# Patient Record
Sex: FEMALE | Race: OTHER | HISPANIC OR LATINO
[De-identification: names, ages, dates, MRNs, and addresses within clinical notes are randomized per-mention and may not be internally consistent; named-entity substitution may affect disease eponyms.]

---

## 2018-06-20 ENCOUNTER — RESULT REVIEW (OUTPATIENT)
Age: 83
End: 2018-06-20

## 2018-08-25 ENCOUNTER — TRANSCRIPTION ENCOUNTER (OUTPATIENT)
Age: 83
End: 2018-08-25

## 2018-08-27 ENCOUNTER — APPOINTMENT (OUTPATIENT)
Dept: OPHTHALMOLOGY | Facility: CLINIC | Age: 83
End: 2018-08-27
Payer: MEDICARE

## 2018-08-27 PROCEDURE — 92004 COMPRE OPH EXAM NEW PT 1/>: CPT

## 2018-09-05 ENCOUNTER — APPOINTMENT (OUTPATIENT)
Dept: ORTHOPEDIC SURGERY | Facility: CLINIC | Age: 83
End: 2018-09-05
Payer: MEDICARE

## 2018-09-05 VITALS
HEART RATE: 70 BPM | BODY MASS INDEX: 22.18 KG/M2 | WEIGHT: 110 LBS | DIASTOLIC BLOOD PRESSURE: 59 MMHG | HEIGHT: 59 IN | SYSTOLIC BLOOD PRESSURE: 153 MMHG

## 2018-09-05 PROCEDURE — 99203 OFFICE O/P NEW LOW 30 MIN: CPT

## 2018-09-05 PROCEDURE — 72110 X-RAY EXAM L-2 SPINE 4/>VWS: CPT

## 2018-09-05 PROCEDURE — 73502 X-RAY EXAM HIP UNI 2-3 VIEWS: CPT

## 2018-09-10 ENCOUNTER — FORM ENCOUNTER (OUTPATIENT)
Age: 83
End: 2018-09-10

## 2018-09-11 ENCOUNTER — OUTPATIENT (OUTPATIENT)
Dept: OUTPATIENT SERVICES | Facility: HOSPITAL | Age: 83
LOS: 1 days | End: 2018-09-11
Payer: MEDICARE

## 2018-09-11 ENCOUNTER — APPOINTMENT (OUTPATIENT)
Dept: MRI IMAGING | Facility: CLINIC | Age: 83
End: 2018-09-11
Payer: MEDICARE

## 2018-09-11 DIAGNOSIS — M54.16 RADICULOPATHY, LUMBAR REGION: ICD-10-CM

## 2018-09-11 PROCEDURE — 72148 MRI LUMBAR SPINE W/O DYE: CPT

## 2018-09-11 PROCEDURE — 72148 MRI LUMBAR SPINE W/O DYE: CPT | Mod: 26

## 2018-09-12 ENCOUNTER — APPOINTMENT (OUTPATIENT)
Dept: OPHTHALMOLOGY | Facility: CLINIC | Age: 83
End: 2018-09-12
Payer: MEDICARE

## 2018-09-12 PROCEDURE — 92134 CPTRZ OPH DX IMG PST SGM RTA: CPT

## 2018-09-12 PROCEDURE — 92014 COMPRE OPH EXAM EST PT 1/>: CPT

## 2018-09-12 PROCEDURE — 92136 OPHTHALMIC BIOMETRY: CPT | Mod: RT

## 2018-09-24 ENCOUNTER — APPOINTMENT (OUTPATIENT)
Dept: ORTHOPEDIC SURGERY | Facility: CLINIC | Age: 83
End: 2018-09-24
Payer: MEDICARE

## 2018-09-24 VITALS — DIASTOLIC BLOOD PRESSURE: 71 MMHG | SYSTOLIC BLOOD PRESSURE: 143 MMHG | HEART RATE: 73 BPM

## 2018-09-24 PROCEDURE — 99213 OFFICE O/P EST LOW 20 MIN: CPT

## 2018-10-02 ENCOUNTER — APPOINTMENT (OUTPATIENT)
Dept: INTERNAL MEDICINE | Facility: CLINIC | Age: 83
End: 2018-10-02
Payer: MEDICARE

## 2018-10-02 ENCOUNTER — LABORATORY RESULT (OUTPATIENT)
Age: 83
End: 2018-10-02

## 2018-10-02 VITALS
HEIGHT: 59 IN | DIASTOLIC BLOOD PRESSURE: 70 MMHG | SYSTOLIC BLOOD PRESSURE: 120 MMHG | BODY MASS INDEX: 22.58 KG/M2 | WEIGHT: 112 LBS

## 2018-10-02 DIAGNOSIS — Z87.19 PERSONAL HISTORY OF OTHER DISEASES OF THE DIGESTIVE SYSTEM: ICD-10-CM

## 2018-10-02 DIAGNOSIS — Z86.19 PERSONAL HISTORY OF OTHER INFECTIOUS AND PARASITIC DISEASES: ICD-10-CM

## 2018-10-02 PROCEDURE — G0009: CPT

## 2018-10-02 PROCEDURE — 36415 COLL VENOUS BLD VENIPUNCTURE: CPT

## 2018-10-02 PROCEDURE — 99204 OFFICE O/P NEW MOD 45 MIN: CPT | Mod: 25

## 2018-10-02 PROCEDURE — 90670 PCV13 VACCINE IM: CPT

## 2018-10-02 NOTE — ASSESSMENT
[FreeTextEntry1] : Continue same blood pressure medication.  Follow blood pressure.\par Continue same cholesterol medication.  Fasting lipid.\par 25 OH vit. D\par Esophageal reflux and dyspepsia-endoscopy\par Epigastric and RUQ pain-chest x-ray and abdominal ultrasound (NW)\par prevnar 13

## 2018-10-03 LAB
25(OH)D3 SERPL-MCNC: 38.2 NG/ML
ALBUMIN SERPL ELPH-MCNC: 4.5 G/DL
ALP BLD-CCNC: 67 U/L
ALT SERPL-CCNC: 12 U/L
AMYLASE/CREAT SERPL: 205 U/L
ANION GAP SERPL CALC-SCNC: 12 MMOL/L
AST SERPL-CCNC: 22 U/L
BASOPHILS # BLD AUTO: 0.06 K/UL
BASOPHILS NFR BLD AUTO: 0.9 %
BILIRUB SERPL-MCNC: 0.4 MG/DL
BUN SERPL-MCNC: 22 MG/DL
CALCIUM SERPL-MCNC: 9.8 MG/DL
CHLORIDE SERPL-SCNC: 102 MMOL/L
CHOLEST SERPL-MCNC: 216 MG/DL
CHOLEST/HDLC SERPL: 4.5 RATIO
CK SERPL-CCNC: 157 U/L
CO2 SERPL-SCNC: 25 MMOL/L
CREAT SERPL-MCNC: 1.02 MG/DL
EOSINOPHIL # BLD AUTO: 0.2 K/UL
EOSINOPHIL NFR BLD AUTO: 2.9 %
GLUCOSE SERPL-MCNC: 89 MG/DL
HCT VFR BLD CALC: 42 %
HDLC SERPL-MCNC: 48 MG/DL
HGB BLD-MCNC: 13.6 G/DL
IMM GRANULOCYTES NFR BLD AUTO: 0.1 %
LDLC SERPL CALC-MCNC: 140 MG/DL
LPL SERPL-CCNC: 40 U/L
LYMPHOCYTES # BLD AUTO: 2.23 K/UL
LYMPHOCYTES NFR BLD AUTO: 32.5 %
MAN DIFF?: NORMAL
MCHC RBC-ENTMCNC: 28.9 PG
MCHC RBC-ENTMCNC: 32.4 GM/DL
MCV RBC AUTO: 89.4 FL
MONOCYTES # BLD AUTO: 0.49 K/UL
MONOCYTES NFR BLD AUTO: 7.1 %
NEUTROPHILS # BLD AUTO: 3.88 K/UL
NEUTROPHILS NFR BLD AUTO: 56.5 %
PLATELET # BLD AUTO: 234 K/UL
POTASSIUM SERPL-SCNC: 4.3 MMOL/L
PROT SERPL-MCNC: 7.5 G/DL
RBC # BLD: 4.7 M/UL
RBC # FLD: 13.3 %
SODIUM SERPL-SCNC: 139 MMOL/L
TRIGL SERPL-MCNC: 141 MG/DL
TSH SERPL-ACNC: 2.09 UIU/ML
WBC # FLD AUTO: 6.87 K/UL

## 2018-10-05 ENCOUNTER — APPOINTMENT (OUTPATIENT)
Dept: INTERNAL MEDICINE | Facility: CLINIC | Age: 83
End: 2018-10-05
Payer: MEDICARE

## 2018-10-05 ENCOUNTER — APPOINTMENT (OUTPATIENT)
Dept: GASTROENTEROLOGY | Facility: CLINIC | Age: 83
End: 2018-10-05

## 2018-10-05 VITALS
WEIGHT: 113 LBS | SYSTOLIC BLOOD PRESSURE: 122 MMHG | HEIGHT: 59 IN | TEMPERATURE: 97.9 F | BODY MASS INDEX: 22.78 KG/M2 | DIASTOLIC BLOOD PRESSURE: 64 MMHG

## 2018-10-05 PROCEDURE — 99214 OFFICE O/P EST MOD 30 MIN: CPT | Mod: 25

## 2018-10-05 PROCEDURE — 36415 COLL VENOUS BLD VENIPUNCTURE: CPT

## 2018-10-05 NOTE — ASSESSMENT
[FreeTextEntry1] : Cellulitis of left UE-Augmentin 875 b.i.d. X 7 days. If redness worsens or patient has fever, patient will call me

## 2018-10-05 NOTE — HISTORY OF PRESENT ILLNESS
[FreeTextEntry8] : After getting pneumonia shot on October 2, 2018, patient developed redness, swelling, and pain in proximal left arm. No fever.

## 2018-10-05 NOTE — PHYSICAL EXAM
[No Acute Distress] : no acute distress [Well Nourished] : well nourished [Well Developed] : well developed [Normal Outer Ear/Nose] : the outer ears and nose were normal in appearance [Supple] : supple [No Respiratory Distress] : no respiratory distress  [Normal Affect] : the affect was normal [Normal Mood] : the mood was normal [de-identified] : Erythema and edema in proximal left UE

## 2018-10-08 ENCOUNTER — APPOINTMENT (OUTPATIENT)
Dept: RADIOLOGY | Facility: CLINIC | Age: 83
End: 2018-10-08
Payer: MEDICARE

## 2018-10-08 ENCOUNTER — APPOINTMENT (OUTPATIENT)
Dept: ULTRASOUND IMAGING | Facility: CLINIC | Age: 83
End: 2018-10-08
Payer: MEDICARE

## 2018-10-08 ENCOUNTER — OUTPATIENT (OUTPATIENT)
Dept: OUTPATIENT SERVICES | Facility: HOSPITAL | Age: 83
LOS: 1 days | End: 2018-10-08
Payer: MEDICARE

## 2018-10-08 DIAGNOSIS — R05 COUGH: ICD-10-CM

## 2018-10-08 LAB
BASOPHILS # BLD AUTO: 0.03 K/UL
BASOPHILS NFR BLD AUTO: 0.3 %
EOSINOPHIL # BLD AUTO: 0.17 K/UL
EOSINOPHIL NFR BLD AUTO: 1.8 %
HCT VFR BLD CALC: 41.9 %
HGB BLD-MCNC: 13.7 G/DL
IMM GRANULOCYTES NFR BLD AUTO: 0.4 %
LYMPHOCYTES # BLD AUTO: 1.64 K/UL
LYMPHOCYTES NFR BLD AUTO: 17.3 %
MAN DIFF?: NORMAL
MCHC RBC-ENTMCNC: 29.2 PG
MCHC RBC-ENTMCNC: 32.7 GM/DL
MCV RBC AUTO: 89.3 FL
MONOCYTES # BLD AUTO: 0.64 K/UL
MONOCYTES NFR BLD AUTO: 6.8 %
NEUTROPHILS # BLD AUTO: 6.96 K/UL
NEUTROPHILS NFR BLD AUTO: 73.4 %
PLATELET # BLD AUTO: 227 K/UL
RBC # BLD: 4.69 M/UL
RBC # FLD: 13.5 %
WBC # FLD AUTO: 9.48 K/UL

## 2018-10-08 PROCEDURE — 76700 US EXAM ABDOM COMPLETE: CPT | Mod: 26

## 2018-10-08 PROCEDURE — 76700 US EXAM ABDOM COMPLETE: CPT

## 2018-10-08 PROCEDURE — 71046 X-RAY EXAM CHEST 2 VIEWS: CPT | Mod: 26

## 2018-10-08 PROCEDURE — 71046 X-RAY EXAM CHEST 2 VIEWS: CPT

## 2018-10-10 DIAGNOSIS — Z87.442 PERSONAL HISTORY OF URINARY CALCULI: ICD-10-CM

## 2018-10-12 ENCOUNTER — APPOINTMENT (OUTPATIENT)
Dept: OPHTHALMOLOGY | Facility: CLINIC | Age: 83
End: 2018-10-12
Payer: MEDICARE

## 2018-10-12 PROCEDURE — 92012 INTRM OPH EXAM EST PATIENT: CPT

## 2018-10-12 PROCEDURE — 92136 OPHTHALMIC BIOMETRY: CPT | Mod: LT

## 2018-10-16 ENCOUNTER — APPOINTMENT (OUTPATIENT)
Dept: INTERNAL MEDICINE | Facility: CLINIC | Age: 83
End: 2018-10-16
Payer: MEDICARE

## 2018-10-16 VITALS
SYSTOLIC BLOOD PRESSURE: 130 MMHG | HEART RATE: 68 BPM | HEIGHT: 59 IN | BODY MASS INDEX: 22.78 KG/M2 | WEIGHT: 113 LBS | DIASTOLIC BLOOD PRESSURE: 60 MMHG

## 2018-10-16 DIAGNOSIS — Z71.89 OTHER SPECIFIED COUNSELING: ICD-10-CM

## 2018-10-16 PROCEDURE — 93000 ELECTROCARDIOGRAM COMPLETE: CPT

## 2018-10-16 PROCEDURE — 94010 BREATHING CAPACITY TEST: CPT

## 2018-10-16 PROCEDURE — G0439: CPT

## 2018-10-16 PROCEDURE — 99214 OFFICE O/P EST MOD 30 MIN: CPT | Mod: 25

## 2018-10-16 NOTE — HEALTH RISK ASSESSMENT
[Fair] : ~his/her~ current health as fair  [Very Good] : ~his/her~  mood as very good [] : No [No falls in past year] : Patient reported no falls in the past year [0] : 2) Feeling down, depressed, or hopeless: Not at all (0) [Patient declined mammogram] : Patient declined mammogram [Patient declined PAP Smear] : Patient declined PAP Smear [Patient reported colonoscopy was normal] : Patient reported colonoscopy was normal [Change in mental status noted] : No change in mental status noted [Language] : denies difficulty with language [Behavior] : denies difficulty with behavior [Learning/Retaining New Information] : denies difficulty learning/retaining new information [Handling Complex Tasks] : denies difficulty handling complex tasks [Reasoning] : denies difficulty with reasoning [None] : None [Alone] : lives alone [Retired] : retired [] :  [Feels Safe at Home] : Feels safe at home [Fully functional (bathing, dressing, toileting, transferring, walking, feeding)] : Fully functional (bathing, dressing, toileting, transferring, walking, feeding) [Fully functional (using the telephone, shopping, preparing meals, housekeeping, doing laundry, using] : Fully functional and needs no help or supervision to perform IADLs (using the telephone, shopping, preparing meals, housekeeping, doing laundry, using transportation, managing medications and managing finances) [Smoke Detector] : smoke detector [Seat Belt] :  uses seat belt [Designated Healthcare Proxy] : Designated healthcare proxy [Name: ___] : Health Care Proxy's Name: [unfilled]  [Relationship: ___] : Relationship: [unfilled]

## 2018-10-16 NOTE — PHYSICAL EXAM
[Well-Appearing] : well-appearing [No Acute Distress] : no acute distress [Well Nourished] : well nourished [Well Developed] : well developed [Normal Outer Ear/Nose] : the outer ears and nose were normal in appearance [Supple] : supple [No Respiratory Distress] : no respiratory distress  [Clear to Auscultation] : lungs were clear to auscultation bilaterally [Normal Rate] : normal rate  [Regular Rhythm] : with a regular rhythm [No Edema] : there was no peripheral edema [Soft] : abdomen soft [Non Tender] : non-tender [Normal Posterior Cervical Nodes] : no posterior cervical lymphadenopathy [Normal Anterior Cervical Nodes] : no anterior cervical lymphadenopathy [No CVA Tenderness] : no CVA  tenderness [No Spinal Tenderness] : no spinal tenderness [Cyanosis] : no cyanosis [Abnormal Temperature] : normal temperature [Normal Gait] : normal gait [Normal Affect] : the affect was normal [Normal Mood] : the mood was normal

## 2018-10-16 NOTE — HISTORY OF PRESENT ILLNESS
[FreeTextEntry1] : Hypertension and hypercholesterol [de-identified] : Chronic fatigue\par 6 months of intermittent dyspepsia and esophageal reflux, especially after eating spicy or fatty food. No abdominal pain. Occasional dry cough. No melena.

## 2018-10-16 NOTE — HISTORY OF PRESENT ILLNESS
[FreeTextEntry1] : Hypertension and hypercholesterol [de-identified] : Chronic fatigue\par 6 months of intermittent dyspepsia and esophageal reflux, especially after eating spicy or fatty food. No abdominal pain. Occasional dry cough. No melena.

## 2018-10-16 NOTE — ASSESSMENT
[FreeTextEntry1] : Esophageal reflux and dyspepsia-endoscopy\par Continue same blood pressure medication.  Follow blood pressure.\par Continue same cholesterol medication.  Follow lipid.  Want cholesterol< 200\par Patient refuses all vaccinations, including Pneumovax, influenza, shingles, and Tdap.\par Patient refuses GYN evaluation and screening mammogram

## 2018-10-16 NOTE — DATA REVIEWED
[FreeTextEntry1] : EKG and PFT-results reviewed with patient.\par All blood tests were reviewed with patient.\par

## 2018-11-02 ENCOUNTER — OUTPATIENT (OUTPATIENT)
Dept: OUTPATIENT SERVICES | Facility: HOSPITAL | Age: 83
LOS: 1 days | End: 2018-11-02

## 2018-11-02 VITALS
SYSTOLIC BLOOD PRESSURE: 140 MMHG | HEIGHT: 59 IN | OXYGEN SATURATION: 97 % | TEMPERATURE: 98 F | WEIGHT: 111.99 LBS | DIASTOLIC BLOOD PRESSURE: 70 MMHG | HEART RATE: 68 BPM | RESPIRATION RATE: 16 BRPM

## 2018-11-02 DIAGNOSIS — Z98.890 OTHER SPECIFIED POSTPROCEDURAL STATES: Chronic | ICD-10-CM

## 2018-11-02 DIAGNOSIS — Z98.41 CATARACT EXTRACTION STATUS, RIGHT EYE: Chronic | ICD-10-CM

## 2018-11-02 DIAGNOSIS — S52.513A DISPLACED FRACTURE OF UNSPECIFIED RADIAL STYLOID PROCESS, INITIAL ENCOUNTER FOR CLOSED FRACTURE: ICD-10-CM

## 2018-11-02 DIAGNOSIS — Z90.710 ACQUIRED ABSENCE OF BOTH CERVIX AND UTERUS: Chronic | ICD-10-CM

## 2018-11-02 DIAGNOSIS — I10 ESSENTIAL (PRIMARY) HYPERTENSION: ICD-10-CM

## 2018-11-02 NOTE — H&P PST ADULT - PROBLEM SELECTOR PLAN 1
Pt is scheduled for operative fixation right distal radius fracture with exparel for 11/5/18. Pre-op instructions provided. Pepcid provided for GI prophylaxis. Chlorhexidine soap provided for skin prep. Pt stated understanding. Case discussed with Dr Blackburn anesthesiologist.

## 2018-11-02 NOTE — H&P PST ADULT - MUSCULOSKELETAL
details… detailed exam no joint warmth/no calf tenderness/normal strength/decreased ROM due to pain/no joint erythema

## 2018-11-02 NOTE — H&P PST ADULT - NEGATIVE ENMT SYMPTOMS
no tinnitus/no throat pain/no nose bleeds/no hearing difficulty/no vertigo/no sinus symptoms/no ear pain/no nasal congestion/no dysphagia

## 2018-11-02 NOTE — H&P PST ADULT - PMH
Displaced fracture of unspecified radial styloid process, initial encounter for closed fracture    GERD (gastroesophageal reflux disease)    Hepatitis C  treated 2 years ago  Hyperlipidemia    Hypertension    Lumbar radiculopathy    Macular degeneration    Osteoarthritis

## 2018-11-02 NOTE — H&P PST ADULT - RS GEN PE MLT RESP DETAILS PC
clear to auscultation bilaterally/respirations non-labored/good air movement/breath sounds equal/airway patent

## 2018-11-02 NOTE — H&P PST ADULT - NEGATIVE NEUROLOGICAL SYMPTOMS
no paresthesias/no syncope/no difficulty walking/no weakness/no generalized seizures/no tremors/no transient paralysis
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
## 2018-11-02 NOTE — H&P PST ADULT - MUSCULOSKELETAL COMMENTS
Right distal radius fracture Right forearm/wrist in splint, cap refill <3 sec, able to wiggle digits, positive sensation
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
## 2018-11-04 ENCOUNTER — TRANSCRIPTION ENCOUNTER (OUTPATIENT)
Age: 83
End: 2018-11-04

## 2018-11-05 ENCOUNTER — OUTPATIENT (OUTPATIENT)
Dept: OUTPATIENT SERVICES | Facility: HOSPITAL | Age: 83
LOS: 1 days | Discharge: HOME | End: 2018-11-05

## 2018-11-05 VITALS
TEMPERATURE: 98 F | RESPIRATION RATE: 16 BRPM | HEIGHT: 59 IN | OXYGEN SATURATION: 97 % | WEIGHT: 111.99 LBS | SYSTOLIC BLOOD PRESSURE: 157 MMHG | HEART RATE: 67 BPM | DIASTOLIC BLOOD PRESSURE: 53 MMHG

## 2018-11-05 VITALS
TEMPERATURE: 98 F | RESPIRATION RATE: 20 BRPM | DIASTOLIC BLOOD PRESSURE: 49 MMHG | SYSTOLIC BLOOD PRESSURE: 145 MMHG | HEART RATE: 85 BPM | OXYGEN SATURATION: 96 %

## 2018-11-05 DIAGNOSIS — Z98.890 OTHER SPECIFIED POSTPROCEDURAL STATES: Chronic | ICD-10-CM

## 2018-11-05 DIAGNOSIS — Z98.41 CATARACT EXTRACTION STATUS, RIGHT EYE: Chronic | ICD-10-CM

## 2018-11-05 DIAGNOSIS — Z90.710 ACQUIRED ABSENCE OF BOTH CERVIX AND UTERUS: Chronic | ICD-10-CM

## 2018-11-05 DIAGNOSIS — S52.513A DISPLACED FRACTURE OF UNSPECIFIED RADIAL STYLOID PROCESS, INITIAL ENCOUNTER FOR CLOSED FRACTURE: ICD-10-CM

## 2018-11-05 RX ORDER — ONDANSETRON 8 MG/1
1 TABLET, FILM COATED ORAL
Qty: 12 | Refills: 0
Start: 2018-11-05 | End: 2018-11-08

## 2018-11-05 NOTE — ASU DISCHARGE PLAN (ADULT/PEDIATRIC). - ACTIVITY LEVEL
no sports/gym/no weight bearing/no exercise no heavy lifting/no sports/gym/until cleared by Dr. Orozco. Use sling for support, Ice arm/no exercise/no weight bearing

## 2018-11-05 NOTE — ASU DISCHARGE PLAN (ADULT/PEDIATRIC). - ITEMS TO FOLLOWUP WITH YOUR PHYSICIAN'S
Please follow up with Dr. Orozco within 1-2 weeks. You may call [phone] to schedule an appointment.    Please do not participate in heavy lifting or strenuous exercise. Do not drive while taking pain medication.    Please go to the emergency department if you experience pain not controlled by pain medication, bleeding that will not stop, fevers or chills.

## 2018-11-05 NOTE — BRIEF OPERATIVE NOTE - PROCEDURE
<<-----Click on this checkbox to enter Procedure Open reduction and internal fixation of fracture of distal radius  11/05/2018    Active  RICHARD

## 2018-11-05 NOTE — ASU DISCHARGE PLAN (ADULT/PEDIATRIC). - NOTIFY
Fever greater than 101/Bleeding that does not stop Swelling that continues/Bleeding that does not stop/Persistent Nausea and Vomiting/Fever greater than 101/Numbness, color, or temperature change to extremity/Pain not relieved by Medications

## 2018-11-05 NOTE — ASU DISCHARGE PLAN (ADULT/PEDIATRIC). - FOLLOWUP APPOINTMENT CLINIC/PHYSICIAN
Please follow up with Dr. Orozco within 1-2 weeks. You may call [phone] to schedule an appointment.

## 2018-11-05 NOTE — ASU DISCHARGE PLAN (ADULT/PEDIATRIC). - INSTRUCTIONS
Please do not participate in heavy lifting or strenuous exercise. Do not drive while taking pain medication.    Please go to the emergency department if you experience pain not controlled by pain medication, bleeding that will not stop, fevers or chills. Progress to regular diet as tolerated.  Keep well hydrated.

## 2018-11-05 NOTE — ASU DISCHARGE PLAN (ADULT/PEDIATRIC). - ASU FOLLOWUP
911 or go to the nearest Emergency Room [facility] ([facility]):

## 2018-11-05 NOTE — ASU DISCHARGE PLAN (ADULT/PEDIATRIC). - MEDICATION SUMMARY - MEDICATIONS TO TAKE
I will START or STAY ON the medications listed below when I get home from the hospital:    oxyCODONE-acetaminophen 5 mg-325 mg oral tablet  -- 1 tab(s) by mouth every 4 to 6 hours MDD:10 per day  -- Caution federal law prohibits the transfer of this drug to any person other  than the person for whom it was prescribed.  May cause drowsiness.  Alcohol may intensify this effect.  Use care when operating dangerous machinery.  This prescription cannot be refilled.  This product contains acetaminophen.  Do not use  with any other product containing acetaminophen to prevent possible liver damage.  Using more of this medication than prescribed may cause serious breathing problems.    -- Indication: For Pain    losartan 25 mg oral tablet  -- 1 tab(s) by mouth once a day  -- Indication: For HTN    ondansetron 4 mg oral tablet  -- 1 tab(s) by mouth every 8 hours   -- Indication: For Nausea    pravastatin 20 mg oral tablet  -- 1 tab(s) by mouth once a day  -- Indication: For HLD    Linzess 145 mcg oral capsule  -- 1 cap(s) by mouth once a day, As Needed  -- Indication: For IBD

## 2018-11-09 ENCOUNTER — APPOINTMENT (OUTPATIENT)
Dept: UROLOGY | Facility: CLINIC | Age: 83
End: 2018-11-09

## 2018-11-21 ENCOUNTER — APPOINTMENT (OUTPATIENT)
Dept: SPINE | Facility: CLINIC | Age: 83
End: 2018-11-21
Payer: MEDICARE

## 2018-11-21 VITALS
WEIGHT: 113 LBS | DIASTOLIC BLOOD PRESSURE: 67 MMHG | SYSTOLIC BLOOD PRESSURE: 149 MMHG | HEART RATE: 77 BPM | BODY MASS INDEX: 22.78 KG/M2 | HEIGHT: 59 IN

## 2018-11-21 DIAGNOSIS — Z86.79 PERSONAL HISTORY OF OTHER DISEASES OF THE CIRCULATORY SYSTEM: ICD-10-CM

## 2018-11-21 PROBLEM — I10 ESSENTIAL (PRIMARY) HYPERTENSION: Chronic | Status: ACTIVE | Noted: 2018-11-02

## 2018-11-21 PROBLEM — K21.9 GASTRO-ESOPHAGEAL REFLUX DISEASE WITHOUT ESOPHAGITIS: Chronic | Status: ACTIVE | Noted: 2018-11-02

## 2018-11-21 PROBLEM — E78.5 HYPERLIPIDEMIA, UNSPECIFIED: Chronic | Status: ACTIVE | Noted: 2018-11-02

## 2018-11-21 PROBLEM — H35.30 UNSPECIFIED MACULAR DEGENERATION: Chronic | Status: ACTIVE | Noted: 2018-11-02

## 2018-11-21 PROBLEM — B19.20 UNSPECIFIED VIRAL HEPATITIS C WITHOUT HEPATIC COMA: Chronic | Status: ACTIVE | Noted: 2018-11-02

## 2018-11-21 PROBLEM — M54.16 RADICULOPATHY, LUMBAR REGION: Chronic | Status: ACTIVE | Noted: 2018-11-02

## 2018-11-21 PROBLEM — M19.90 UNSPECIFIED OSTEOARTHRITIS, UNSPECIFIED SITE: Chronic | Status: ACTIVE | Noted: 2018-11-02

## 2018-11-21 PROBLEM — S52.513A: Chronic | Status: ACTIVE | Noted: 2018-11-02

## 2018-11-21 PROCEDURE — 99204 OFFICE O/P NEW MOD 45 MIN: CPT

## 2018-11-21 RX ORDER — CHROMIUM 200 MCG
1000 TABLET ORAL DAILY
Qty: 90 | Refills: 3 | Status: COMPLETED | COMMUNITY
Start: 2018-10-02 | End: 2018-11-21

## 2018-11-21 RX ORDER — CARVEDILOL 3.12 MG/1
3.12 TABLET, FILM COATED ORAL
Refills: 7 | Status: COMPLETED | COMMUNITY
End: 2018-11-21

## 2018-11-21 RX ORDER — LOSARTAN POTASSIUM 25 MG/1
25 TABLET, FILM COATED ORAL DAILY
Qty: 90 | Refills: 3 | Status: COMPLETED | COMMUNITY
End: 2018-11-21

## 2018-11-30 NOTE — ASU PREOPERATIVE ASSESSMENT, ADULT (IPARK ONLY) - NOTHING BY MOUTH SINCE
How Severe Is Your Acne?: moderate Is This A New Presentation, Or A Follow-Up?: Acne 04-Nov-2018 19:00

## 2018-12-07 ENCOUNTER — APPOINTMENT (OUTPATIENT)
Dept: OPHTHALMOLOGY | Facility: CLINIC | Age: 83
End: 2018-12-07

## 2018-12-11 ENCOUNTER — APPOINTMENT (OUTPATIENT)
Dept: OPHTHALMOLOGY | Facility: CLINIC | Age: 83
End: 2018-12-11
Payer: MEDICARE

## 2018-12-11 PROCEDURE — 99213 OFFICE O/P EST LOW 20 MIN: CPT

## 2018-12-14 ENCOUNTER — APPOINTMENT (OUTPATIENT)
Dept: OPHTHALMOLOGY | Facility: CLINIC | Age: 83
End: 2018-12-14
Payer: MEDICARE

## 2018-12-14 PROCEDURE — 92012 INTRM OPH EXAM EST PATIENT: CPT

## 2018-12-19 ENCOUNTER — APPOINTMENT (OUTPATIENT)
Dept: OPHTHALMOLOGY | Facility: CLINIC | Age: 83
End: 2018-12-19
Payer: MEDICARE

## 2018-12-19 PROCEDURE — 92012 INTRM OPH EXAM EST PATIENT: CPT

## 2018-12-21 ENCOUNTER — RX RENEWAL (OUTPATIENT)
Age: 83
End: 2018-12-21

## 2018-12-28 ENCOUNTER — APPOINTMENT (OUTPATIENT)
Dept: INTERNAL MEDICINE | Facility: CLINIC | Age: 83
End: 2018-12-28
Payer: MEDICARE

## 2018-12-28 VITALS
BODY MASS INDEX: 22.98 KG/M2 | TEMPERATURE: 98.7 F | SYSTOLIC BLOOD PRESSURE: 110 MMHG | WEIGHT: 114 LBS | HEIGHT: 59 IN | DIASTOLIC BLOOD PRESSURE: 68 MMHG

## 2018-12-28 PROCEDURE — 99214 OFFICE O/P EST MOD 30 MIN: CPT

## 2018-12-28 NOTE — PHYSICAL EXAM
[No Acute Distress] : no acute distress [Well Nourished] : well nourished [Well Developed] : well developed [Normal Outer Ear/Nose] : the outer ears and nose were normal in appearance [Supple] : supple [No Respiratory Distress] : no respiratory distress  [Clear to Auscultation] : lungs were clear to auscultation bilaterally [Normal Rate] : normal rate  [Regular Rhythm] : with a regular rhythm [No Edema] : there was no peripheral edema [Soft] : abdomen soft [No CVA Tenderness] : no CVA  tenderness [No Spinal Tenderness] : no spinal tenderness [Cyanosis] : no cyanosis [Abnormal Temperature] : normal temperature [Normal Gait] : normal gait [Normal Affect] : the affect was normal [Normal Mood] : the mood was normal

## 2018-12-28 NOTE — HISTORY OF PRESENT ILLNESS
[FreeTextEntry8] : One week of itchy red facial rash. No SOB. No wheezing.\par 10 years of intermittent itchy rash throughout body-seen multiple dermatologists in the past with no improvement.

## 2019-01-03 ENCOUNTER — APPOINTMENT (OUTPATIENT)
Dept: OPHTHALMOLOGY | Facility: HOSPITAL | Age: 84
End: 2019-01-03

## 2019-01-04 ENCOUNTER — APPOINTMENT (OUTPATIENT)
Dept: OPHTHALMOLOGY | Facility: CLINIC | Age: 84
End: 2019-01-04

## 2019-01-09 ENCOUNTER — APPOINTMENT (OUTPATIENT)
Dept: SPINE | Facility: CLINIC | Age: 84
End: 2019-01-09

## 2019-01-11 ENCOUNTER — APPOINTMENT (OUTPATIENT)
Dept: OPHTHALMOLOGY | Facility: CLINIC | Age: 84
End: 2019-01-11

## 2019-01-17 ENCOUNTER — APPOINTMENT (OUTPATIENT)
Dept: INTERNAL MEDICINE | Facility: CLINIC | Age: 84
End: 2019-01-17
Payer: MEDICARE

## 2019-01-17 VITALS
TEMPERATURE: 98.6 F | BODY MASS INDEX: 22.98 KG/M2 | OXYGEN SATURATION: 96 % | HEART RATE: 70 BPM | HEIGHT: 59 IN | WEIGHT: 114 LBS | DIASTOLIC BLOOD PRESSURE: 60 MMHG | RESPIRATION RATE: 16 BRPM | SYSTOLIC BLOOD PRESSURE: 118 MMHG

## 2019-01-17 PROCEDURE — 94060 EVALUATION OF WHEEZING: CPT

## 2019-01-17 PROCEDURE — 99204 OFFICE O/P NEW MOD 45 MIN: CPT | Mod: 25

## 2019-01-17 NOTE — REASON FOR VISIT
[Initial Evaluation] : an initial evaluation [Cough] : cough [Wheezing] : wheezing [FreeTextEntry2] : Symptoms have been on and off throughout her lifetime, worse in recent years

## 2019-01-17 NOTE — ASSESSMENT
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 Long-term exposures to irritants other likely etiology. There may be an allergic component. Reflux may also be a contributor. She has sneezing in the morning with rhinitis. Vasomotor rhinitis may also be contributing. She also had exposure to secondhand smoke for many years.\par \par Flovent 220 will be started. The importance of using it religiously is stressed. She will be reevaluated in 6-8 weeks. The addition of montelukast or an anti-reflux regimen can be considered.

## 2019-01-17 NOTE — HISTORY OF PRESENT ILLNESS
[Stable] : are stable [None] : ~He/She~ has no significant interval events [Difficulty Breathing During Exertion] : stable dyspnea on exertion [Feelings Of Weakness On Exertion] : stable exercise intolerance [Cough] : stable coughing [Wheezing] : stable wheezing [Regional Soft Tissue Swelling Both Lower Extremities] : denies lower extremity edema [Chest Pain Or Discomfort] : denies chest pain [Fever] : denies fever [FreeTextEntry1] : She has had intervals of cough, usually dry. Catalysts include upper respiratory infections which would result in persisting cough and wheezing. She has used albuterol p.r.n. which has been helpful.\par \par She grew up in [place], lived briefly in [facility] and has been [place] ever since. 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 Her  was a heavy cigarette smoker. A chest x-ray in 10/18 revealed clear lung fields.

## 2019-01-17 NOTE — DISCUSSION/SUMMARY
[FreeTextEntry1] : Spirometry is normal but technically not an optimal study. Response to bronchodilator is equivocal.

## 2019-01-17 NOTE — REVIEW OF SYSTEMS
[Nasal Congestion] : nasal congestion [Postnasal Drip] : postnasal drip [As Noted in HPI] : as noted in HPI [Heartburn] : heartburn [Reflux] : reflux [Negative] : Gastrointestinal [Dry Eyes] : no dryness of the eyes [Eye Irritation] : no ~T irritation of the eyes [Ear Disturbance] : no ear disturbance [Epistaxis] : no nosebleeds [Sinus Problems] : no sinus problems [Sore Throat] : no sore throat [Dry Mouth] : no dry mouth [Mouth Ulcers] : no mouth ulcers [Indigestion] : no indigestion [Dysphagia] : no dysphagia [Nausea] : no nausea [Vomiting] : no vomiting [Constipation] : no constipation [Diarrhea] : no diarrhea [Food Intolerance] : no ~T food intolerance [Abdominal Pain] : no abdominal pain [Bleeding] : no bleeding [Hepatic Disease] : no hepatic disease

## 2019-01-28 ENCOUNTER — APPOINTMENT (OUTPATIENT)
Dept: INTERNAL MEDICINE | Facility: CLINIC | Age: 84
End: 2019-01-28

## 2019-03-14 ENCOUNTER — APPOINTMENT (OUTPATIENT)
Dept: INTERNAL MEDICINE | Facility: CLINIC | Age: 84
End: 2019-03-14
Payer: MEDICARE

## 2019-03-14 VITALS
BODY MASS INDEX: 23.79 KG/M2 | DIASTOLIC BLOOD PRESSURE: 54 MMHG | OXYGEN SATURATION: 97 % | SYSTOLIC BLOOD PRESSURE: 130 MMHG | TEMPERATURE: 97.8 F | HEIGHT: 59 IN | WEIGHT: 118 LBS | RESPIRATION RATE: 16 BRPM | HEART RATE: 68 BPM

## 2019-03-14 PROCEDURE — 99214 OFFICE O/P EST MOD 30 MIN: CPT | Mod: 25

## 2019-03-14 PROCEDURE — 94060 EVALUATION OF WHEEZING: CPT

## 2019-03-14 RX ORDER — FLUTICASONE PROPIONATE 220 UG/1
220 AEROSOL, METERED RESPIRATORY (INHALATION) TWICE DAILY
Qty: 1 | Refills: 3 | Status: DISCONTINUED | COMMUNITY
Start: 2019-01-17 | End: 2019-03-14

## 2019-03-14 NOTE — ASSESSMENT
[FreeTextEntry1] : The patient's exercise tolerance is severely limited due to wheezing. Spirometry is borderline low obstruction, with no significant response to bronchodilator. Her compliance to this point has been poor. A combination steroid/albuterol as prescribed. The importance of regular and Rastafari b.i.d. usage is stressed. She will be reevaluated in 4 weeks.

## 2019-03-14 NOTE — REASON FOR VISIT
[Asthma] : asthma [Cough] : cough [Shortness of Breath] : shortness of Breath [Follow-Up] : a follow-up visit

## 2019-03-14 NOTE — REVIEW OF SYSTEMS
[Nasal Congestion] : nasal congestion [Postnasal Drip] : postnasal drip [Hypertension] : ~T hypertension [As Noted in HPI] : as noted in HPI [Negative] : Gastrointestinal

## 2019-03-14 NOTE — HISTORY OF PRESENT ILLNESS
[Stable] : are stable [Difficulty Breathing During Exertion] : stable dyspnea on exertion [Feelings Of Weakness On Exertion] : stable exercise intolerance [Cough] : stable coughing [Wheezing] : stable wheezing [Regional Soft Tissue Swelling Both Lower Extremities] : denies lower extremity edema [Chest Pain Or Discomfort] : denies chest pain [FreeTextEntry1] : The patient's status is essentially unchanged. She still has ongoing coughing. Her exertional tolerance is very limited by the onset of wheezing. She wheezes a lot at night also.\par \par Flovent was prescribed but used by the patient between 2 and 3 times per week.

## 2019-03-14 NOTE — PHYSICAL EXAM
[General Appearance - Well Developed] : well developed [General Appearance - Well Nourished] : well nourished [Normal Conjunctiva] : the conjunctiva exhibited no abnormalities [Eyelids - No Xanthelasma] : the eyelids demonstrated no xanthelasmas [Normal Oropharynx] : normal oropharynx [Neck Appearance] : the appearance of the neck was normal [Neck Cervical Mass (___cm)] : no neck mass was observed [Jugular Venous Distention Increased] : there was no jugular-venous distention [Thyroid Diffuse Enlargement] : the thyroid was not enlarged [Thyroid Nodule] : there were no palpable thyroid nodules [Heart Rate And Rhythm] : heart rate and rhythm were normal [Heart Sounds] : normal S1 and S2 [Murmurs] : no murmurs present [Respiration, Rhythm And Depth] : normal respiratory rhythm and effort [Exaggerated Use Of Accessory Muscles For Inspiration] : no accessory muscle use [Auscultation Breath Sounds / Voice Sounds] : lungs were clear to auscultation bilaterally [Abdomen Soft] : soft [Abdomen Tenderness] : non-tender [Abdomen Mass (___ Cm)] : no abdominal mass palpated [Abnormal Walk] : normal gait [Gait - Sufficient For Exercise Testing] : the gait was sufficient for exercise testing [Nail Clubbing] : no clubbing of the fingernails [Cyanosis, Localized] : no localized cyanosis [Petechial Hemorrhages (___cm)] : no petechial hemorrhages [Skin Color & Pigmentation] : normal skin color and pigmentation [] : no rash [No Venous Stasis] : no venous stasis [Skin Lesions] : no skin lesions [No Skin Ulcers] : no skin ulcer [No Xanthoma] : no  xanthoma was observed [Oriented To Time, Place, And Person] : oriented to person, place, and time [Impaired Insight] : insight and judgment were intact [Affect] : the affect was normal [FreeTextEntry1] : Minimal end ion with cough, no rhonchi Or wheezes

## 2019-03-15 ENCOUNTER — RX RENEWAL (OUTPATIENT)
Age: 84
End: 2019-03-15

## 2019-04-16 ENCOUNTER — APPOINTMENT (OUTPATIENT)
Dept: INTERNAL MEDICINE | Facility: CLINIC | Age: 84
End: 2019-04-16
Payer: MEDICARE

## 2019-04-16 VITALS
SYSTOLIC BLOOD PRESSURE: 130 MMHG | HEIGHT: 59 IN | WEIGHT: 116 LBS | DIASTOLIC BLOOD PRESSURE: 60 MMHG | BODY MASS INDEX: 23.39 KG/M2

## 2019-04-16 PROCEDURE — 99214 OFFICE O/P EST MOD 30 MIN: CPT | Mod: 25

## 2019-04-16 PROCEDURE — 36415 COLL VENOUS BLD VENIPUNCTURE: CPT

## 2019-04-16 NOTE — PHYSICAL EXAM
[No Acute Distress] : no acute distress [Well Nourished] : well nourished [Well Developed] : well developed [Well-Appearing] : well-appearing [Supple] : supple [Normal Outer Ear/Nose] : the outer ears and nose were normal in appearance [No Respiratory Distress] : no respiratory distress  [Clear to Auscultation] : lungs were clear to auscultation bilaterally [Normal Rate] : normal rate  [No Edema] : there was no peripheral edema [Regular Rhythm] : with a regular rhythm [Soft] : abdomen soft [Non Tender] : non-tender [Normal Posterior Cervical Nodes] : no posterior cervical lymphadenopathy [Normal Anterior Cervical Nodes] : no anterior cervical lymphadenopathy [No CVA Tenderness] : no CVA  tenderness [No Spinal Tenderness] : no spinal tenderness [Speech Grossly Normal] : speech grossly normal [Normal Affect] : the affect was normal [Normal Mood] : the mood was normal [Cyanosis] : no cyanosis [Abnormal Temperature] : normal temperature

## 2019-04-16 NOTE — ASSESSMENT
[FreeTextEntry1] : Continue same blood pressure medication.  Follow blood pressure.\par Continue same cholesterol medication.  Fasting lipid.\par Asthma-stable.  per pulmonologist\par Urinary frequency- (NW)\par DEXA\par shingrix handout given to patient and all questions about shingrix were answered.\par

## 2019-04-16 NOTE — HISTORY OF PRESENT ILLNESS
[FreeTextEntry1] : Asthma, high glucose, hypertension, and hypercholesterolemia [de-identified] : Urinary frequency. No abdominal pain. No dysuria. No hematuria.\par

## 2019-04-18 LAB
ALBUMIN SERPL ELPH-MCNC: 4.2 G/DL
ALP BLD-CCNC: 74 U/L
ALT SERPL-CCNC: 10 U/L
ANION GAP SERPL CALC-SCNC: 12 MMOL/L
AST SERPL-CCNC: 19 U/L
BILIRUB SERPL-MCNC: 0.3 MG/DL
BUN SERPL-MCNC: 15 MG/DL
CALCIUM SERPL-MCNC: 9.3 MG/DL
CHLORIDE SERPL-SCNC: 105 MMOL/L
CHOLEST SERPL-MCNC: 145 MG/DL
CHOLEST/HDLC SERPL: 3.2 RATIO
CK SERPL-CCNC: 60 U/L
CO2 SERPL-SCNC: 22 MMOL/L
CREAT SERPL-MCNC: 0.9 MG/DL
CRP SERPL-MCNC: 0.2 MG/DL
ESTIMATED AVERAGE GLUCOSE: 120 MG/DL
GLUCOSE SERPL-MCNC: 94 MG/DL
HBA1C MFR BLD HPLC: 5.8 %
HDLC SERPL-MCNC: 46 MG/DL
LDLC SERPL CALC-MCNC: 70 MG/DL
POTASSIUM SERPL-SCNC: 4.2 MMOL/L
PROT SERPL-MCNC: 7 G/DL
SODIUM SERPL-SCNC: 139 MMOL/L
TRIGL SERPL-MCNC: 145 MG/DL

## 2019-05-17 ENCOUNTER — APPOINTMENT (OUTPATIENT)
Dept: INTERNAL MEDICINE | Facility: CLINIC | Age: 84
End: 2019-05-17
Payer: MEDICARE

## 2019-05-17 VITALS
OXYGEN SATURATION: 98 % | SYSTOLIC BLOOD PRESSURE: 130 MMHG | HEIGHT: 59 IN | DIASTOLIC BLOOD PRESSURE: 58 MMHG | HEART RATE: 90 BPM | TEMPERATURE: 98.8 F | RESPIRATION RATE: 16 BRPM | WEIGHT: 116 LBS | BODY MASS INDEX: 23.39 KG/M2

## 2019-05-17 PROCEDURE — 99213 OFFICE O/P EST LOW 20 MIN: CPT | Mod: 25

## 2019-05-17 PROCEDURE — 94010 BREATHING CAPACITY TEST: CPT

## 2019-05-17 NOTE — ASSESSMENT
[FreeTextEntry1] : Doing well despite using only a beta agonist. Spirometry is normal. Pulse oximetry is 98%. She will have better exertional tolerance if she could be on a steroid inhaler. She will speak with her insurance carrier as to what options are available. Recheck in 3 months or p.r.n.\par \par Next visit spirometry pre-and postbronchodilator.

## 2019-05-17 NOTE — HISTORY OF PRESENT ILLNESS
[Stable] : are stable [Difficulty Breathing During Exertion] : stable dyspnea on exertion [Feelings Of Weakness On Exertion] : stable exercise intolerance [Cough] : improved coughing [Wheezing] : improved wheezing [Regional Soft Tissue Swelling Both Lower Extremities] : denies lower extremity edema [Chest Pain Or Discomfort] : denies chest pain [Fever] : denies fever [FreeTextEntry9] : Using Pro air  only several times per day. She was unable to obtain Dulera\par at the pharmacy.== Due to insurance issues apparently.

## 2019-05-17 NOTE — PHYSICAL EXAM
[General Appearance - Well Developed] : well developed [General Appearance - Well Nourished] : well nourished [Normal Conjunctiva] : the conjunctiva exhibited no abnormalities [Eyelids - No Xanthelasma] : the eyelids demonstrated no xanthelasmas [Normal Oropharynx] : normal oropharynx [Neck Cervical Mass (___cm)] : no neck mass was observed [Neck Appearance] : the appearance of the neck was normal [Jugular Venous Distention Increased] : there was no jugular-venous distention [Thyroid Diffuse Enlargement] : the thyroid was not enlarged [Thyroid Nodule] : there were no palpable thyroid nodules [Heart Rate And Rhythm] : heart rate and rhythm were normal [Heart Sounds] : normal S1 and S2 [Murmurs] : no murmurs present [Respiration, Rhythm And Depth] : normal respiratory rhythm and effort [Exaggerated Use Of Accessory Muscles For Inspiration] : no accessory muscle use [Auscultation Breath Sounds / Voice Sounds] : lungs were clear to auscultation bilaterally [Abdomen Soft] : soft [Abdomen Tenderness] : non-tender [Abdomen Mass (___ Cm)] : no abdominal mass palpated [Abnormal Walk] : normal gait [Gait - Sufficient For Exercise Testing] : the gait was sufficient for exercise testing [Nail Clubbing] : no clubbing of the fingernails [Cyanosis, Localized] : no localized cyanosis [Petechial Hemorrhages (___cm)] : no petechial hemorrhages [Skin Color & Pigmentation] : normal skin color and pigmentation [] : no rash [No Venous Stasis] : no venous stasis [Skin Lesions] : no skin lesions [No Skin Ulcers] : no skin ulcer [No Xanthoma] : no  xanthoma was observed [Oriented To Time, Place, And Person] : oriented to person, place, and time [Impaired Insight] : insight and judgment were intact [Affect] : the affect was normal [FreeTextEntry1] : Minimal end ion with cough, no rhonchi Or wheezes

## 2019-05-23 RX ORDER — MOMETASONE FUROATE AND FORMOTEROL FUMARATE DIHYDRATE 200; 5 UG/1; UG/1
200-5 AEROSOL RESPIRATORY (INHALATION) TWICE DAILY
Qty: 1 | Refills: 6 | Status: DISCONTINUED | COMMUNITY
Start: 2019-03-14 | End: 2019-05-23

## 2019-06-02 ENCOUNTER — FORM ENCOUNTER (OUTPATIENT)
Age: 84
End: 2019-06-02

## 2019-06-03 ENCOUNTER — APPOINTMENT (OUTPATIENT)
Dept: RADIOLOGY | Facility: CLINIC | Age: 84
End: 2019-06-03
Payer: MEDICARE

## 2019-06-03 ENCOUNTER — APPOINTMENT (OUTPATIENT)
Dept: INTERNAL MEDICINE | Facility: CLINIC | Age: 84
End: 2019-06-03
Payer: MEDICARE

## 2019-06-03 ENCOUNTER — OUTPATIENT (OUTPATIENT)
Dept: OUTPATIENT SERVICES | Facility: HOSPITAL | Age: 84
LOS: 1 days | End: 2019-06-03
Payer: MEDICARE

## 2019-06-03 VITALS
TEMPERATURE: 98.8 F | SYSTOLIC BLOOD PRESSURE: 140 MMHG | DIASTOLIC BLOOD PRESSURE: 70 MMHG | OXYGEN SATURATION: 97 % | HEIGHT: 59 IN | WEIGHT: 113 LBS | BODY MASS INDEX: 22.78 KG/M2 | HEART RATE: 80 BPM

## 2019-06-03 DIAGNOSIS — Z98.890 OTHER SPECIFIED POSTPROCEDURAL STATES: Chronic | ICD-10-CM

## 2019-06-03 DIAGNOSIS — Z98.41 CATARACT EXTRACTION STATUS, RIGHT EYE: Chronic | ICD-10-CM

## 2019-06-03 DIAGNOSIS — R05 COUGH: ICD-10-CM

## 2019-06-03 DIAGNOSIS — Z90.710 ACQUIRED ABSENCE OF BOTH CERVIX AND UTERUS: Chronic | ICD-10-CM

## 2019-06-03 PROCEDURE — 71045 X-RAY EXAM CHEST 1 VIEW: CPT | Mod: 26

## 2019-06-03 PROCEDURE — 71045 X-RAY EXAM CHEST 1 VIEW: CPT

## 2019-06-03 PROCEDURE — 99213 OFFICE O/P EST LOW 20 MIN: CPT | Mod: 25

## 2019-06-03 PROCEDURE — 94010 BREATHING CAPACITY TEST: CPT

## 2019-06-07 ENCOUNTER — OTHER (OUTPATIENT)
Age: 84
End: 2019-06-07

## 2019-07-10 ENCOUNTER — APPOINTMENT (OUTPATIENT)
Dept: INTERNAL MEDICINE | Facility: CLINIC | Age: 84
End: 2019-07-10
Payer: MEDICARE

## 2019-07-10 VITALS
DIASTOLIC BLOOD PRESSURE: 60 MMHG | WEIGHT: 115 LBS | TEMPERATURE: 98.9 F | SYSTOLIC BLOOD PRESSURE: 160 MMHG | BODY MASS INDEX: 23.23 KG/M2

## 2019-07-10 PROCEDURE — 99214 OFFICE O/P EST MOD 30 MIN: CPT

## 2019-07-10 NOTE — HISTORY OF PRESENT ILLNESS
[FreeTextEntry1] : HA and CP [de-identified] : seen in ER 1 week ago with HA, dizziness, CP, and fatigue.  Patient feels better but still has generalized weakness. No recurrence of chest pain. No SOB. Constant dull diffuse headache with occasional dizziness. No syncope.

## 2019-07-10 NOTE — ASSESSMENT
[FreeTextEntry1] : HA-refer to neuro ([facility])\par CP-follow up with card (Rashawn Gastelum)\par Continue same blood pressure medication.  Follow blood pressure.\par Continue same cholesterol medication.  Fasting lipid.\par

## 2019-07-10 NOTE — PHYSICAL EXAM
[Well Developed] : well developed [Normal Outer Ear/Nose] : the outer ears and nose were normal in appearance [No Acute Distress] : no acute distress [No Respiratory Distress] : no respiratory distress  [Supple] : supple [No Accessory Muscle Use] : no accessory muscle use [Clear to Auscultation] : lungs were clear to auscultation bilaterally [Soft] : abdomen soft [Regular Rhythm] : with a regular rhythm [Normal Rate] : normal rate  [Normal Anterior Cervical Nodes] : no anterior cervical lymphadenopathy [Normal Posterior Cervical Nodes] : no posterior cervical lymphadenopathy [No Spinal Tenderness] : no spinal tenderness [No CVA Tenderness] : no CVA  tenderness [Normal Gait] : normal gait [Coordination Grossly Intact] : coordination grossly intact [Speech Grossly Normal] : speech grossly normal [Normal Mood] : the mood was normal [Abnormal Temperature] : normal temperature [Cyanosis] : no cyanosis

## 2019-07-24 ENCOUNTER — APPOINTMENT (OUTPATIENT)
Dept: GASTROENTEROLOGY | Facility: CLINIC | Age: 84
End: 2019-07-24
Payer: MEDICARE

## 2019-07-24 VITALS
WEIGHT: 115 LBS | RESPIRATION RATE: 16 BRPM | BODY MASS INDEX: 23.18 KG/M2 | DIASTOLIC BLOOD PRESSURE: 62 MMHG | OXYGEN SATURATION: 98 % | HEART RATE: 65 BPM | SYSTOLIC BLOOD PRESSURE: 126 MMHG | TEMPERATURE: 97.8 F | HEIGHT: 59 IN

## 2019-07-24 PROCEDURE — 99203 OFFICE O/P NEW LOW 30 MIN: CPT

## 2019-07-24 NOTE — PHYSICAL EXAM
[General Appearance - Alert] : alert [General Appearance - Well Nourished] : well nourished [General Appearance - In No Acute Distress] : in no acute distress [General Appearance - Well Developed] : well developed [Sclera] : the sclera and conjunctiva were normal [Hearing Threshold Finger Rub Not [place]] : hearing was normal [Respiration, Rhythm And Depth] : normal respiratory rhythm and effort [Auscultation Breath Sounds / Voice Sounds] : lungs were clear to auscultation bilaterally [Heart Rate And Rhythm] : heart rate was normal and rhythm regular [Bowel Sounds] : normal bowel sounds [Heart Sounds] : normal S1 and S2 [No CVA Tenderness] : no ~M costovertebral angle tenderness [Abdomen Soft] : soft [FreeTextEntry1] : mild diffuse tenderness [] : no rash [Skin Lesions] : no skin lesions [Oriented To Time, Place, And Person] : oriented to person, place, and time [No Focal Deficits] : no focal deficits

## 2019-07-24 NOTE — REVIEW OF SYSTEMS
[As Noted in HPI] : as noted in HPI [Anxiety] : anxiety [Fever] : no fever [Chills] : no chills [Loss Of Hearing] : no hearing loss [Chest Pain] : no chest pain [Joint Pain] : no joint pain [Arthralgias] : no arthralgias [Skin Lesions] : no skin lesions [Dizziness] : no dizziness [Easy Bleeding] : no tendency for easy bleeding

## 2019-07-24 NOTE — HISTORY OF PRESENT ILLNESS
[FreeTextEntry1] : 85 yo female with c/o diffuse abdominal pain for 20 years,, daily lower quadrant 7/10 pain scale, pain described as sharp,last for minutes,better with bm. unrelated to food. no weight loss. no brbpr, no melena  Patient reports constipation improved with linzess, pain partially improved with bm.\par \par patient had colonoscopy in 2014 per patient was normal

## 2019-07-24 NOTE — ASSESSMENT
[FreeTextEntry1] : chronic abdominal pain and constipation, no alarm symptoms. patient reports colonsocopy in 2014\par \par plan linzess daily\par       abdominal us\par         pending results of abodminal us consider ct colonography vs.colonoscopy

## 2019-07-28 ENCOUNTER — FORM ENCOUNTER (OUTPATIENT)
Age: 84
End: 2019-07-28

## 2019-07-29 ENCOUNTER — APPOINTMENT (OUTPATIENT)
Dept: ULTRASOUND IMAGING | Facility: CLINIC | Age: 84
End: 2019-07-29
Payer: MEDICARE

## 2019-07-29 ENCOUNTER — OUTPATIENT (OUTPATIENT)
Dept: OUTPATIENT SERVICES | Facility: HOSPITAL | Age: 84
LOS: 1 days | End: 2019-07-29
Payer: MEDICARE

## 2019-07-29 DIAGNOSIS — Z98.41 CATARACT EXTRACTION STATUS, RIGHT EYE: Chronic | ICD-10-CM

## 2019-07-29 DIAGNOSIS — Z00.8 ENCOUNTER FOR OTHER GENERAL EXAMINATION: ICD-10-CM

## 2019-07-29 DIAGNOSIS — Z98.890 OTHER SPECIFIED POSTPROCEDURAL STATES: Chronic | ICD-10-CM

## 2019-07-29 DIAGNOSIS — Z90.710 ACQUIRED ABSENCE OF BOTH CERVIX AND UTERUS: Chronic | ICD-10-CM

## 2019-07-29 PROCEDURE — 76700 US EXAM ABDOM COMPLETE: CPT | Mod: 26

## 2019-07-29 PROCEDURE — 76700 US EXAM ABDOM COMPLETE: CPT

## 2019-07-30 ENCOUNTER — APPOINTMENT (OUTPATIENT)
Dept: ULTRASOUND IMAGING | Facility: CLINIC | Age: 84
End: 2019-07-30

## 2019-08-01 NOTE — HISTORY OF PRESENT ILLNESS
[FreeTextEntry8] : 6 months of dyspepsia and esophageal reflux, especially after eating spicy or greasy food. Intermittent sharp epigastric pain over past 6 months. No fever. No vomiting. No melena.\par Chronic fatigue\par Occasional dry cough
Simple: Patient demonstrates quick and easy understanding

## 2019-08-07 ENCOUNTER — OUTPATIENT (OUTPATIENT)
Dept: OUTPATIENT SERVICES | Facility: HOSPITAL | Age: 84
LOS: 1 days | End: 2019-08-07
Payer: MEDICARE

## 2019-08-07 ENCOUNTER — APPOINTMENT (OUTPATIENT)
Dept: CT IMAGING | Facility: CLINIC | Age: 84
End: 2019-08-07
Payer: MEDICARE

## 2019-08-07 DIAGNOSIS — Z90.710 ACQUIRED ABSENCE OF BOTH CERVIX AND UTERUS: Chronic | ICD-10-CM

## 2019-08-07 DIAGNOSIS — Z00.8 ENCOUNTER FOR OTHER GENERAL EXAMINATION: ICD-10-CM

## 2019-08-07 DIAGNOSIS — Z98.890 OTHER SPECIFIED POSTPROCEDURAL STATES: Chronic | ICD-10-CM

## 2019-08-07 DIAGNOSIS — Z98.41 CATARACT EXTRACTION STATUS, RIGHT EYE: Chronic | ICD-10-CM

## 2019-08-07 PROCEDURE — 74176 CT ABD & PELVIS W/O CONTRAST: CPT

## 2019-08-07 PROCEDURE — 74176 CT ABD & PELVIS W/O CONTRAST: CPT | Mod: 26

## 2019-08-14 ENCOUNTER — APPOINTMENT (OUTPATIENT)
Dept: INTERNAL MEDICINE | Facility: CLINIC | Age: 84
End: 2019-08-14
Payer: MEDICARE

## 2019-08-14 VITALS
BODY MASS INDEX: 23.39 KG/M2 | WEIGHT: 116 LBS | HEIGHT: 59 IN | DIASTOLIC BLOOD PRESSURE: 60 MMHG | SYSTOLIC BLOOD PRESSURE: 130 MMHG

## 2019-08-14 PROCEDURE — 99214 OFFICE O/P EST MOD 30 MIN: CPT

## 2019-08-14 PROCEDURE — 77080 DXA BONE DENSITY AXIAL: CPT | Mod: GA

## 2019-08-20 ENCOUNTER — APPOINTMENT (OUTPATIENT)
Dept: INTERNAL MEDICINE | Facility: CLINIC | Age: 84
End: 2019-08-20

## 2019-08-21 ENCOUNTER — APPOINTMENT (OUTPATIENT)
Dept: ALLERGY | Facility: CLINIC | Age: 84
End: 2019-08-21
Payer: MEDICARE

## 2019-08-21 VITALS
BODY MASS INDEX: 23.39 KG/M2 | WEIGHT: 116 LBS | OXYGEN SATURATION: 97 % | SYSTOLIC BLOOD PRESSURE: 120 MMHG | HEIGHT: 59 IN | RESPIRATION RATE: 16 BRPM | HEART RATE: 68 BPM | DIASTOLIC BLOOD PRESSURE: 70 MMHG

## 2019-08-21 PROCEDURE — 99204 OFFICE O/P NEW MOD 45 MIN: CPT

## 2019-08-26 ENCOUNTER — APPOINTMENT (OUTPATIENT)
Dept: ALLERGY | Facility: CLINIC | Age: 84
End: 2019-08-26
Payer: MEDICARE

## 2019-08-26 PROCEDURE — 95044 PATCH/APPLICATION TESTS: CPT

## 2019-08-26 NOTE — PHYSICAL EXAM
[No Acute Distress] : no acute distress [Well Nourished] : well nourished [Well Developed] : well developed [Well-Appearing] : well-appearing [Normal Outer Ear/Nose] : the outer ears and nose were normal in appearance [Supple] : supple [No Respiratory Distress] : no respiratory distress  [No Accessory Muscle Use] : no accessory muscle use [Normal Rate] : normal rate  [Clear to Auscultation] : lungs were clear to auscultation bilaterally [Regular Rhythm] : with a regular rhythm [Soft] : abdomen soft [No CVA Tenderness] : no CVA  tenderness [No Spinal Tenderness] : no spinal tenderness [Normal Affect] : the affect was normal [Normal Gait] : normal gait [Normal Mood] : the mood was normal [Cyanosis] : no cyanosis [Abnormal Temperature] : normal temperature

## 2019-08-26 NOTE — ASSESSMENT
[FreeTextEntry1] : DEXA-osteoporosis. Patient refuses medication for osteoporosis.  Patient wants to see osteoporosis specialist (Radha).  Continue exercise program. Continue calcium 1200 mg daily. Continue vitamin D 800IU daily.\par Continue same blood pressure medication.  Follow blood pressure.\par Continue same cholesterol medication.  Follow lipid.\par follow A1C

## 2019-08-26 NOTE — HISTORY OF PRESENT ILLNESS
[FreeTextEntry1] : Osteoporosis, hypertension, hypercholesterol, and high glucose [de-identified] : no complaints\par

## 2019-08-28 ENCOUNTER — APPOINTMENT (OUTPATIENT)
Dept: ALLERGY | Facility: CLINIC | Age: 84
End: 2019-08-28
Payer: MEDICARE

## 2019-08-28 PROCEDURE — 99212 OFFICE O/P EST SF 10 MIN: CPT

## 2019-08-28 NOTE — REASON FOR VISIT
[Routine Follow-Up] : a routine follow-up visit for [FreeTextEntry3] : patient here for 48 hour patch test reading

## 2019-08-29 ENCOUNTER — APPOINTMENT (OUTPATIENT)
Dept: ALLERGY | Facility: CLINIC | Age: 84
End: 2019-08-29
Payer: MEDICARE

## 2019-08-29 PROCEDURE — 99214 OFFICE O/P EST MOD 30 MIN: CPT

## 2019-08-29 NOTE — ASSESSMENT
[FreeTextEntry1] : Pruritus:\par \par Symptoms much better with Allegra and Xyzal \par I have reviewed avoidance to [place]olis with patient and daughter\par RV 6 weeks.

## 2019-08-29 NOTE — REASON FOR VISIT
[Routine Follow-Up] : a routine follow-up visit for [FreeTextEntry3] : 72 hour patch test interpretation

## 2019-09-10 ENCOUNTER — APPOINTMENT (OUTPATIENT)
Dept: GASTROENTEROLOGY | Facility: CLINIC | Age: 84
End: 2019-09-10
Payer: MEDICARE

## 2019-09-10 VITALS
DIASTOLIC BLOOD PRESSURE: 68 MMHG | WEIGHT: 117 LBS | HEIGHT: 59 IN | SYSTOLIC BLOOD PRESSURE: 142 MMHG | TEMPERATURE: 98 F | BODY MASS INDEX: 23.59 KG/M2

## 2019-09-10 PROCEDURE — 99214 OFFICE O/P EST MOD 30 MIN: CPT

## 2019-09-10 NOTE — ASSESSMENT
[FreeTextEntry1] : Vague diffuse abdominal pain,constipation,dyspepsia, ABdominal us unrevealing,pt noncompliant with medications\par patient refusing egd/colonoscopy to evaluate, pt understands without an evaluation  malignancy maybe  missed.\par \par plan ct colonography\par       recommend pt take linzedss and omeprazole daily

## 2019-09-10 NOTE — PHYSICAL EXAM
[General Appearance - Alert] : alert [General Appearance - In No Acute Distress] : in no acute distress [General Appearance - Well Nourished] : well nourished [Sclera] : the sclera and conjunctiva were normal [Hearing Threshold Finger Rub Not [place]] : hearing was normal [Auscultation Breath Sounds / Voice Sounds] : lungs were clear to auscultation bilaterally [Heart Rate And Rhythm] : heart rate was normal and rhythm regular [Bowel Sounds] : normal bowel sounds [Heart Sounds] : normal S1 and S2 [Abdomen Soft] : soft [FreeTextEntry1] : mild tenderness llq and luq [No CVA Tenderness] : no ~M costovertebral angle tenderness [Nail Clubbing] : no clubbing  or cyanosis of the fingernails [Abnormal Walk] : normal gait [Skin Color & Pigmentation] : normal skin color and pigmentation [Skin Lesions] : no skin lesions [] : no rash [Sensation] : the sensory exam was normal to light touch and pinprick [Motor Exam] : the motor exam was normal [No Focal Deficits] : no focal deficits [Oriented To Time, Place, And Person] : oriented to person, place, and time

## 2019-09-10 NOTE — HISTORY OF PRESENT ILLNESS
[FreeTextEntry1] : 85 yo female with c/o abdominal pain and bloating, c/o urinary incontinence. patient  with c/o chronic constipation. but takes linzess every day. \par \par  Patient reports rectal discomfort, last bm today with linzess. Still with c/o abdominal pain after eating, takes ppi prn. no weight loss. no rectal bleeding.\par \par Patient reports colonoscopy 4 years ago ,negative per patient.

## 2019-09-10 NOTE — REVIEW OF SYSTEMS
[As Noted in HPI] : as noted in HPI [Anxiety] : anxiety [Fever] : no fever [Chills] : no chills [Eyesight Problems] : no eyesight problems [Loss Of Hearing] : no hearing loss [Chest Pain] : no chest pain [Dysuria] : no dysuria [Arthralgias] : no arthralgias [Joint Pain] : no joint pain [Easy Bleeding] : no tendency for easy bleeding [Easy Bruising] : no tendency for easy bruising

## 2019-09-17 ENCOUNTER — RX RENEWAL (OUTPATIENT)
Age: 84
End: 2019-09-17

## 2019-09-18 ENCOUNTER — FORM ENCOUNTER (OUTPATIENT)
Age: 84
End: 2019-09-18

## 2019-09-19 ENCOUNTER — APPOINTMENT (OUTPATIENT)
Dept: CT IMAGING | Facility: CLINIC | Age: 84
End: 2019-09-19

## 2019-09-19 ENCOUNTER — OUTPATIENT (OUTPATIENT)
Dept: OUTPATIENT SERVICES | Facility: HOSPITAL | Age: 84
LOS: 1 days | End: 2019-09-19
Payer: MEDICARE

## 2019-09-19 DIAGNOSIS — Z00.8 ENCOUNTER FOR OTHER GENERAL EXAMINATION: ICD-10-CM

## 2019-09-19 DIAGNOSIS — Z90.710 ACQUIRED ABSENCE OF BOTH CERVIX AND UTERUS: Chronic | ICD-10-CM

## 2019-09-19 DIAGNOSIS — Z98.41 CATARACT EXTRACTION STATUS, RIGHT EYE: Chronic | ICD-10-CM

## 2019-09-19 DIAGNOSIS — Z98.890 OTHER SPECIFIED POSTPROCEDURAL STATES: Chronic | ICD-10-CM

## 2019-09-19 PROCEDURE — 74261 CT COLONOGRAPHY DX: CPT

## 2019-09-19 PROCEDURE — 74261 CT COLONOGRAPHY DX: CPT | Mod: 26

## 2019-09-27 NOTE — HISTORY OF PRESENT ILLNESS
[Eczematous rashes] : eczematous rashes [Venom Reactions] : venom reactions [Food Allergies] : food allergies [de-identified] : Patient with itching ongoing x 15 years initially with no rash and over the past year mild rash.   Patient with hepatitis C - treated and now with no viral load detectable - she is seen by hepatologist every 6 months.   She has been tested by many doctors with negative allergy skin testing.   Patient has been treated with topical creams with no improvement.   She was treated with antihistamines with some improvement in her symptoms. \par \par [place] moved to [place] years - last visit to [place] 2 1/2 years ago. 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

## 2019-09-27 NOTE — PHYSICAL EXAM
[Alert] : alert [Well Nourished] : well nourished [Healthy Appearance] : healthy appearance [No Acute Distress] : no acute distress [Well Developed] : well developed [Normal Pupil & Iris Size/Symmetry] : normal pupil and iris size and symmetry [No Discharge] : no discharge [No Photophobia] : no photophobia [Sclera Not Icteric] : sclera not icteric [Normal Nasal Mucosa] : the nasal mucosa was normal [Normal Lips/Tongue] : the lips and tongue were normal [Normal Tonsils] : normal tonsils [Normal Dentition] : normal dentition [No Oral Lesions or Ulcers] : no oral lesions or ulcers [No Neck Mass] : no neck mass was observed [No LAD] : no lymphadenopathy [No Thyroid Mass] : no thyroid mass [Normal Rate and Effort] : normal respiratory rhythm and effort [Supple] : the neck was supple [No Crackles] : no crackles [Bilateral Audible Breath Sounds] : bilateral audible breath sounds [Normal S1, S2] : normal S1 and S2 [Normal Rate] : heart rate was normal  [No murmur] : no murmur [Regular Rhythm] : with a regular rhythm [Soft] : abdomen soft [Not Tender] : non-tender [Not Distended] : not distended [No HSM] : no hepato-splenomegaly [Normal Cervical Lymph Nodes] : cervical [Normal Axillary Lumph Nodes] : axillary [Normal Inguinal Lymph Nodes] : inguinal [No Skin Lesions] : no skin lesions [No Joint Swelling or Erythema] : no joint swelling or erythema [No clubbing] : no clubbing [No Edema] : no edema [No Cyanosis] : no cyanosis [Normal Mood] : mood was normal [Normal Affect] : affect was normal [Alert, Awake, Oriented as Age-Appropriate] : alert, awake, oriented as age appropriate [de-identified] : papular eruption on neck - erythema and crusting of bottom of feet

## 2019-09-27 NOTE — CONSULT LETTER
[Dear  ___] : Dear  [unfilled], [Thank you for referring [unfilled] for consultation for _____] : Thank you for referring [unfilled] for consultation for [unfilled] [Please see my note below.] : Please see my note below. [Sincerely,] : Sincerely, [FreeTextEntry3] : Mitchell B. Boxer, M.D., FAAAAI\par [facility]\par \par Department of Allergy-Immunology\par [facility] \par [address] [place] [address]\par [place] [address]\par Tel:   [phone]\par Fax:  [phone]\par Email: [email or web address]

## 2019-09-27 NOTE — ASSESSMENT
[FreeTextEntry1] : Pruritus - possible contact dermatitis\par \par Allegra 180 mg QAM \par Xyzal 5 mg QHS \par RV patch testing \par Aquaphor to feet

## 2019-10-10 ENCOUNTER — APPOINTMENT (OUTPATIENT)
Dept: ALLERGY | Facility: CLINIC | Age: 84
End: 2019-10-10

## 2019-11-06 ENCOUNTER — APPOINTMENT (OUTPATIENT)
Dept: INTERNAL MEDICINE | Facility: CLINIC | Age: 84
End: 2019-11-06

## 2019-12-04 ENCOUNTER — APPOINTMENT (OUTPATIENT)
Dept: INTERNAL MEDICINE | Facility: CLINIC | Age: 84
End: 2019-12-04

## 2019-12-11 ENCOUNTER — APPOINTMENT (OUTPATIENT)
Dept: INTERNAL MEDICINE | Facility: CLINIC | Age: 84
End: 2019-12-11

## 2019-12-12 ENCOUNTER — APPOINTMENT (OUTPATIENT)
Dept: INTERNAL MEDICINE | Facility: CLINIC | Age: 84
End: 2019-12-12
Payer: MEDICARE

## 2019-12-12 VITALS
DIASTOLIC BLOOD PRESSURE: 76 MMHG | BODY MASS INDEX: 22.98 KG/M2 | RESPIRATION RATE: 16 BRPM | HEART RATE: 84 BPM | TEMPERATURE: 99.7 F | SYSTOLIC BLOOD PRESSURE: 140 MMHG | OXYGEN SATURATION: 98 % | WEIGHT: 114 LBS | HEIGHT: 59 IN

## 2019-12-12 PROCEDURE — 94010 BREATHING CAPACITY TEST: CPT

## 2019-12-12 PROCEDURE — 99214 OFFICE O/P EST MOD 30 MIN: CPT | Mod: 25

## 2019-12-12 PROCEDURE — 71046 X-RAY EXAM CHEST 2 VIEWS: CPT

## 2019-12-12 NOTE — REASON FOR VISIT
[Acute] : an acute visit [Cough] : cough [Shortness of Breath] : shortness of Breath [Wheezing] : wheezing [FreeTextEntry2] : for 2 weeks

## 2019-12-12 NOTE — HISTORY OF PRESENT ILLNESS
[Worsened] : have worsened [Difficulty Breathing During Exertion] : worsened dyspnea on exertion [Feelings Of Weakness On Exertion] : worsened exercise intolerance [Wheezing] : worsened wheezing [Cough] : worsened coughing [Regional Soft Tissue Swelling Both Lower Extremities] : denies lower extremity edema [Chest Pain Or Discomfort] : denies chest pain [Fever] : denies fever [FreeTextEntry1] : she was doing well until 2 weeks ago when she developed the onset of cough which was wet but nonproductive and associated with wheezing and shortness of breath. She had fatiguebut was not aware of chills or fever. She had been doing well enough that she stopped Symbicort at least several months ago.

## 2019-12-12 NOTE — PROCEDURE
[FreeTextEntry1] : Spirometry is normal.pulse oximetry is 98%.\par \par Chest x-ray reveals slightly increased markings in the right lower lobe but no definable infiltrate. Heart size is normal and the bony thorax is normal for age impression no definitive pulmonary infiltrate/pneumonia

## 2019-12-12 NOTE — PHYSICAL EXAM
[General Appearance - Well Developed] : well developed [Normal Appearance] : normal appearance [Well Groomed] : well groomed [General Appearance - Well Nourished] : well nourished [No Deformities] : no deformities [Normal Oropharynx] : normal oropharynx [General Appearance - In No Acute Distress] : no acute distress [Neck Appearance] : the appearance of the neck was normal [Respiration, Rhythm And Depth] : normal respiratory rhythm and effort [Exaggerated Use Of Accessory Muscles For Inspiration] : no accessory muscle use [Auscultation Breath Sounds / Voice Sounds] : lungs were clear to auscultation bilaterally [Abdomen Soft] : soft [Abdomen Tenderness] : non-tender [Abdomen Mass (___ Cm)] : no abdominal mass palpated [Abnormal Walk] : normal gait [Gait - Sufficient For Exercise Testing] : the gait was sufficient for exercise testing [Nail Clubbing] : no clubbing of the fingernails [Cyanosis, Localized] : no localized cyanosis [Petechial Hemorrhages (___cm)] : no petechial hemorrhages [Skin Color & Pigmentation] : normal skin color and pigmentation [] : no rash [No Venous Stasis] : no venous stasis [Skin Lesions] : no skin lesions [No Skin Ulcers] : no skin ulcer [No Xanthoma] : no  xanthoma was observed [Oriented To Time, Place, And Person] : oriented to person, place, and time [Impaired Insight] : insight and judgment were intact [Affect] : the affect was normal [FreeTextEntry1] : end expiratory congestion with cough, occasional wheeze.

## 2019-12-12 NOTE — ASSESSMENT
[FreeTextEntry1] : the patient appears to have asthmatic bronchitis with an exacerbation due to infection. Note that she has never smoked cigarettes but had considerable secondhand exposure in her early life from her father..\par She has not been using Symbicort for several months because she felt well.\par \par Hypertension is not presently problematic.\par \par She will start a course of Augmentin and prednisone. She is also urged to restart Symbicort for at least the next 3 weeks and used regularly. She is warned. She will call emergently if she becomes more short of breath or fever persists after 72 hours.\par \par Next visit spirometry pre-and postbronchodilator

## 2019-12-12 NOTE — REVIEW OF SYSTEMS
[Fatigue] : fatigue [Poor Appetite] : poor appetite [Nasal Congestion] : nasal congestion [Postnasal Drip] : postnasal drip [Negative] : Cardiovascular [As Noted in HPI] : as noted in HPI [Fever] : no fever [Dry Eyes] : no dryness of the eyes [Epistaxis] : no nosebleeds [Eye Irritation] : no ~T irritation of the eyes [Chills] : no chills [Sinus Problems] : no sinus problems [Sore Throat] : no sore throat [Dry Mouth] : no dry mouth [Mouth Ulcers] : no mouth ulcers

## 2019-12-13 ENCOUNTER — RX CHANGE (OUTPATIENT)
Age: 84
End: 2019-12-13

## 2020-01-17 ENCOUNTER — APPOINTMENT (OUTPATIENT)
Dept: GASTROENTEROLOGY | Facility: CLINIC | Age: 85
End: 2020-01-17
Payer: MEDICARE

## 2020-01-17 VITALS
WEIGHT: 118 LBS | BODY MASS INDEX: 23.79 KG/M2 | DIASTOLIC BLOOD PRESSURE: 80 MMHG | SYSTOLIC BLOOD PRESSURE: 132 MMHG | HEIGHT: 59 IN | TEMPERATURE: 98.4 F

## 2020-01-17 PROCEDURE — 99214 OFFICE O/P EST MOD 30 MIN: CPT

## 2020-01-17 NOTE — ASSESSMENT
[FreeTextEntry1] : 1. Diffuse abdominal pain mild nonspecific,  s/p ct colonography, underdistended sigmoid otherwise no mass noted\par us no gallstones,not improved on ppi\par \par plan recommend egd to r/o pud ,gastritis etc.\par       Discussed risks including but not limited to bleeding,infection,drug reaction, perforation,missed lesion,benefits and alternatives of colonoscopy/egd with patient  including no\par treatment and patient consents to procedure.\par \par 2. rectal pain probable hemorrhoids\par \par plan linzess continue\par         anusol prn\par          if symptoms persist recommend ct colonography

## 2020-01-17 NOTE — HISTORY OF PRESENT ILLNESS
[FreeTextEntry1] : 83 yo female with c/o abdominal pain epigastric,  and rectal pain. patient reports symptoms for years.\par patient takes ppi without much relief. patient had ct scan done Dr. Davis 2 days ago. for abdominal pain, results pending.  no weight loss, no f/c/ns. last bm today multiple small amounts. some nausea,no vomiting.\par \par

## 2020-01-17 NOTE — PHYSICAL EXAM
[General Appearance - Alert] : alert [General Appearance - In No Acute Distress] : in no acute distress [Sclera] : the sclera and conjunctiva were normal [Auscultation Breath Sounds / Voice Sounds] : lungs were clear to auscultation bilaterally [Respiration, Rhythm And Depth] : normal respiratory rhythm and effort [Heart Sounds] : normal S1 and S2 [Bowel Sounds] : normal bowel sounds [Heart Rate And Rhythm] : heart rate was normal and rhythm regular [Internal Hemorrhoid] : internal hemorrhoids [Abdomen Soft] : soft [No Rectal Mass] : no rectal mass [External Hemorrhoid] : external hemorrhoids [] : no rash [No CVA Tenderness] : no ~M costovertebral angle tenderness [Skin Lesions] : no skin lesions [Sensation] : the sensory exam was normal to light touch and pinprick [Motor Exam] : the motor exam was normal [Oriented To Time, Place, And Person] : oriented to person, place, and time [No Focal Deficits] : no focal deficits [FreeTextEntry1] : mild diffuse  tenderness on exam

## 2020-01-17 NOTE — REVIEW OF SYSTEMS
[Anxiety] : anxiety [As Noted in HPI] : as noted in HPI [Depression] : depression [Fever] : no fever [Eyesight Problems] : no eyesight problems [Discharge From Eyes] : no purulent discharge from the eyes [Chest Pain] : no chest pain [Shortness Of Breath] : no shortness of breath [Wheezing] : no wheezing [Easy Bleeding] : no tendency for easy bleeding [Easy Bruising] : no tendency for easy bruising

## 2020-01-21 ENCOUNTER — APPOINTMENT (OUTPATIENT)
Dept: ENDOCRINOLOGY | Facility: CLINIC | Age: 85
End: 2020-01-21

## 2020-02-05 ENCOUNTER — NON-APPOINTMENT (OUTPATIENT)
Age: 85
End: 2020-02-05

## 2020-02-05 ENCOUNTER — APPOINTMENT (OUTPATIENT)
Dept: INTERNAL MEDICINE | Facility: CLINIC | Age: 85
End: 2020-02-05
Payer: MEDICARE

## 2020-02-05 VITALS
HEIGHT: 59 IN | WEIGHT: 115 LBS | BODY MASS INDEX: 23.18 KG/M2 | DIASTOLIC BLOOD PRESSURE: 70 MMHG | HEART RATE: 80 BPM | OXYGEN SATURATION: 98 % | TEMPERATURE: 97.7 F | SYSTOLIC BLOOD PRESSURE: 134 MMHG

## 2020-02-05 VITALS — SYSTOLIC BLOOD PRESSURE: 144 MMHG | DIASTOLIC BLOOD PRESSURE: 70 MMHG

## 2020-02-05 DIAGNOSIS — Z80.9 FAMILY HISTORY OF MALIGNANT NEOPLASM, UNSPECIFIED: ICD-10-CM

## 2020-02-05 PROCEDURE — 93000 ELECTROCARDIOGRAM COMPLETE: CPT

## 2020-02-05 PROCEDURE — 99214 OFFICE O/P EST MOD 30 MIN: CPT | Mod: 25

## 2020-02-05 RX ORDER — HYDROCORTISONE ACETATE 25 MG/1
25 SUPPOSITORY RECTAL TWICE DAILY
Qty: 56 | Refills: 1 | Status: DISCONTINUED | COMMUNITY
Start: 2020-01-17 | End: 2020-02-05

## 2020-02-05 RX ORDER — BUDESONIDE AND FORMOTEROL FUMARATE DIHYDRATE 160; 4.5 UG/1; UG/1
160-4.5 AEROSOL RESPIRATORY (INHALATION)
Qty: 1 | Refills: 6 | Status: DISCONTINUED | COMMUNITY
Start: 2019-05-23 | End: 2020-02-05

## 2020-02-05 RX ORDER — PREDNISONE 10 MG/1
10 TABLET ORAL
Qty: 20 | Refills: 0 | Status: DISCONTINUED | COMMUNITY
Start: 2019-12-12 | End: 2020-02-05

## 2020-02-05 RX ORDER — LEVOCETIRIZINE DIHYDROCHLORIDE 5 MG/1
5 TABLET ORAL DAILY
Qty: 90 | Refills: 2 | Status: DISCONTINUED | COMMUNITY
Start: 2019-08-21 | End: 2020-02-05

## 2020-02-05 RX ORDER — AMOXICILLIN AND CLAVULANATE POTASSIUM 500; 125 MG/1; MG/1
500-125 TABLET, FILM COATED ORAL
Qty: 14 | Refills: 0 | Status: DISCONTINUED | COMMUNITY
Start: 2019-12-12 | End: 2020-02-05

## 2020-02-05 NOTE — PHYSICAL EXAM
[Normal Appearance] : normal in appearance [No Masses] : no palpable masses [de-identified] : tenderness on palpation on L chest and medial R chest [de-identified] : d

## 2020-02-05 NOTE — HISTORY OF PRESENT ILLNESS
[FreeTextEntry8] : hospital f/u- hospitalized 1/20-1/22- hospitalized at [facility] for L side CP, HA and elev BP- no fever\par reviewed labs brought in by pt.  now CP only on palpation. - now CP is better- mild and only palpation.  CP started Sun, last wk.\par intermittent pain.  no allev factor. CP was worse w palpation.\par pt thinks elev BP caused HTN. started coreg by cardiology last wk\par CT angiogram- LAD- 50-75%plaque.  calcium scoring LAD 68- pt refused cath\par pt saw her cardio- 1 wk ago and he told her cath was not necessary\par  pt has limited physical activity. no CP but has dyspnea w ambulating 1 block.  \par \par pt has chronic cough- elroy winter.  pt was seen by pulm and dx w asthmatic bronchitis. wheezing 4-5 times/ wk.  not using any inhalers now\par pt has chronic L side abd pain.  pt being f/u by GI.  9/19- CT colonography- diverticulosis,  limited eval of sigmoid colon\par 7/19 US abd- L side renal calculus.  C T abd 1/15/20- [facility]. [facility]- calcified pulm nodule, pleural scarring  , hepatic cyst, renal cyst,  thickening bladder wall

## 2020-02-05 NOTE — HISTORY OF PRESENT ILLNESS
[FreeTextEntry8] : hospital f/u- hospitalized 1/20-1/22- hospitalized at [facility] for L side CP, HA and elev BP- no fever\par reviewed labs brought in by pt.  now CP only on palpation. - now CP is better- mild and only palpation.  CP started Sun, last wk.\par intermittent pain.  no allev factor. CP was worse w palpation.\par pt thinks elev BP caused HTN. started coreg by cardiology last wk\par CT angiogram- LAD- 50-75%plaque.  calcium scoring LAD 68- pt refused cath\par pt saw her cardio- 1 wk ago and he told her cath was not necessary\par  pt has limited physical activity. no CP but has dyspnea w ambulating 1 block.  \par \par pt has chronic cough- elroy winter.  pt was seen by pulm and dx w asthmatic bronchitis. wheezing 4-5 times/ wk.  not using any inhalers now\par pt has chronic L side abd pain.  pt being f/u by GI.  9/19- CT colonography- diverticulosis,  limited eval of sigmoid colon\par 7/19 US abd- L side renal calculus.  C T abd 1/15/20- [facility]. [facility]- calcified pulm nodule, pleural scarring  , hepatic cyst, renal cyst,  thickening bladder wall

## 2020-02-05 NOTE — PHYSICAL EXAM
[Normal Appearance] : normal in appearance [No Masses] : no palpable masses [de-identified] : tenderness on palpation on L chest and medial R chest [de-identified] : d

## 2020-02-05 NOTE — PLAN
[FreeTextEntry1] : CAD-I rec pt get a 2nd opinion from another cardiologist regarding cath- I told pt she prob does need a cath.  pt refused 2nd opinion.\par HTN- pt recently started coreg\par dyspnea on exertion- trial of qvar

## 2020-02-06 RX ORDER — BECLOMETHASONE DIPROPIONATE HFA 80 UG/1
80 AEROSOL, METERED RESPIRATORY (INHALATION) TWICE DAILY
Qty: 1 | Refills: 0 | Status: DISCONTINUED | COMMUNITY
Start: 2020-02-05 | End: 2020-02-06

## 2020-02-07 ENCOUNTER — APPOINTMENT (OUTPATIENT)
Dept: OPHTHALMOLOGY | Facility: CLINIC | Age: 85
End: 2020-02-07
Payer: MEDICARE

## 2020-02-07 ENCOUNTER — NON-APPOINTMENT (OUTPATIENT)
Age: 85
End: 2020-02-07

## 2020-02-07 PROCEDURE — 92012 INTRM OPH EXAM EST PATIENT: CPT | Mod: NC

## 2020-02-10 LAB
APPEARANCE: ABNORMAL
BACTERIA UR CULT: ABNORMAL
BACTERIA: ABNORMAL
BILIRUBIN URINE: NEGATIVE
BLOOD URINE: NORMAL
COLOR: YELLOW
GLUCOSE QUALITATIVE U: NEGATIVE
HYALINE CASTS: 1 /LPF
KETONES URINE: NEGATIVE
LEUKOCYTE ESTERASE URINE: ABNORMAL
MICROSCOPIC-UA: NORMAL
NITRITE URINE: POSITIVE
PH URINE: 7.5
PROTEIN URINE: NORMAL
RED BLOOD CELLS URINE: 10 /HPF
SPECIFIC GRAVITY URINE: 1.02
SQUAMOUS EPITHELIAL CELLS: 1 /HPF
UROBILINOGEN URINE: NORMAL
WHITE BLOOD CELLS URINE: 713 /HPF

## 2020-02-14 ENCOUNTER — APPOINTMENT (OUTPATIENT)
Dept: ENDOCRINOLOGY | Facility: CLINIC | Age: 85
End: 2020-02-14
Payer: MEDICARE

## 2020-02-14 VITALS
SYSTOLIC BLOOD PRESSURE: 122 MMHG | BODY MASS INDEX: 22.78 KG/M2 | OXYGEN SATURATION: 99 % | WEIGHT: 113 LBS | HEIGHT: 59 IN | HEART RATE: 75 BPM | DIASTOLIC BLOOD PRESSURE: 55 MMHG

## 2020-02-14 PROCEDURE — 96372 THER/PROPH/DIAG INJ SC/IM: CPT

## 2020-02-14 PROCEDURE — 99204 OFFICE O/P NEW MOD 45 MIN: CPT | Mod: 25

## 2020-02-14 RX ORDER — DENOSUMAB 60 MG/ML
60 INJECTION SUBCUTANEOUS
Qty: 1 | Refills: 0 | Status: COMPLETED | OUTPATIENT
Start: 2020-02-14

## 2020-02-14 RX ADMIN — DENOSUMAB 0 MG/ML: 60 INJECTION SUBCUTANEOUS at 00:00

## 2020-02-14 NOTE — PHYSICAL EXAM
[Alert] : alert [No Acute Distress] : no acute distress [Well Nourished] : well nourished [Well Developed] : well developed [No Proptosis] : no proptosis [EOMI] : extra ocular movement intact [Normal Hearing] : hearing was normal [No Respiratory Distress] : no respiratory distress [Normal Rate and Effort] : normal respiratory rhythm and effort [No Accessory Muscle Use] : no accessory muscle use [Clear to Auscultation] : lungs were clear to auscultation bilaterally [Normal Rate] : heart rate was normal  [Normal S1, S2] : normal S1 and S2 [Normal Bowel Sounds] : normal bowel sounds [Not Tender] : non-tender [No Joint Swelling] : no joint swelling seen [Soft] : abdomen soft [Normal Strength/Tone] : muscle strength and tone were normal [No Motor Deficits] : the motor exam was normal [No Tremors] : no tremors [Normal Insight/Judgement] : insight and judgment were intact [Normal Affect] : the affect was normal [Normal Mood] : the mood was normal

## 2020-02-14 NOTE — CONSULT LETTER
[Dear  ___] : Dear  [unfilled], [Consult Letter:] : I had the pleasure of evaluating your patient, [unfilled]. [Please see my note below.] : Please see my note below. [Consult Closing:] : Thank you very much for allowing me to participate in the care of this patient.  If you have any questions, please do not hesitate to contact me. [Sincerely,] : Sincerely, [DrSebas  ___] : Dr. KAUFMAN [FreeTextEntry3] : Judith Garcia MD

## 2020-02-14 NOTE — REVIEW OF SYSTEMS
[Nausea] : nausea [Negative] : Constitutional [All other systems negative] : All other systems negative [Fatigue] : no fatigue [Chest Pain] : no chest pain [Palpitations] : no palpitations [Shortness Of Breath] : no shortness of breath [Wheezing] : no wheezing was heard [Cough] : no cough [SOB on Exertion] : no shortness of breath during exertion [Vomiting] : no vomiting was observed [Constipation] : no constipation [Diarrhea] : no diarrhea [Poor Balance] : poor balance [Depression] : no depression [de-identified] : Has back pain, arm pain, multiple areas of pain

## 2020-02-14 NOTE — ASSESSMENT
[FreeTextEntry1] : Patient is an 83 yo woman with osteoporosis, prediabetes, HTN and HLD\par \par 1. Osteoporosis\par -patient has severe osteoporosis with hx of wrist fracture last year.  She is at high risk for fractures based on poor balance, rheumatoid history as well\par -she had deferred treatment for many years and is willing to start\par -recommend Prolia for her for fracture risk reduction. Hypocalcemia, ONJ/AFF side effects were discussed.  No plans for major dental work\par -recommend PCP provide referral for physical therapy if deemed appropriate. Her gait is steady at this time but states that when she goes more than one block, loses balances and feels weak\par -fall precautions discussed\par -check vitamin D levels\par -first dose of Prolia provided today\par \par 2. Prediabetes\par -repeat A1c\par \par 3. HLD\par -pravastatin\par \par 4. HTN\par -BP goal < 140/90 [Denosumab Therapy] : Risks  and benefits of denosumab therapy were discussed with the patient including eczema, cellulitis, osteonecrosis of the jaw and atypical femur fractures

## 2020-02-14 NOTE — HISTORY OF PRESENT ILLNESS
[Kidney Stones] : a history of kidney stones [Prolia (Denosumab)] : Prolia (Denosumab) [Taking Steroids] : a history of taking steroids [Frequent Falls] : frequent falls [Family History of Osteoporosis] : family history of osteoporosis [Family History of Hip Fracture] : family history of hip fracture [Regular Dental Follow-Up] : regular dental follow-up [Previous Fragility Fracture] : previous fragility fracture(s) [FreeTextEntry1] : Patient is an 85 yo woman HTN, HLD, kidney stones and prediabetes establishing endocrine care for osteoporosis\par \par Diagnosed with osteoporosis more than 10 years ago.  She was diagnosed in [place] at the time and injections were prescribed. Had three doses of prolia at the beginning but stopped.  Has been off of any osteoporosis medicines since that time.  Denies taking any oral bisphosphonates. Was a loss to DXA surveillance as well.  Fractured right wrist requiring surgery.  States she was walking in her apartment and lost balance.  No hip fractures.  History of rheumatoid arthritis and kidney stones.  The kidney stone episode was many years ago and was treated in the ED. Has not had recent kidney stone episode\par Episode of steroid use for asthma\par Menopause: hysterectomy at 37 years old, unsure of why she had hysterectomy\par Takes one multivitamin\par No alcohol\par High risk fall and loss of balance; does not walk with an assisted device\par 58 year old daughter from pancreatic cancer\par Patient lives alone and cooks her meals\par Diet: fruit, vegetables, drink milk with cereal, not much dairy intake\par \par \par August 14, 2019\par Spine T -4.2\par Femoral neck T -3.8\par \par January 13, 2020\par Calcium 9.5\par GFR 46\par TSH 1.440\par Free T4 1.09 [Excessive Alcohol Intake] : no past or present history of excessive alcohol intake [Excessive Soda] : no past or present history of excessive soda intake [High Fall Risk] : no fall risk [Uses a Walker/Cane] : no use of a walker/cane [Family History of Breast Cancer] : no family history of breast cancer [Hyperparathyroidism] : no hyperparathyroidism [de-identified] : Last dental visit: 2 years ago; upper and lower dentures and lower implants; mother with hip fracture

## 2020-02-18 LAB
25(OH)D3 SERPL-MCNC: 41.8 NG/ML
ALBUMIN SERPL ELPH-MCNC: 4.6 G/DL
ALP BLD-CCNC: 76 U/L
ALT SERPL-CCNC: 8 U/L
ANION GAP SERPL CALC-SCNC: 15 MMOL/L
AST SERPL-CCNC: 15 U/L
BILIRUB SERPL-MCNC: 0.3 MG/DL
BUN SERPL-MCNC: 26 MG/DL
CALCIUM SERPL-MCNC: 9.6 MG/DL
CALCIUM SERPL-MCNC: 9.6 MG/DL
CHLORIDE SERPL-SCNC: 103 MMOL/L
CO2 SERPL-SCNC: 21 MMOL/L
CREAT SERPL-MCNC: 1.13 MG/DL
ESTIMATED AVERAGE GLUCOSE: 117 MG/DL
GLUCOSE SERPL-MCNC: 90 MG/DL
HBA1C MFR BLD HPLC: 5.7 %
PARATHYROID HORMONE INTACT: 24 PG/ML
POTASSIUM SERPL-SCNC: 4.9 MMOL/L
PROT SERPL-MCNC: 6.9 G/DL
SODIUM SERPL-SCNC: 139 MMOL/L

## 2020-03-03 ENCOUNTER — APPOINTMENT (OUTPATIENT)
Dept: INTERNAL MEDICINE | Facility: CLINIC | Age: 85
End: 2020-03-03
Payer: MEDICARE

## 2020-03-03 VITALS
HEART RATE: 74 BPM | DIASTOLIC BLOOD PRESSURE: 50 MMHG | HEIGHT: 59 IN | SYSTOLIC BLOOD PRESSURE: 100 MMHG | BODY MASS INDEX: 22.98 KG/M2 | WEIGHT: 114 LBS | OXYGEN SATURATION: 97 % | TEMPERATURE: 98.9 F

## 2020-03-03 PROCEDURE — 99214 OFFICE O/P EST MOD 30 MIN: CPT | Mod: 25

## 2020-03-03 PROCEDURE — 36415 COLL VENOUS BLD VENIPUNCTURE: CPT

## 2020-03-03 RX ORDER — SULFAMETHOXAZOLE AND TRIMETHOPRIM 800; 160 MG/1; MG/1
800-160 TABLET ORAL TWICE DAILY
Qty: 6 | Refills: 0 | Status: DISCONTINUED | COMMUNITY
Start: 2020-02-07 | End: 2020-03-03

## 2020-03-03 NOTE — HISTORY OF PRESENT ILLNESS
[FreeTextEntry1] : asthma, multip issues [de-identified] : bladder wall thickening- pt states she has appt w Urology- Dr. Lata Fine- dysuria resolved after abt\par pt had chronic L side abd pain- pt had CT colonography- diverticulosis, limited evaluation of sigmoid.  pt f/u w GI and will yana EGD\par IGT- reviewed diet w pt\par RUQ pain- started 5 days ago-triggered by meals. no hx of similar abd pain. pt c/o nauseous- after meals- not related to food.  intermittent pain\par duration- sev hr.  not related to activity as per pt.  no trauma.  pain on palpation and more pain when laying on that side.\par osteoporosis- pt started prolia w endo\par CAD- pt sees Dr. Gastelum\par HTN- on coreg.\par asthma- better on inhaler.  pt has appt w pulm- hx of calcified pulm nodule\par R lower leg pain- 10 yr- intermittent- duration: sev hr.  better w massage.  occurs on rest and exertion.

## 2020-03-03 NOTE — PHYSICAL EXAM
[Normal Gait] : normal gait [No Edema] : there was no peripheral edema [de-identified] : mild varicose veins [de-identified] : good ROM of R knee-no pain. no swelling. pt c/o pain-lateral to R shin

## 2020-03-04 LAB
ALBUMIN SERPL ELPH-MCNC: 4.3 G/DL
ALP BLD-CCNC: 72 U/L
ALT SERPL-CCNC: 9 U/L
APPEARANCE: CLEAR
AST SERPL-CCNC: 17 U/L
BACTERIA: NEGATIVE
BASOPHILS # BLD AUTO: 0.07 K/UL
BASOPHILS NFR BLD AUTO: 1 %
BILIRUB DIRECT SERPL-MCNC: 0.1 MG/DL
BILIRUB INDIRECT SERPL-MCNC: 0.1 MG/DL
BILIRUB SERPL-MCNC: 0.2 MG/DL
BILIRUBIN URINE: NEGATIVE
BLOOD URINE: NEGATIVE
COLOR: YELLOW
EOSINOPHIL # BLD AUTO: 0.2 K/UL
EOSINOPHIL NFR BLD AUTO: 2.8 %
GLUCOSE QUALITATIVE U: NEGATIVE
HCT VFR BLD CALC: 42.9 %
HGB BLD-MCNC: 13.4 G/DL
HYALINE CASTS: 0 /LPF
IMM GRANULOCYTES NFR BLD AUTO: 0.4 %
KETONES URINE: NEGATIVE
LEUKOCYTE ESTERASE URINE: NEGATIVE
LYMPHOCYTES # BLD AUTO: 1.75 K/UL
LYMPHOCYTES NFR BLD AUTO: 24.7 %
MAN DIFF?: NORMAL
MCHC RBC-ENTMCNC: 28.6 PG
MCHC RBC-ENTMCNC: 31.2 GM/DL
MCV RBC AUTO: 91.5 FL
MICROSCOPIC-UA: NORMAL
MONOCYTES # BLD AUTO: 0.69 K/UL
MONOCYTES NFR BLD AUTO: 9.7 %
NEUTROPHILS # BLD AUTO: 4.35 K/UL
NEUTROPHILS NFR BLD AUTO: 61.4 %
NITRITE URINE: NEGATIVE
PH URINE: 6.5
PLATELET # BLD AUTO: 184 K/UL
PROT SERPL-MCNC: 7.1 G/DL
PROTEIN URINE: NORMAL
RBC # BLD: 4.69 M/UL
RBC # FLD: 13.3 %
RED BLOOD CELLS URINE: 3 /HPF
SPECIFIC GRAVITY URINE: 1.02
SQUAMOUS EPITHELIAL CELLS: 1 /HPF
UROBILINOGEN URINE: NORMAL
WBC # FLD AUTO: 7.09 K/UL
WHITE BLOOD CELLS URINE: 2 /HPF

## 2020-03-10 ENCOUNTER — FORM ENCOUNTER (OUTPATIENT)
Age: 85
End: 2020-03-10

## 2020-03-11 ENCOUNTER — OUTPATIENT (OUTPATIENT)
Dept: OUTPATIENT SERVICES | Facility: HOSPITAL | Age: 85
LOS: 1 days | End: 2020-03-11
Payer: MEDICARE

## 2020-03-11 ENCOUNTER — APPOINTMENT (OUTPATIENT)
Dept: RADIOLOGY | Facility: CLINIC | Age: 85
End: 2020-03-11
Payer: MEDICARE

## 2020-03-11 ENCOUNTER — APPOINTMENT (OUTPATIENT)
Dept: ULTRASOUND IMAGING | Facility: CLINIC | Age: 85
End: 2020-03-11
Payer: MEDICARE

## 2020-03-11 DIAGNOSIS — M79.669 PAIN IN UNSPECIFIED LOWER LEG: ICD-10-CM

## 2020-03-11 DIAGNOSIS — R10.811 RIGHT UPPER QUADRANT ABDOMINAL TENDERNESS: ICD-10-CM

## 2020-03-11 DIAGNOSIS — Z90.710 ACQUIRED ABSENCE OF BOTH CERVIX AND UTERUS: Chronic | ICD-10-CM

## 2020-03-11 DIAGNOSIS — Z98.890 OTHER SPECIFIED POSTPROCEDURAL STATES: Chronic | ICD-10-CM

## 2020-03-11 DIAGNOSIS — Z98.41 CATARACT EXTRACTION STATUS, RIGHT EYE: Chronic | ICD-10-CM

## 2020-03-11 PROCEDURE — 73590 X-RAY EXAM OF LOWER LEG: CPT

## 2020-03-11 PROCEDURE — 76700 US EXAM ABDOM COMPLETE: CPT | Mod: 26

## 2020-03-11 PROCEDURE — 73590 X-RAY EXAM OF LOWER LEG: CPT | Mod: 26,RT

## 2020-03-11 PROCEDURE — 76700 US EXAM ABDOM COMPLETE: CPT

## 2020-03-18 ENCOUNTER — APPOINTMENT (OUTPATIENT)
Dept: ULTRASOUND IMAGING | Facility: HOSPITAL | Age: 85
End: 2020-03-18

## 2020-04-28 ENCOUNTER — APPOINTMENT (OUTPATIENT)
Dept: INTERNAL MEDICINE | Facility: CLINIC | Age: 85
End: 2020-04-28
Payer: MEDICARE

## 2020-04-28 PROCEDURE — 99214 OFFICE O/P EST MOD 30 MIN: CPT | Mod: 95

## 2020-04-29 ENCOUNTER — APPOINTMENT (OUTPATIENT)
Dept: GASTROENTEROLOGY | Facility: CLINIC | Age: 85
End: 2020-04-29
Payer: MEDICARE

## 2020-04-29 PROCEDURE — 99443: CPT | Mod: CR

## 2020-04-30 ENCOUNTER — LABORATORY RESULT (OUTPATIENT)
Age: 85
End: 2020-04-30

## 2020-05-01 ENCOUNTER — APPOINTMENT (OUTPATIENT)
Dept: INTERNAL MEDICINE | Facility: CLINIC | Age: 85
End: 2020-05-01
Payer: MEDICARE

## 2020-05-01 PROCEDURE — 99442: CPT | Mod: CR

## 2020-05-12 ENCOUNTER — APPOINTMENT (OUTPATIENT)
Dept: INTERNAL MEDICINE | Facility: CLINIC | Age: 85
End: 2020-05-12
Payer: MEDICARE

## 2020-05-12 PROCEDURE — 99442: CPT | Mod: CR

## 2020-05-12 NOTE — PLAN
[FreeTextEntry1] : asthma- stable.  will not yet wean down on flovent.  pt has allergies and asthma maybe exaccerb. during this time\par allergy- pt can take allegra if necessary\par HTN- stable.\par  15  minutes minimal was spent with patient and >50% of the time spent in the encounter involved counseling and coordination of care for any and all diagnosis submitted.\par \par

## 2020-05-12 NOTE — HISTORY OF PRESENT ILLNESS
[Verbal consent obtained from patient] : the patient, [unfilled] [FreeTextEntry1] : HTN [de-identified] : This visit was completed via telephone due to the restriction of COVID 19 pandemic.  All issue below were discussed and addressed but no physical exam was preformed.  If it was felt that the patient should be evaluated in the clinic then they were directed there. Patient verbally consented to the visit.\par abd pain is better.- now at [facility]\par HTN- home /61 compliant w meds, diet.  no CP or SOB\par asthma- last episode of wheezing - mild - 2 wk ago.  occasional cough- 3 times/ wk- at night- a few sec- pt uses flovent 2 times/ day. no chest tightness\par pt has mild sneezing, rhinitis in the AM.\par IGT- reviewed diet.

## 2020-05-18 ENCOUNTER — TRANSCRIPTION ENCOUNTER (OUTPATIENT)
Age: 85
End: 2020-05-18

## 2020-05-18 NOTE — ASU PREOP CHECKLIST - VERIFY SURGICAL SITE/SIDE WITH PATIENT
39 y/o morbidly obese M with a PMHx of hx of R sided heart failure (normalLVEF with severely reduced RV function), severe pulmonary HTN on home O2, JAN on CPAP, and IDDM2 who was recently admitted to [facility] from (04/28-/05/04/20) for heart failure exacerbation presented to the ED today with complaints of dyspnea on exertion and leg swelling. Patient states that he only felt better for a day or 2 after discharge, and since has been experiencing worsening dyspnea on exertion, leg swelling, and abdominal distension.    5/17: Pt resting comfortably in bed. No signs of resp. distress. Lungs clear to auscultation Bilateral lower extremity edema resolving. CM NSR HR @ 70-80s. Cont. current medication regimen. Awaiting PT eval for dispo planning.  05/18:  Patient states that he feels as though he is improving at this time, still on a Bumex gtt. Plan now for home PT upon discharge, will confirm with primary team. Patient states his legs seem greatly improved. Still with some baseline dyspnea.     Severe Pulmonary HTN with Right Heart Failure  - , down from previous admission  - Continue Bumex IV infusion at 0.5mg/hr  - Continue Metolazone, sildenafil, and coreg.   - Increase Orenitram to 2 tabs (0.25mg) PO TID   - Monitor on telemetry.    - Strict i/o and daily standing weights.    - Keep K > 4, Mg > 2.    - Monitor renal function with ongoing diuresis.    Hypokalemia  - Due to diuretic use  - Keep K>4, Mg>2  - Likely cause of ventricular ectopy. Continue supplementation and coreg.     Prognosis  - Pt with difficulty ambulating and getting up stairs. unable to properly care for self.  - Pt recommending acute rehab vs. home PT, per primary team note plan for D/C home with PT? Will confirm.     Preliminary evaluation, please await official recommendations by Dr. Cox done right wrist/done

## 2020-05-19 ENCOUNTER — APPOINTMENT (OUTPATIENT)
Dept: INTERNAL MEDICINE | Facility: CLINIC | Age: 85
End: 2020-05-19
Payer: MEDICARE

## 2020-05-19 PROCEDURE — 99442: CPT | Mod: 95

## 2020-05-19 RX ORDER — PRAVASTATIN SODIUM 20 MG/1
20 TABLET ORAL
Qty: 90 | Refills: 3 | Status: DISCONTINUED | COMMUNITY
End: 2020-05-19

## 2020-05-21 NOTE — HISTORY OF PRESENT ILLNESS
[FreeTextEntry8] : asthma- pt confused between rescue inhaler and flovent\par pt had cough and wheezing last night.  pt had cough- started 2 days ago.\par wheezing - only last 2 days.  using flovent 2 puff only in the AM. \par no fever,  mild sore throat\par rhinitis- started 2 days. mild sneezing, itchy eyes and nose- not taking allegra

## 2020-05-21 NOTE — PLAN
[FreeTextEntry1] : asthma- pt told to incr flovent to BID\par allergies- compliance w allegra discussed.  add flovent if allergies not controlled\par pt to call if symptoms are worse.\par 15   minutes minimal was spent with patient and >50% of the time spent in the encounter involved counseling and coordination of care for any and all diagnosis submitted.\par \par

## 2020-06-18 ENCOUNTER — APPOINTMENT (OUTPATIENT)
Dept: INTERNAL MEDICINE | Facility: CLINIC | Age: 85
End: 2020-06-18

## 2020-08-10 ENCOUNTER — LABORATORY RESULT (OUTPATIENT)
Age: 85
End: 2020-08-10

## 2020-08-10 ENCOUNTER — APPOINTMENT (OUTPATIENT)
Dept: INTERNAL MEDICINE | Facility: CLINIC | Age: 85
End: 2020-08-10
Payer: MEDICARE

## 2020-08-10 VITALS
DIASTOLIC BLOOD PRESSURE: 68 MMHG | BODY MASS INDEX: 22.18 KG/M2 | SYSTOLIC BLOOD PRESSURE: 120 MMHG | TEMPERATURE: 96.7 F | OXYGEN SATURATION: 98 % | WEIGHT: 110 LBS | HEART RATE: 85 BPM | HEIGHT: 59 IN

## 2020-08-10 PROCEDURE — 36415 COLL VENOUS BLD VENIPUNCTURE: CPT

## 2020-08-10 PROCEDURE — 81002 URINALYSIS NONAUTO W/O SCOPE: CPT

## 2020-08-10 PROCEDURE — 99214 OFFICE O/P EST MOD 30 MIN: CPT | Mod: 25

## 2020-08-10 RX ORDER — LORAZEPAM 0.5 MG/1
0.5 TABLET ORAL
Qty: 30 | Refills: 0 | Status: DISCONTINUED | COMMUNITY
Start: 2019-07-10 | End: 2020-08-10

## 2020-08-10 NOTE — PHYSICAL EXAM
[de-identified] : R inguinal hernia. diffuse abd pain.  b/l flank discomfort [de-identified] : itching,scaly rash feet

## 2020-08-10 NOTE — HISTORY OF PRESENT ILLNESS
[FreeTextEntry8] : CC: itchy\par itchying for sev yr- Her Dermatologist is in [place] and was given multiple creams and her Dermatologist office is closed.  pt states she was prev  given clobestol.  itching worse on feet\par c/o pressure- in the vagina- mainly w urination\par no dysuria or fever.  chronic urinary freq\par thickened bladder wall- pt has to still f/u w Urology\par b/l flank pain- 1 wk- intermittent- duration- up to 2 hr- daily pain.  pain can occur in any position. no alleviating or aggrav factors.- occasional radiates to the abd\par diffuse abd pain after breakfast, lunch- last few a few hr- elroy RUQ pain- no fever\par 1/2020- CT abd- liver cyst, kidney cyst, thicken bladder wall\par HTN- compliant w meds.  no CP\par chol- compliant w meds.\par using alprazolam for sleep\par asthma- mild wheezing a few day- occurs less than 1/wk

## 2020-08-10 NOTE — PLAN
[FreeTextEntry1] : insomnia- avoid alprazolam. use trazodone\par UA dip mod leukocytes\par if pain worse- pt to go to the ER\par pt to [facility] telehealth in 3 days

## 2020-08-11 ENCOUNTER — OUTPATIENT (OUTPATIENT)
Dept: OUTPATIENT SERVICES | Facility: HOSPITAL | Age: 85
LOS: 1 days | End: 2020-08-11
Payer: MEDICARE

## 2020-08-11 ENCOUNTER — APPOINTMENT (OUTPATIENT)
Dept: CT IMAGING | Facility: CLINIC | Age: 85
End: 2020-08-11
Payer: MEDICARE

## 2020-08-11 DIAGNOSIS — Z00.00 ENCOUNTER FOR GENERAL ADULT MEDICAL EXAMINATION WITHOUT ABNORMAL FINDINGS: ICD-10-CM

## 2020-08-11 DIAGNOSIS — Z98.890 OTHER SPECIFIED POSTPROCEDURAL STATES: Chronic | ICD-10-CM

## 2020-08-11 DIAGNOSIS — R10.9 UNSPECIFIED ABDOMINAL PAIN: ICD-10-CM

## 2020-08-11 DIAGNOSIS — Z98.41 CATARACT EXTRACTION STATUS, RIGHT EYE: Chronic | ICD-10-CM

## 2020-08-11 DIAGNOSIS — N32.89 OTHER SPECIFIED DISORDERS OF BLADDER: ICD-10-CM

## 2020-08-11 DIAGNOSIS — Z90.710 ACQUIRED ABSENCE OF BOTH CERVIX AND UTERUS: Chronic | ICD-10-CM

## 2020-08-11 DIAGNOSIS — R10.84 GENERALIZED ABDOMINAL PAIN: ICD-10-CM

## 2020-08-11 LAB
ALBUMIN SERPL ELPH-MCNC: 4.6 G/DL
ALP BLD-CCNC: 64 U/L
ALT SERPL-CCNC: 12 U/L
ANION GAP SERPL CALC-SCNC: 10 MMOL/L
APPEARANCE: CLEAR
AST SERPL-CCNC: 20 U/L
BASOPHILS # BLD AUTO: 0.09 K/UL
BASOPHILS NFR BLD AUTO: 1.1 %
BILIRUB SERPL-MCNC: <0.2 MG/DL
BILIRUB UR QL STRIP: NEGATIVE
BILIRUBIN URINE: NEGATIVE
BLOOD URINE: NORMAL
BUN SERPL-MCNC: 20 MG/DL
CALCIUM SERPL-MCNC: 10.3 MG/DL
CHLORIDE SERPL-SCNC: 104 MMOL/L
CLARITY UR: NORMAL
CO2 SERPL-SCNC: 25 MMOL/L
COLLECTION METHOD: NORMAL
COLOR: NORMAL
CREAT SERPL-MCNC: 0.91 MG/DL
EOSINOPHIL # BLD AUTO: 0.25 K/UL
EOSINOPHIL NFR BLD AUTO: 3.2 %
GLUCOSE QUALITATIVE U: NEGATIVE
GLUCOSE SERPL-MCNC: 101 MG/DL
GLUCOSE UR-MCNC: NEGATIVE
HCG UR QL: 0.2 EU/DL
HCT VFR BLD CALC: 44 %
HGB BLD-MCNC: 13.6 G/DL
HGB UR QL STRIP.AUTO: NORMAL
IMM GRANULOCYTES NFR BLD AUTO: 0.8 %
KETONES UR-MCNC: NEGATIVE
KETONES URINE: NEGATIVE
LEUKOCYTE ESTERASE UR QL STRIP: NORMAL
LEUKOCYTE ESTERASE URINE: ABNORMAL
LYMPHOCYTES # BLD AUTO: 1.85 K/UL
LYMPHOCYTES NFR BLD AUTO: 23.6 %
MAN DIFF?: NORMAL
MCHC RBC-ENTMCNC: 28.5 PG
MCHC RBC-ENTMCNC: 30.9 GM/DL
MCV RBC AUTO: 92.1 FL
MONOCYTES # BLD AUTO: 0.7 K/UL
MONOCYTES NFR BLD AUTO: 8.9 %
NEUTROPHILS # BLD AUTO: 4.9 K/UL
NEUTROPHILS NFR BLD AUTO: 62.4 %
NITRITE UR QL STRIP: NEGATIVE
NITRITE URINE: NEGATIVE
PH UR STRIP: 6.5
PH URINE: 6.5
PLATELET # BLD AUTO: 208 K/UL
POTASSIUM SERPL-SCNC: 4.9 MMOL/L
PROT SERPL-MCNC: 7.8 G/DL
PROT UR STRIP-MCNC: NEGATIVE
PROTEIN URINE: NEGATIVE
RBC # BLD: 4.78 M/UL
RBC # FLD: 13.2 %
SODIUM SERPL-SCNC: 139 MMOL/L
SP GR UR STRIP: 1.01
SPECIFIC GRAVITY URINE: 1.01
UROBILINOGEN URINE: NORMAL
WBC # FLD AUTO: 7.85 K/UL

## 2020-08-11 PROCEDURE — 74176 CT ABD & PELVIS W/O CONTRAST: CPT | Mod: 26

## 2020-08-11 PROCEDURE — 74176 CT ABD & PELVIS W/O CONTRAST: CPT

## 2020-08-12 ENCOUNTER — APPOINTMENT (OUTPATIENT)
Dept: GASTROENTEROLOGY | Facility: CLINIC | Age: 85
End: 2020-08-12
Payer: MEDICARE

## 2020-08-12 VITALS
WEIGHT: 108 LBS | SYSTOLIC BLOOD PRESSURE: 164 MMHG | HEIGHT: 59 IN | RESPIRATION RATE: 16 BRPM | TEMPERATURE: 99.1 F | HEART RATE: 84 BPM | BODY MASS INDEX: 21.77 KG/M2 | DIASTOLIC BLOOD PRESSURE: 70 MMHG | OXYGEN SATURATION: 98 %

## 2020-08-12 PROCEDURE — 99214 OFFICE O/P EST MOD 30 MIN: CPT

## 2020-08-12 NOTE — REVIEW OF SYSTEMS
[Fever] : no fever [Chills] : no chills [Loss Of Hearing] : no hearing loss [Cough] : no cough [SOB on Exertion] : no shortness of breath during exertion [Joint Pain] : no joint pain [Arthralgias] : no arthralgias [Depression] : no depression [Anxiety] : no anxiety [Muscle Weakness] : no muscle weakness [Easy Bruising] : no tendency for easy bruising [Easy Bleeding] : no tendency for easy bleeding

## 2020-08-12 NOTE — HISTORY OF PRESENT ILLNESS
[FreeTextEntry1] : 84 yo female with chronic abdominal pain for  years , since at least  01/2020, patient  did telehealth in 04/2020 given linzess, reports helped a little. had ct scan done yesterday result pending had bm after ct scan. patient tolerating po. no brbpr, no melena.  patient reports few lb weight loss. no f/c/ns.\par \par patietn reports feel depressed, not suicidal.\par \par

## 2020-08-12 NOTE — PHYSICAL EXAM
[General Appearance - Alert] : alert [General Appearance - Well Nourished] : well nourished [General Appearance - In No Acute Distress] : in no acute distress [Sclera] : the sclera and conjunctiva were normal [General Appearance - Well Developed] : well developed [] : the neck was supple [Respiration, Rhythm And Depth] : normal respiratory rhythm and effort [Auscultation Breath Sounds / Voice Sounds] : lungs were clear to auscultation bilaterally [Heart Rate And Rhythm] : heart rate was normal and rhythm regular [Heart Sounds] : normal S1 and S2 [Bowel Sounds] : normal bowel sounds [Abdomen Soft] : soft [Abdomen Tenderness] : non-tender [FreeTextEntry1] : voluntary guarding, [Abnormal Walk] : normal gait [No Focal Deficits] : no focal deficits [Nail Clubbing] : no clubbing  or cyanosis of the fingernails [Oriented To Time, Place, And Person] : oriented to person, place, and time

## 2020-08-12 NOTE — ASSESSMENT
[FreeTextEntry1] : chronic vague abdominal pain, also with c/o epigastric pain and gerd, and constipationgiven weight loss recommend egd/colonoscopy r/o pud, colon lesion etc\par abdominal pain may be related to constipation, normal cbc\par Discussed risks including but not limited to bleeding,infection,drug reaction, perforation,missed lesion,benefits and alternatives of colonoscopy/egd with patient  including no\par treatment and patient consents to procedure.\par \par \par plan recommend f/u ct scan per \par         egd/colonoscopy to be scheduled\par           mom for for constipation\par          f/u with pcp regarding anxiety/depression\par          continue ppi daily

## 2020-08-13 ENCOUNTER — APPOINTMENT (OUTPATIENT)
Dept: INTERNAL MEDICINE | Facility: CLINIC | Age: 85
End: 2020-08-13
Payer: MEDICARE

## 2020-08-13 PROCEDURE — 99442: CPT | Mod: 95

## 2020-08-13 NOTE — HISTORY OF PRESENT ILLNESS
[de-identified] : abd pain- pt will yana EGD, colonoscopy.  pt was seen GI - pt now states she had this pain is chronic.  but now added soreness w urination.  pt is still taking abt.  again pt asking question about CT- thickening of the bladder wall.  cyst liver, nodularity in RML - increased\par pt will f/u w pulm\par pain w urination is improving.  \par  [FreeTextEntry1] : abd pain [Home] : at home, [unfilled] , at the time of the visit. [Medical Office: ([facility])___] : at the medical office located in  [Verbal consent obtained from patient] : the patient, [unfilled]

## 2020-08-13 NOTE — PLAN
[FreeTextEntry1] : 11 minutes minimal was spent with patient and >50% of the time spent in the encounter involved counseling and coordination of care for any and all diagnosis submitted.\par \par

## 2020-08-15 ENCOUNTER — APPOINTMENT (OUTPATIENT)
Dept: DISASTER EMERGENCY | Facility: CLINIC | Age: 85
End: 2020-08-15

## 2020-08-15 LAB — SARS-COV-2 N GENE NPH QL NAA+PROBE: NOT DETECTED

## 2020-08-17 ENCOUNTER — APPOINTMENT (OUTPATIENT)
Dept: ENDOCRINOLOGY | Facility: CLINIC | Age: 85
End: 2020-08-17

## 2020-08-18 ENCOUNTER — APPOINTMENT (OUTPATIENT)
Dept: GASTROENTEROLOGY | Facility: CLINIC | Age: 85
End: 2020-08-18
Payer: MEDICARE

## 2020-08-18 ENCOUNTER — LABORATORY RESULT (OUTPATIENT)
Age: 85
End: 2020-08-18

## 2020-08-18 VITALS — TEMPERATURE: 97.2 F

## 2020-08-18 PROCEDURE — 43239 EGD BIOPSY SINGLE/MULTIPLE: CPT

## 2020-08-18 PROCEDURE — 45378 DIAGNOSTIC COLONOSCOPY: CPT

## 2020-08-21 ENCOUNTER — APPOINTMENT (OUTPATIENT)
Dept: INTERNAL MEDICINE | Facility: CLINIC | Age: 85
End: 2020-08-21
Payer: MEDICARE

## 2020-08-21 VITALS
WEIGHT: 112 LBS | SYSTOLIC BLOOD PRESSURE: 124 MMHG | HEART RATE: 78 BPM | OXYGEN SATURATION: 98 % | DIASTOLIC BLOOD PRESSURE: 56 MMHG | RESPIRATION RATE: 16 BRPM | HEIGHT: 59 IN | BODY MASS INDEX: 22.58 KG/M2 | TEMPERATURE: 97.3 F

## 2020-08-21 PROCEDURE — 71046 X-RAY EXAM CHEST 2 VIEWS: CPT

## 2020-08-21 PROCEDURE — 94060 EVALUATION OF WHEEZING: CPT

## 2020-08-21 PROCEDURE — 99214 OFFICE O/P EST MOD 30 MIN: CPT | Mod: 25

## 2020-08-21 NOTE — REASON FOR VISIT
[Follow-Up] : a follow-up visit [COPD] : COPD [Asthma] : asthma [Shortness of Breath] : shortness of breath [TextBox_44] : Bronchiectasis

## 2020-08-21 NOTE — HISTORY OF PRESENT ILLNESS
[TextBox_4] : In the past several weeks the patient has had increased cough and shortness of breath. There has been no significant sputum production. No fever, chills, or sweats. A CT of the abdomen performed on 8/12/20 revealed findings compatible with bronchiectasis in the right middle lobe and an incidental finding of a granuloma in the left lower lobe. she has been using Flovent 220 one puff a day and albuterol p.r.n.-usually 2-3 times per day.

## 2020-08-21 NOTE — ASSESSMENT
[FreeTextEntry1] : a chest x-ray-PA and lateral reveals increased interstitial markings in both lobes, right greater than left.             Spirometry Pre-and postbronchodilator reveal moderate obstruction with a 20% improvement post bronchodilator. \par the patient appears to have an exacerbation of bronchiectasis with some asthmatic component. \par \par flovent 220 will be increased to one puff twice a day and she will continue albuterol p.r.n. doxycycline 500 mg b.i.d.will be started and continued for 10 days. She will be evaluated in 4 weeks or p.r.n.In the unlikely event of any further exacerbation.\par \par next visit spirometry pre-and postbronchodilator

## 2020-09-03 ENCOUNTER — OUTPATIENT (OUTPATIENT)
Dept: OUTPATIENT SERVICES | Facility: HOSPITAL | Age: 85
LOS: 1 days | End: 2020-09-03
Payer: MEDICARE

## 2020-09-03 ENCOUNTER — APPOINTMENT (OUTPATIENT)
Dept: ULTRASOUND IMAGING | Facility: HOSPITAL | Age: 85
End: 2020-09-03
Payer: MEDICARE

## 2020-09-03 ENCOUNTER — RESULT REVIEW (OUTPATIENT)
Age: 85
End: 2020-09-03

## 2020-09-03 DIAGNOSIS — M79.669 PAIN IN UNSPECIFIED LOWER LEG: ICD-10-CM

## 2020-09-03 DIAGNOSIS — Z90.710 ACQUIRED ABSENCE OF BOTH CERVIX AND UTERUS: Chronic | ICD-10-CM

## 2020-09-03 DIAGNOSIS — Z98.41 CATARACT EXTRACTION STATUS, RIGHT EYE: Chronic | ICD-10-CM

## 2020-09-03 DIAGNOSIS — Z98.890 OTHER SPECIFIED POSTPROCEDURAL STATES: Chronic | ICD-10-CM

## 2020-09-03 DIAGNOSIS — Z00.00 ENCOUNTER FOR GENERAL ADULT MEDICAL EXAMINATION WITHOUT ABNORMAL FINDINGS: ICD-10-CM

## 2020-09-03 PROCEDURE — 93925 LOWER EXTREMITY STUDY: CPT

## 2020-09-03 PROCEDURE — 93925 LOWER EXTREMITY STUDY: CPT | Mod: 26

## 2020-09-03 PROCEDURE — 73590 X-RAY EXAM OF LOWER LEG: CPT | Mod: 26,RT

## 2020-09-03 PROCEDURE — 73590 X-RAY EXAM OF LOWER LEG: CPT

## 2020-09-09 ENCOUNTER — MED ADMIN CHARGE (OUTPATIENT)
Age: 85
End: 2020-09-09

## 2020-09-09 ENCOUNTER — APPOINTMENT (OUTPATIENT)
Dept: ENDOCRINOLOGY | Facility: CLINIC | Age: 85
End: 2020-09-09
Payer: MEDICARE

## 2020-09-09 PROCEDURE — 96372 THER/PROPH/DIAG INJ SC/IM: CPT

## 2020-09-09 RX ORDER — DENOSUMAB 60 MG/ML
60 INJECTION SUBCUTANEOUS
Qty: 1 | Refills: 0 | Status: COMPLETED | OUTPATIENT
Start: 2020-09-09

## 2020-09-09 RX ADMIN — DENOSUMAB 0 MG/ML: 60 INJECTION SUBCUTANEOUS at 00:00

## 2020-09-18 ENCOUNTER — APPOINTMENT (OUTPATIENT)
Dept: INTERNAL MEDICINE | Facility: CLINIC | Age: 85
End: 2020-09-18
Payer: MEDICARE

## 2020-09-18 PROCEDURE — 99447 NTRPROF PH1/NTRNET/EHR 11-20: CPT

## 2020-09-18 NOTE — HISTORY OF PRESENT ILLNESS
[Home] : at home, [unfilled] , at the time of the visit. [Medical Office: ([facility])___] : at the medical office located in  [Verbal consent obtained from patient] : the patient, [unfilled] [FreeTextEntry1] : mild sore throat .  other issues [de-identified] : HTN- today 165/84, 150's/87.  last wk 139/60 . compliant w meds\par chol- pt compliant w meds\par US art reviewed w pt\par still has leg cramps at night\par \par Ct thickening bladder- pt states she was seen Dr. Ferrari, Urology- wants to see another MD\par asthma/ COPD, bronchietasis- pt was seen by pulm - has f/u appt w next wk\par meds reivewed w meds

## 2020-09-18 NOTE — PLAN
[FreeTextEntry1] : leg cramps- pt will try stationary bike prior to bedtime\par pt to have Urology records released to me\par rec flu vac\par 17   minutes minimal was spent with patient and >50% of the time spent in the encounter involved counseling and coordination of care for any and all diagnosis submitted.\par \par

## 2020-09-19 ENCOUNTER — TRANSCRIPTION ENCOUNTER (OUTPATIENT)
Age: 85
End: 2020-09-19

## 2020-09-21 ENCOUNTER — APPOINTMENT (OUTPATIENT)
Dept: INTERNAL MEDICINE | Facility: CLINIC | Age: 85
End: 2020-09-21
Payer: MEDICARE

## 2020-09-21 VITALS
BODY MASS INDEX: 22.58 KG/M2 | TEMPERATURE: 98.1 F | OXYGEN SATURATION: 98 % | RESPIRATION RATE: 16 BRPM | HEART RATE: 78 BPM | HEIGHT: 59 IN | WEIGHT: 112 LBS | DIASTOLIC BLOOD PRESSURE: 84 MMHG | SYSTOLIC BLOOD PRESSURE: 150 MMHG

## 2020-09-21 VITALS — DIASTOLIC BLOOD PRESSURE: 80 MMHG | SYSTOLIC BLOOD PRESSURE: 134 MMHG

## 2020-09-21 PROCEDURE — 94010 BREATHING CAPACITY TEST: CPT

## 2020-09-21 PROCEDURE — 99214 OFFICE O/P EST MOD 30 MIN: CPT | Mod: 25

## 2020-09-21 RX ORDER — DOXYCYCLINE HYCLATE 100 MG/1
100 CAPSULE ORAL
Qty: 20 | Refills: 0 | Status: DISCONTINUED | COMMUNITY
Start: 2020-08-21 | End: 2020-09-21

## 2020-09-21 NOTE — REASON FOR VISIT
[Follow-Up] : a follow-up visit [Bronchiectasis] : bronchiectasis [TextBox_44] : Followup for an exacerbation of bronchiectasis and asthma

## 2020-09-21 NOTE — ASSESSMENT
[FreeTextEntry1] : Asthma and bronchiectasis had been suppressed. Hypertension is labile but welll controlled.  \par \par Spirometry reveals an FEV1 of 78% of predicted forced expiratory flow is 40%- all compatible with moderate obstructive airway disease.\par \par She will continue Flovent regularly with albuterol p.r.n.                                                                                               Recheck in 4 months.Next visit spirometry

## 2020-09-21 NOTE — HISTORY OF PRESENT ILLNESS
[Never] : never [TextBox_4] : She feels much better. No cough, wheezing or shortness of breath. She has been using Flovent, and albuterol p.r.n. The antibiotic Has been completed [TextBox_29] : She has never smoked cigarettes but has been the recipient of secondhand smoke over many years.

## 2020-09-21 NOTE — PHYSICAL EXAM
[No Acute Distress] : no acute distress [Normal Oropharynx] : normal oropharynx [Normal Appearance] : normal appearance [Supple] : supple [Normal Rate/Rhythm] : normal rate/rhythm [No Resp Distress] : no resp distress [No Acc Muscle Use] : no acc muscle use [Normal Palpation] : normal palpation [Normal Rhythm and Effort] : normal rhythm and effort [Clear to Auscultation Bilaterally] : clear to auscultation bilaterally

## 2020-09-30 ENCOUNTER — APPOINTMENT (OUTPATIENT)
Dept: INTERNAL MEDICINE | Facility: CLINIC | Age: 85
End: 2020-09-30
Payer: MEDICARE

## 2020-09-30 PROCEDURE — ZZZZZ: CPT

## 2020-10-02 ENCOUNTER — APPOINTMENT (OUTPATIENT)
Dept: UROLOGY | Facility: CLINIC | Age: 85
End: 2020-10-02
Payer: MEDICARE

## 2020-10-02 VITALS
HEIGHT: 59 IN | BODY MASS INDEX: 22.58 KG/M2 | DIASTOLIC BLOOD PRESSURE: 75 MMHG | HEART RATE: 72 BPM | WEIGHT: 112 LBS | OXYGEN SATURATION: 96 % | TEMPERATURE: 97.8 F | SYSTOLIC BLOOD PRESSURE: 195 MMHG

## 2020-10-02 DIAGNOSIS — Z84.1 FAMILY HISTORY OF DISORDERS OF KIDNEY AND URETER: ICD-10-CM

## 2020-10-02 DIAGNOSIS — Z80.7 FAMILY HISTORY OF OTHER MALIGNANT NEOPLASMS OF LYMPHOID, HEMATOPOIETIC AND RELATED TISSUES: ICD-10-CM

## 2020-10-02 DIAGNOSIS — Z63.4 DISAPPEARANCE AND DEATH OF FAMILY MEMBER: ICD-10-CM

## 2020-10-02 PROCEDURE — 99204 OFFICE O/P NEW MOD 45 MIN: CPT

## 2020-10-02 SDOH — SOCIAL STABILITY - SOCIAL INSECURITY: DISSAPEARANCE AND DEATH OF FAMILY MEMBER: Z63.4

## 2020-10-07 LAB
APPEARANCE: CLEAR
BACTERIA UR CULT: ABNORMAL
BACTERIA: NEGATIVE
BILIRUBIN URINE: NEGATIVE
BLOOD URINE: NEGATIVE
COLOR: NORMAL
GLUCOSE QUALITATIVE U: NEGATIVE
HYALINE CASTS: 0 /LPF
KETONES URINE: NEGATIVE
LEUKOCYTE ESTERASE URINE: NEGATIVE
MICROSCOPIC-UA: NORMAL
NITRITE URINE: NEGATIVE
PH URINE: 6
PROTEIN URINE: NEGATIVE
RED BLOOD CELLS URINE: 0 /HPF
SPECIFIC GRAVITY URINE: 1.01
SQUAMOUS EPITHELIAL CELLS: 1 /HPF
UROBILINOGEN URINE: NORMAL
WHITE BLOOD CELLS URINE: 1 /HPF

## 2020-10-14 ENCOUNTER — APPOINTMENT (OUTPATIENT)
Dept: INTERNAL MEDICINE | Facility: CLINIC | Age: 85
End: 2020-10-14
Payer: MEDICARE

## 2020-10-14 VITALS
WEIGHT: 111 LBS | OXYGEN SATURATION: 98 % | HEART RATE: 89 BPM | BODY MASS INDEX: 22.38 KG/M2 | SYSTOLIC BLOOD PRESSURE: 140 MMHG | HEIGHT: 59 IN | DIASTOLIC BLOOD PRESSURE: 58 MMHG | TEMPERATURE: 97.1 F

## 2020-10-14 VITALS — SYSTOLIC BLOOD PRESSURE: 130 MMHG | DIASTOLIC BLOOD PRESSURE: 50 MMHG

## 2020-10-14 DIAGNOSIS — R10.9 UNSPECIFIED ABDOMINAL PAIN: ICD-10-CM

## 2020-10-14 DIAGNOSIS — J45.909 UNSPECIFIED ASTHMA, UNCOMPLICATED: ICD-10-CM

## 2020-10-14 DIAGNOSIS — N39.0 URINARY TRACT INFECTION, SITE NOT SPECIFIED: ICD-10-CM

## 2020-10-14 DIAGNOSIS — Z87.440 PERSONAL HISTORY OF URINARY (TRACT) INFECTIONS: ICD-10-CM

## 2020-10-14 PROCEDURE — 99214 OFFICE O/P EST MOD 30 MIN: CPT | Mod: 25

## 2020-10-14 PROCEDURE — G0009: CPT

## 2020-10-14 PROCEDURE — 90732 PPSV23 VACC 2 YRS+ SUBQ/IM: CPT

## 2020-10-14 RX ORDER — SULFAMETHOXAZOLE AND TRIMETHOPRIM 800; 160 MG/1; MG/1
800-160 TABLET ORAL TWICE DAILY
Qty: 6 | Refills: 0 | Status: DISCONTINUED | COMMUNITY
Start: 2020-08-10 | End: 2020-10-14

## 2020-10-14 NOTE — PHYSICAL EXAM
[General Appearance - Well Developed] : well developed [General Appearance - Well Nourished] : well nourished [Normal Appearance] : normal appearance [Well Groomed] : well groomed [General Appearance - In No Acute Distress] : no acute distress [Edema] : no peripheral edema [Respiration, Rhythm And Depth] : normal respiratory rhythm and effort [Exaggerated Use Of Accessory Muscles For Inspiration] : no accessory muscle use [Abdomen Soft] : soft [Abdomen Tenderness] : non-tender [Costovertebral Angle Tenderness] : no ~M costovertebral angle tenderness [Urethral Meatus] : normal urethra [Urinary Bladder Findings] : the bladder was normal on palpation [External Female Genitalia] : normal external genitalia [Normal Station and Gait] : the gait and station were normal for the patient's age [] : no rash [No Focal Deficits] : no focal deficits [Oriented To Time, Place, And Person] : oriented to person, place, and time [Affect] : the affect was normal [Mood] : the mood was normal [Not Anxious] : not anxious [No Palpable Adenopathy] : no palpable adenopathy [FreeTextEntry1] : neg CST, no prolapse, vaginal atrophy

## 2020-10-14 NOTE — HEALTH RISK ASSESSMENT
[Never (0 pts)] : Never (0 points) [No] : In the past 12 months have you used drugs other than those required for medical reasons? No

## 2020-10-14 NOTE — HISTORY OF PRESENT ILLNESS
[FreeTextEntry1] : HTN [de-identified] : HTN-  compliant w meds.      She  is compliant with med. \par   Patient doesn't follow a low salt diet with diet. Ms. APARICIO  exercises -no . \par She  has no CP or SOB.\par  chol- Patient is compliant with diet and medications.  She  has  and myalgia\par \par asthma- pt was seen by pulm.  no symptoms over past 2 wk\par thickened bladder wall- I discussed w Urologist, Dr. Cobb- She has referred her to specialist Reggie Zuleta\par pt has itchy of scalp - states was prev given clobestol by Derm\par R side upper back- 2 wk-  no trauma.  worse w turning or laying down.   mild to mod dull pain. not using anything for the pain\par now improved.  no radiation of pain.  no weakness or numbness of UExt.\par no fall\par health care proxy - discussed- it is her daughter\par pt able to ADLs- lives alone\par IADLs- pt able to do it- daughter takes her to grocery store.\par GERD- doesn't use PPI daily

## 2020-10-14 NOTE — ASSESSMENT
[FreeTextEntry1] : 84 yo F with LUTS\par \par - PVR = 15ml\par - Discussed possible etiologies for LUTS. Discussed ways to manage them including behavioral modifications such as adequate hydration, controlling constipation, restricting fluids in the evening\par - encourage estrace use which had been previously prescribed by Dr. Ferrari\par - Pt interested in thirdline therapies for her LUTS.\par - FU with Dr. Reggie Zuleta\par \par Addendum: CT scan in August, 2020 shows circumferential bladder wall thickening, typical of overactive bladder in the setting of cystitis. For now, will continue to monitor.

## 2020-10-14 NOTE — PHYSICAL EXAM
[de-identified] : good ROM of spine- no pain.  tenderness R trapezius.  no t spine tenderness [de-identified] : scalp- no rash.  post neck- diffusely papular- skin colored

## 2020-10-14 NOTE — HISTORY OF PRESENT ILLNESS
[FreeTextEntry1] : 84 yo F with incontinence for the last 6-7 yrs, progressively getting worse\par Using pull-ups, requiring changes multiple times per day\par Per pt, had some sort of surgery last year with prior urologist Dr. Lata Ferrari but pt is not sure what kind of surgery. In any case, pt states it was not effective and that Dr. Ferrari suggested Botox\par In addition has tried multiple medications for her bladder for the last 2 yrs with little efficacy, per pt\par Hasn't seen her since 2020 due to Covid\par Here for second opinion\par Last UTi was in  - dysuria, lower abdominal pressure\par No UTI symptoms currently no gross hematuria\par Voids every 15-30 minutes, small volumes\par Drinks 4 bottles of water, 2 cups of herbal tea daily\par chronic constipation\par Prior history of stones - about 10 yrs ago s/p spontaneous passage\par 4 children, , not sexually active\par LMP = hysterectomy at age 37, unsure of reason

## 2020-11-03 ENCOUNTER — APPOINTMENT (OUTPATIENT)
Dept: INTERNAL MEDICINE | Facility: CLINIC | Age: 85
End: 2020-11-03
Payer: MEDICARE

## 2020-11-03 PROCEDURE — 36415 COLL VENOUS BLD VENIPUNCTURE: CPT

## 2020-11-04 LAB
ALBUMIN SERPL ELPH-MCNC: 4.4 G/DL
ALP BLD-CCNC: 70 U/L
ALT SERPL-CCNC: 9 U/L
ANION GAP SERPL CALC-SCNC: 12 MMOL/L
AST SERPL-CCNC: 16 U/L
BILIRUB SERPL-MCNC: 0.3 MG/DL
BUN SERPL-MCNC: 20 MG/DL
CALCIUM SERPL-MCNC: 9.6 MG/DL
CHLORIDE SERPL-SCNC: 102 MMOL/L
CHOLEST SERPL-MCNC: 158 MG/DL
CO2 SERPL-SCNC: 25 MMOL/L
CREAT SERPL-MCNC: 1.06 MG/DL
GLUCOSE SERPL-MCNC: 97 MG/DL
HDLC SERPL-MCNC: 49 MG/DL
LDLC SERPL CALC-MCNC: 82 MG/DL
LDLC SERPL DIRECT ASSAY-MCNC: 84 MG/DL
NONHDLC SERPL-MCNC: 109 MG/DL
POTASSIUM SERPL-SCNC: 4.8 MMOL/L
PROT SERPL-MCNC: 7 G/DL
SODIUM SERPL-SCNC: 139 MMOL/L
TRIGL SERPL-MCNC: 132 MG/DL

## 2020-11-25 DIAGNOSIS — L50.3 DERMATOGRAPHIC URTICARIA: ICD-10-CM

## 2020-12-07 ENCOUNTER — APPOINTMENT (OUTPATIENT)
Dept: INTERNAL MEDICINE | Facility: CLINIC | Age: 85
End: 2020-12-07
Payer: MEDICARE

## 2020-12-07 ENCOUNTER — APPOINTMENT (OUTPATIENT)
Dept: RADIOLOGY | Facility: CLINIC | Age: 85
End: 2020-12-07
Payer: MEDICARE

## 2020-12-07 ENCOUNTER — NON-APPOINTMENT (OUTPATIENT)
Age: 85
End: 2020-12-07

## 2020-12-07 ENCOUNTER — OUTPATIENT (OUTPATIENT)
Dept: OUTPATIENT SERVICES | Facility: HOSPITAL | Age: 85
LOS: 1 days | End: 2020-12-07
Payer: MEDICARE

## 2020-12-07 ENCOUNTER — TRANSCRIPTION ENCOUNTER (OUTPATIENT)
Age: 85
End: 2020-12-07

## 2020-12-07 VITALS
HEIGHT: 59 IN | OXYGEN SATURATION: 98 % | SYSTOLIC BLOOD PRESSURE: 160 MMHG | WEIGHT: 112 LBS | DIASTOLIC BLOOD PRESSURE: 80 MMHG | HEART RATE: 84 BPM | BODY MASS INDEX: 22.58 KG/M2

## 2020-12-07 VITALS — SYSTOLIC BLOOD PRESSURE: 150 MMHG | DIASTOLIC BLOOD PRESSURE: 70 MMHG

## 2020-12-07 DIAGNOSIS — Z23 ENCOUNTER FOR IMMUNIZATION: ICD-10-CM

## 2020-12-07 DIAGNOSIS — Z98.890 OTHER SPECIFIED POSTPROCEDURAL STATES: Chronic | ICD-10-CM

## 2020-12-07 DIAGNOSIS — J45.41 MODERATE PERSISTENT ASTHMA WITH (ACUTE) EXACERBATION: ICD-10-CM

## 2020-12-07 DIAGNOSIS — Z00.00 ENCOUNTER FOR GENERAL ADULT MEDICAL EXAMINATION W/OUT ABNORMAL FINDINGS: ICD-10-CM

## 2020-12-07 DIAGNOSIS — Z90.710 ACQUIRED ABSENCE OF BOTH CERVIX AND UTERUS: Chronic | ICD-10-CM

## 2020-12-07 DIAGNOSIS — R10.9 UNSPECIFIED ABDOMINAL PAIN: ICD-10-CM

## 2020-12-07 DIAGNOSIS — Z98.41 CATARACT EXTRACTION STATUS, RIGHT EYE: Chronic | ICD-10-CM

## 2020-12-07 DIAGNOSIS — R73.01 IMPAIRED FASTING GLUCOSE: ICD-10-CM

## 2020-12-07 DIAGNOSIS — Z00.8 ENCOUNTER FOR OTHER GENERAL EXAMINATION: ICD-10-CM

## 2020-12-07 DIAGNOSIS — J45.40 MODERATE PERSISTENT ASTHMA, UNCOMPLICATED: ICD-10-CM

## 2020-12-07 PROCEDURE — 93000 ELECTROCARDIOGRAM COMPLETE: CPT | Mod: 59

## 2020-12-07 PROCEDURE — G0439: CPT

## 2020-12-07 PROCEDURE — 99214 OFFICE O/P EST MOD 30 MIN: CPT | Mod: 25

## 2020-12-07 PROCEDURE — G0438: CPT

## 2020-12-07 PROCEDURE — 71046 X-RAY EXAM CHEST 2 VIEWS: CPT | Mod: 26

## 2020-12-07 PROCEDURE — 71046 X-RAY EXAM CHEST 2 VIEWS: CPT

## 2020-12-08 LAB
25(OH)D3 SERPL-MCNC: 49.8 NG/ML
ALBUMIN SERPL ELPH-MCNC: 4.4 G/DL
ALP BLD-CCNC: 67 U/L
ALT SERPL-CCNC: 9 U/L
ANION GAP SERPL CALC-SCNC: 13 MMOL/L
AST SERPL-CCNC: 16 U/L
BASOPHILS # BLD AUTO: 0.08 K/UL
BASOPHILS NFR BLD AUTO: 1 %
BILIRUB SERPL-MCNC: 0.3 MG/DL
BUN SERPL-MCNC: 22 MG/DL
CALCIUM SERPL-MCNC: 9.8 MG/DL
CHLORIDE SERPL-SCNC: 105 MMOL/L
CHOLEST SERPL-MCNC: 147 MG/DL
CO2 SERPL-SCNC: 23 MMOL/L
CREAT SERPL-MCNC: 1.01 MG/DL
EOSINOPHIL # BLD AUTO: 0.16 K/UL
EOSINOPHIL NFR BLD AUTO: 2 %
ESTIMATED AVERAGE GLUCOSE: 114 MG/DL
GLUCOSE SERPL-MCNC: 95 MG/DL
HBA1C MFR BLD HPLC: 5.6 %
HCT VFR BLD CALC: 43.9 %
HDLC SERPL-MCNC: 51 MG/DL
HGB BLD-MCNC: 14.1 G/DL
IMM GRANULOCYTES NFR BLD AUTO: 0.3 %
LDLC SERPL CALC-MCNC: 71 MG/DL
LDLC SERPL DIRECT ASSAY-MCNC: 76 MG/DL
LYMPHOCYTES # BLD AUTO: 1.73 K/UL
LYMPHOCYTES NFR BLD AUTO: 22.2 %
MAN DIFF?: NORMAL
MCHC RBC-ENTMCNC: 29.1 PG
MCHC RBC-ENTMCNC: 32.1 GM/DL
MCV RBC AUTO: 90.7 FL
MONOCYTES # BLD AUTO: 0.52 K/UL
MONOCYTES NFR BLD AUTO: 6.7 %
NEUTROPHILS # BLD AUTO: 5.3 K/UL
NEUTROPHILS NFR BLD AUTO: 67.8 %
NONHDLC SERPL-MCNC: 95 MG/DL
PLATELET # BLD AUTO: 195 K/UL
POTASSIUM SERPL-SCNC: 4.6 MMOL/L
PROT SERPL-MCNC: 7.3 G/DL
RBC # BLD: 4.84 M/UL
RBC # FLD: 12.9 %
SAVE SPECIMEN: NORMAL
SODIUM SERPL-SCNC: 140 MMOL/L
T3 SERPL-MCNC: 125 NG/DL
T4 FREE SERPL-MCNC: 1.2 NG/DL
TRIGL SERPL-MCNC: 124 MG/DL
TSH SERPL-ACNC: 1.69 UIU/ML
WBC # FLD AUTO: 7.81 K/UL

## 2020-12-08 NOTE — HEALTH RISK ASSESSMENT
[Good] : ~his/her~  mood as  good [Never (0 pts)] : Never (0 points) [No] : In the past 12 months have you used drugs other than those required for medical reasons? No [No falls in past year] : Patient reported no falls in the past year [0] : 1) Little interest or pleasure doing things: Not at all (0) [1] : 2) Feeling down, depressed, or hopeless for several days (1) [Patient reported bone density results were abnormal] : Patient reported bone density results were abnormal [Patient reported colonoscopy was abnormal] : Patient reported colonoscopy was abnormal [HIV test declined] : HIV test declined [Hepatitis C test declined] : Hepatitis C test declined [Change in mental status noted] : Change in mental status noted [Alone] : lives alone [Retired] : retired [] :  [Feels Safe at Home] : Feels safe at home [Fully functional (bathing, dressing, toileting, transferring, walking, feeding)] : Fully functional (bathing, dressing, toileting, transferring, walking, feeding) [Independent] : managing finances [Some assistance needed] : housekeeping [Reports changes in hearing] : Reports changes in hearing [Smoke Detector] : smoke detector [Seat Belt] :  uses seat belt [With Patient/Caregiver] : With Patient/Caregiver [Designated Healthcare Proxy] : Designated healthcare proxy [Name: ___] : Health Care Proxy's Name: [unfilled]  [Relationship: ___] : Relationship: [unfilled] [] : No [Sexually Active] : not sexually active [High Risk Behavior] : no high risk behavior [Carbon Monoxide Detector] : no carbon monoxide detector [Guns at Home] : no guns at home [Travel to Developing Areas] : does not  travel to developing areas [BoneDensityDate] : 8/19 [BoneDensityComments] : osteoporosis [ColonoscopyDate] : 08/20 [ColonoscopyComments] : diverticuli [de-identified] : mild diff w  some short term memory [FreeTextEntry4] : daughter helps w shopping [FreeTextEntry3] : has cleaning woman [de-identified] : seen Ophth- 03/20 [de-identified] : discussed need CO monitor [AdvancecareDate] : 12/20

## 2020-12-08 NOTE — HISTORY OF PRESENT ILLNESS
[FreeTextEntry1] : CPE [de-identified] : vaccine- pt refused vac today\par exercise-no\par HTN- compliant w diet, meds.  pt didn't take her BP meds today\par chol- compliant w diet, meds\par asthma/bronchietasis- improving as per pt w inhaler. no exaccerbation last wkalso being f/u by Pulm\par GERD- dec PPI.  symptoms \par chronic abd pain- pt had CT abd- thickening of bladder, colonoscopy\par pt c/o vaginal itching\par thickened bladder wall- f/u by Urology\par pt states she was told by her hepatologist told her she has a hernia

## 2020-12-08 NOTE — PHYSICAL EXAM
[No Acute Distress] : no acute distress [Well Nourished] : well nourished [Well Developed] : well developed [Well-Appearing] : well-appearing [Normal Sclera/Conjunctiva] : normal sclera/conjunctiva [PERRL] : pupils equal round and reactive to light [Normal Outer Ear/Nose] : the outer ears and nose were normal in appearance [Normal TMs] : both tympanic membranes were normal [No Lymphadenopathy] : no lymphadenopathy [Supple] : supple [Thyroid Normal, No Nodules] : the thyroid was normal and there were no nodules present [No Respiratory Distress] : no respiratory distress  [No Accessory Muscle Use] : no accessory muscle use [Clear to Auscultation] : lungs were clear to auscultation bilaterally [Normal Rate] : normal rate  [Regular Rhythm] : with a regular rhythm [Normal S1, S2] : normal S1 and S2 [No Murmur] : no murmur heard [No Carotid Bruits] : no carotid bruits [No Edema] : there was no peripheral edema [Soft] : abdomen soft [Non-distended] : non-distended [No Masses] : no abdominal mass palpated [Normal Bowel Sounds] : normal bowel sounds [Normal Supraclavicular Nodes] : no supraclavicular lymphadenopathy [Normal Axillary Nodes] : no axillary lymphadenopathy [Normal Posterior Cervical Nodes] : no posterior cervical lymphadenopathy [Normal Anterior Cervical Nodes] : no anterior cervical lymphadenopathy [Normal Inguinal Nodes] : no inguinal lymphadenopathy [Grossly Normal Strength/Tone] : grossly normal strength/tone [No Focal Deficits] : no focal deficits [Normal Gait] : normal gait [Normal Affect] : the affect was normal [Normal Insight/Judgement] : insight and judgment were intact [Declined Breast Exam] : declined breast exam  [de-identified] : mild lower abd pain tenderness

## 2020-12-08 NOTE — PLAN
[FreeTextEntry1] : EKG- NSR 77 no chg c/o 2/5/20 or 11/16/18\par elev BP- pt didn't take her BP\par bronchiectasis - Stable. Continue establish treatment plan.\par chronic abd pain- f/u w GI, GYN and sent message to Urology\par

## 2021-01-05 ENCOUNTER — APPOINTMENT (OUTPATIENT)
Dept: SURGERY | Facility: CLINIC | Age: 86
End: 2021-01-05
Payer: MEDICARE

## 2021-01-05 VITALS
DIASTOLIC BLOOD PRESSURE: 72 MMHG | BODY MASS INDEX: 22.58 KG/M2 | SYSTOLIC BLOOD PRESSURE: 146 MMHG | OXYGEN SATURATION: 99 % | RESPIRATION RATE: 15 BRPM | WEIGHT: 112 LBS | HEIGHT: 59 IN | TEMPERATURE: 97.4 F | HEART RATE: 74 BPM

## 2021-01-05 PROCEDURE — 99203 OFFICE O/P NEW LOW 30 MIN: CPT

## 2021-01-05 RX ORDER — VALACYCLOVIR 1 G/1
1 TABLET, FILM COATED ORAL
Qty: 4 | Refills: 0 | Status: DISCONTINUED | COMMUNITY
Start: 2020-11-17

## 2021-01-05 RX ORDER — FAMOTIDINE 40 MG/1
40 TABLET, FILM COATED ORAL
Qty: 60 | Refills: 0 | Status: DISCONTINUED | COMMUNITY
Start: 2020-09-11

## 2021-01-05 RX ORDER — MUPIROCIN 20 MG/G
2 OINTMENT TOPICAL
Qty: 44 | Refills: 0 | Status: DISCONTINUED | COMMUNITY
Start: 2020-12-03

## 2021-01-05 RX ORDER — LINACLOTIDE 145 UG/1
145 CAPSULE, GELATIN COATED ORAL
Qty: 30 | Refills: 3 | Status: DISCONTINUED | COMMUNITY
Start: 2020-08-18 | End: 2021-01-05

## 2021-01-05 RX ORDER — KETOCONAZOLE 20 MG/G
2 CREAM TOPICAL
Qty: 120 | Refills: 0 | Status: DISCONTINUED | COMMUNITY
Start: 2020-12-03

## 2021-01-05 RX ORDER — TRIAMCINOLONE ACETONIDE 5 MG/G
0.5 OINTMENT TOPICAL
Qty: 15 | Refills: 0 | Status: DISCONTINUED | COMMUNITY
Start: 2020-09-19

## 2021-01-05 RX ORDER — TRAZODONE HYDROCHLORIDE 50 MG/1
50 TABLET ORAL
Qty: 30 | Refills: 0 | Status: DISCONTINUED | COMMUNITY
Start: 2020-08-10 | End: 2021-01-05

## 2021-01-05 RX ORDER — PREDNISONE 20 MG/1
20 TABLET ORAL
Qty: 2 | Refills: 0 | Status: DISCONTINUED | COMMUNITY
Start: 2020-09-19

## 2021-01-05 RX ORDER — MAGNESIUM HYDROXIDE 2400 MG/30ML
2400 SUSPENSION ORAL
Qty: 1 | Refills: 0 | Status: DISCONTINUED | COMMUNITY
Start: 2020-08-12 | End: 2021-01-05

## 2021-01-05 RX ORDER — LINACLOTIDE 145 UG/1
145 CAPSULE, GELATIN COATED ORAL
Qty: 30 | Refills: 3 | Status: DISCONTINUED | COMMUNITY
Start: 2019-07-24 | End: 2021-01-05

## 2021-01-05 RX ORDER — CHOLESTYRAMINE 4 G/9G
4 POWDER, FOR SUSPENSION ORAL
Qty: 60 | Refills: 0 | Status: DISCONTINUED | COMMUNITY
Start: 2020-09-11

## 2021-01-05 RX ORDER — NITROFURANTOIN (MONOHYDRATE/MACROCRYSTALS) 25; 75 MG/1; MG/1
100 CAPSULE ORAL
Qty: 14 | Refills: 0 | Status: DISCONTINUED | COMMUNITY
Start: 2020-12-12

## 2021-01-05 RX ORDER — MOMETASONE FUROATE 1 MG/G
0.1 CREAM TOPICAL
Qty: 60 | Refills: 0 | Status: DISCONTINUED | COMMUNITY
Start: 2020-12-03

## 2021-01-05 RX ORDER — DENOSUMAB 60 MG/ML
60 INJECTION SUBCUTANEOUS
Refills: 0 | Status: DISCONTINUED | COMMUNITY
End: 2021-01-05

## 2021-01-05 RX ORDER — MICONAZOLE NITRATE 200 MG-2 %
200 & 2 KIT VAGINAL TWICE DAILY
Qty: 1 | Refills: 0 | Status: DISCONTINUED | COMMUNITY
Start: 2020-12-07 | End: 2021-01-05

## 2021-01-05 RX ORDER — DICLOFENAC SODIUM 10 MG/G
1 GEL TOPICAL DAILY
Qty: 1 | Refills: 3 | Status: DISCONTINUED | COMMUNITY
Start: 2020-10-26 | End: 2021-01-05

## 2021-01-05 RX ORDER — TERBINAFINE HCL 1 %
1 CREAM (GRAM) TOPICAL 3 TIMES DAILY
Qty: 1 | Refills: 3 | Status: DISCONTINUED | COMMUNITY
Start: 2020-08-10 | End: 2021-01-05

## 2021-01-05 NOTE — ASSESSMENT
[FreeTextEntry1] : In summary the patient has a small symptomatic reducible right inguinal hernia.  I recommended that this be electively repaired with mesh.  I explained the risks benefits and alternatives of surgery including the risk of bleeding infection and recurrence.

## 2021-01-05 NOTE — PHYSICAL EXAM
[Normal Breath Sounds] : Normal breath sounds [Normal Rate and Rhythm] : normal rate and rhythm [No HSM] : no hepatosplenomegaly [No Rash or Lesion] : No rash or lesion [Alert] : alert [Calm] : calm [de-identified] : nad [de-identified] : Well-healed abdominal plasty and left paramedian incisions.  No umbilical hernia and no left inguinal hernia.  There is a small reducible mildly tender right inguinal hernia.  There are no femoral hernias bilaterally. [de-identified] : nl

## 2021-01-05 NOTE — CONSULT LETTER
[Dear  ___] : Dear  [unfilled], [Consult Letter:] : I had the pleasure of evaluating your patient, [unfilled]. [Please see my note below.] : Please see my note below. [Consult Closing:] : Thank you very much for allowing me to participate in the care of this patient.  If you have any questions, please do not hesitate to contact me. [Sincerely,] : Sincerely, [FreeTextEntry3] : Terell Herrera M.D., F.A.C.S, F.A.S.C.R.S

## 2021-01-05 NOTE — HISTORY OF PRESENT ILLNESS
[de-identified] :  Lexis is an 86 y/o female here for consultation visit. Patient reports intermittent groin pain & feeling an inguinal bulge. The pain is worsened after walking & standing for long periods of time.  She noticed this several months ago.  It is slowly increasing in size.  She has a surgical history of a laparotomy following trauma, an appendectomy, and a remote abdominal plasty.

## 2021-01-07 ENCOUNTER — APPOINTMENT (OUTPATIENT)
Dept: OBGYN | Facility: CLINIC | Age: 86
End: 2021-01-07
Payer: MEDICARE

## 2021-01-07 VITALS
SYSTOLIC BLOOD PRESSURE: 126 MMHG | WEIGHT: 115 LBS | TEMPERATURE: 97.5 F | DIASTOLIC BLOOD PRESSURE: 70 MMHG | BODY MASS INDEX: 23.18 KG/M2 | HEIGHT: 59 IN

## 2021-01-07 DIAGNOSIS — Z01.419 ENCOUNTER FOR GYNECOLOGICAL EXAMINATION (GENERAL) (ROUTINE) W/OUT ABNORMAL FINDINGS: ICD-10-CM

## 2021-01-07 PROCEDURE — G0101: CPT

## 2021-01-07 NOTE — HISTORY OF PRESENT ILLNESS
[FreeTextEntry1] : Patient with some vaginal pain occasionally\par She is s/p hysterectomy over 40 years ago\par It was not for cancer\par  [PapSmeardate] : N/A  [TextBox_31] : hysterectomy

## 2021-01-07 NOTE — PHYSICAL EXAM
[Examination Of The Breasts] : a normal appearance [No Masses] : no breast masses were palpable [Labia Majora] : normal [Labia Minora] : normal [Normal] : normal [Atrophy] : atrophy [Absent] : absent [Uterine Adnexae] : non-palpable

## 2021-01-07 NOTE — PLAN
[FreeTextEntry1] : No gyn pathology noted\par Patient is s/p JENNIFER\par Pain may be related to hernia\par To follow up with PMD and surgeon

## 2021-01-13 ENCOUNTER — APPOINTMENT (OUTPATIENT)
Dept: GASTROENTEROLOGY | Facility: CLINIC | Age: 86
End: 2021-01-13

## 2021-02-05 ENCOUNTER — APPOINTMENT (OUTPATIENT)
Dept: INTERNAL MEDICINE | Facility: CLINIC | Age: 86
End: 2021-02-05
Payer: MEDICARE

## 2021-02-05 VITALS
WEIGHT: 115 LBS | HEIGHT: 59 IN | RESPIRATION RATE: 16 BRPM | OXYGEN SATURATION: 97 % | DIASTOLIC BLOOD PRESSURE: 70 MMHG | BODY MASS INDEX: 23.18 KG/M2 | SYSTOLIC BLOOD PRESSURE: 142 MMHG | HEART RATE: 72 BPM | TEMPERATURE: 97.3 F

## 2021-02-05 PROCEDURE — 99214 OFFICE O/P EST MOD 30 MIN: CPT

## 2021-02-05 NOTE — HISTORY OF PRESENT ILLNESS
[TextBox_4] : The patient has been enclosed in her apartment for the past 3-4 months because of the pandemic.  She has a mild usually nonproductive cough in the morning and in the evening. She has dyspnea with exertion, approximately one block. She has infrequent wheezing. She has been taking Flovent 220 2 puffs b.i.d. religiously. She has uses albuterol approximately once every 3 days. No fever, chills, or sweats. No chest pain, palpitation or edema.

## 2021-02-05 NOTE — PHYSICAL EXAM
[Well Nourished] : well nourished [Normal Oropharynx] : normal oropharynx [Normal Appearance] : normal appearance [Supple] : supple [Normal Rate/Rhythm] : normal rate/rhythm [Normal Pulses] : normal pulses [No Resp Distress] : no resp distress [No Acc Muscle Use] : no acc muscle use [Normal Rhythm and Effort] : normal rhythm and effort [Benign] : benign [No Clubbing] : no clubbing [No Edema] : no edema [TextBox_105] : Pulse oximetry 98%

## 2021-02-05 NOTE — REASON FOR VISIT
[Follow-Up] : a follow-up visit [Asthma] : asthma [Cough] : cough [COPD] : COPD [Bronchiectasis] : bronchiectasis [Shortness of Breath] : shortness of breath

## 2021-02-05 NOTE — ASSESSMENT
[FreeTextEntry1] : Pulmonary status is stable but with very little exercise tolerance. She is encouraged to obtain a new vaccine at the earliest availability.\par \par Same Rx for now.\par \par Next visit- spirometry pre-and postbronchodilator and consider chest x-ray.. A 6 minute walk would be of interest.  A covid test will be required before the visit..

## 2021-03-10 ENCOUNTER — APPOINTMENT (OUTPATIENT)
Dept: OPHTHALMOLOGY | Facility: CLINIC | Age: 86
End: 2021-03-10
Payer: MEDICARE

## 2021-03-10 ENCOUNTER — NON-APPOINTMENT (OUTPATIENT)
Age: 86
End: 2021-03-10

## 2021-03-10 PROCEDURE — 92014 COMPRE OPH EXAM EST PT 1/>: CPT

## 2021-03-10 PROCEDURE — 92134 CPTRZ OPH DX IMG PST SGM RTA: CPT

## 2021-03-12 ENCOUNTER — APPOINTMENT (OUTPATIENT)
Dept: ENDOCRINOLOGY | Facility: CLINIC | Age: 86
End: 2021-03-12
Payer: MEDICARE

## 2021-03-12 VITALS
BODY MASS INDEX: 23.99 KG/M2 | HEART RATE: 74 BPM | HEIGHT: 59 IN | TEMPERATURE: 97.8 F | OXYGEN SATURATION: 96 % | DIASTOLIC BLOOD PRESSURE: 50 MMHG | WEIGHT: 119 LBS | SYSTOLIC BLOOD PRESSURE: 122 MMHG

## 2021-03-12 PROCEDURE — ZZZZZ: CPT

## 2021-03-12 PROCEDURE — 99213 OFFICE O/P EST LOW 20 MIN: CPT | Mod: 25

## 2021-03-12 PROCEDURE — 96372 THER/PROPH/DIAG INJ SC/IM: CPT

## 2021-03-12 PROCEDURE — 77085 DXA BONE DENSITY AXL VRT FX: CPT

## 2021-03-12 RX ORDER — DENOSUMAB 60 MG/ML
60 INJECTION SUBCUTANEOUS
Qty: 1 | Refills: 0 | Status: COMPLETED | OUTPATIENT
Start: 2021-03-12

## 2021-03-12 RX ADMIN — DENOSUMAB 0 MG/ML: 60 INJECTION SUBCUTANEOUS at 00:00

## 2021-03-12 NOTE — HISTORY OF PRESENT ILLNESS
[FreeTextEntry1] : Patient is an 84 yo woman HTN, HLD, kidney stones and prediabetes establishing endocrine care for osteoporosis\par Diagnosed with osteoporosis more than 10 years ago. She was diagnosed in [place] at the time and injections were prescribed. Had three doses of prolia at the beginning but stopped. Has been off of any osteoporosis medicines since that time. Denies taking any oral bisphosphonates. Was a loss to DXA surveillance as well. Fractured right wrist requiring surgery. States she was walking in her apartment and lost balance. No hip fractures. History of rheumatoid arthritis and kidney stones. The kidney stone episode was many years ago and was treated in the ED. Has not had recent kidney stone episode\par Episode of steroid use for asthma\par \par Menopause: hysterectomy at 37 years old, unsure of why she had hysterectomy\par Takes one multivitamin\par No alcohol\par High risk fall and loss of balance; does not walk with an assisted device\par 58 year old daughter from pancreatic cancer\par Patient lives alone and cooks her meals\par Diet: fruit, vegetables, drink milk with cereal, not much dairy intake\par \par Bone density 2019\par Lumbar spine -4.2\par Femoarl neck -3.8\par \par She takes Vitamin D 5000 IU every other day.  She eat cheese and yogurt.

## 2021-03-12 NOTE — PHYSICAL EXAM
[Well Nourished] : well nourished [No Proptosis] : no proptosis [No Neck Mass] : no neck mass was observed [No Respiratory Distress] : no respiratory distress [No Accessory Muscle Use] : no accessory muscle use [Clear to Auscultation] : lungs were clear to auscultation bilaterally [Cranial Nerves Intact] : cranial nerves 2-12 were intact [No Tremors] : no tremors [Oriented x3] : oriented to person, place, and time [Normal Affect] : the affect was normal [Normal Insight/Judgement] : insight and judgment were intact [Normal Mood] : the mood was normal

## 2021-03-12 NOTE — ASSESSMENT
[FreeTextEntry1] : Patient is an 86 yo woman with osteoporosis, prediabetes, HTN and HLD\par \par 1. Osteoporosis\par -patient has severe osteoporosis with hx of wrist fracture last year. She is at high risk for fractures based on poor balance, rheumatoid history as well\par -she had deferred treatment for many years and is willing to start\par -recommend Prolia for her for fracture risk reduction. Hypocalcemia, ONJ/AFF side effects were discussed. No plans for major dental work\par -She's is working with primary care doctor, getting MRI for her neck and seeing physical therapy, she's following up with them.  \par -fall precautions discussed\par -check vitamin D levels\par -Prolia given today.  \par \par 2. Prediabetes\par -A1C in Dec 2020 is 5.6%\par -Continue with lifestyle changes\par \par 3. HLD\par -LDL is 84 mg/dl in Nov 2020\par -Continue with statin therapy. \par \par 4. HTN\par -BP goal < 140/90. \par \par \par Risks and benefits of denosumab therapy were discussed with the patient including eczema, cellulitis, osteonecrosis of the jaw and atypical femur fractures. \par

## 2021-03-17 LAB
25(OH)D3 SERPL-MCNC: 58.2 NG/ML
ALBUMIN SERPL ELPH-MCNC: 4.2 G/DL
ALP BLD-CCNC: 71 U/L
ALT SERPL-CCNC: 10 U/L
ANION GAP SERPL CALC-SCNC: 10 MMOL/L
AST SERPL-CCNC: 13 U/L
BILIRUB SERPL-MCNC: 0.3 MG/DL
BUN SERPL-MCNC: 13 MG/DL
CALCIUM SERPL-MCNC: 9.6 MG/DL
CHLORIDE SERPL-SCNC: 103 MMOL/L
CO2 SERPL-SCNC: 27 MMOL/L
CREAT SERPL-MCNC: 0.99 MG/DL
GLUCOSE SERPL-MCNC: 82 MG/DL
POTASSIUM SERPL-SCNC: 4.7 MMOL/L
PROT SERPL-MCNC: 6.7 G/DL
SODIUM SERPL-SCNC: 140 MMOL/L

## 2021-05-17 ENCOUNTER — APPOINTMENT (OUTPATIENT)
Dept: INTERNAL MEDICINE | Facility: CLINIC | Age: 86
End: 2021-05-17
Payer: MEDICARE

## 2021-05-17 VITALS
TEMPERATURE: 96.9 F | BODY MASS INDEX: 23.99 KG/M2 | HEART RATE: 84 BPM | OXYGEN SATURATION: 97 % | DIASTOLIC BLOOD PRESSURE: 60 MMHG | WEIGHT: 119 LBS | SYSTOLIC BLOOD PRESSURE: 116 MMHG | HEIGHT: 59 IN

## 2021-05-17 DIAGNOSIS — Z87.898 PERSONAL HISTORY OF OTHER SPECIFIED CONDITIONS: ICD-10-CM

## 2021-05-17 DIAGNOSIS — J47.1 BRONCHIECTASIS WITH (ACUTE) EXACERBATION: ICD-10-CM

## 2021-05-17 PROCEDURE — 99214 OFFICE O/P EST MOD 30 MIN: CPT | Mod: 25

## 2021-05-17 PROCEDURE — 36415 COLL VENOUS BLD VENIPUNCTURE: CPT

## 2021-05-17 NOTE — PLAN
[FreeTextEntry1] : f/u for pre op, pt also to see Cardio for cardiac clearance\par pt has appt for GI 5/26/21\par pt understands alprazolam- 30 tab/ 6 mo

## 2021-05-17 NOTE — HISTORY OF PRESENT ILLNESS
[FreeTextEntry1] : HTN, lipid, abd pain [de-identified] : \par HTN- compliant w diet, meds.  pt didn't take her BP meds today. 3/12/21 CMP - nl.  has hx of abd pain after meals- reviewed GI notes- 09/10/19- pt states pain has been worse- 3 wk- more severe. pt states she has been vomiting-daily. no diarrhea, constipation, fever.  abd pain is across the upper abd- \par chol, osteoporosis- compliant w diet, meds.  reviewed Endo notes- getting Prolia\par asthma/bronchietasis- reviewed  Pulm notes2/5/21- has f/u 6/2021- pt going to have PFT, CXR\par GERD- dec PPI.  symptoms \par chronic abd pain- pt had CT abd- thickening of bladder, colonoscopy. \par  reviewed surgery 1/5/21 notes- inguinal hernia.  reviewed GYN notes 1/7/21- no GYN issues, the pain she believes is from the hernia\par states doesn't tolerate trazodone. states she wants alprazolam instead- states she only uses it 2-3 / mo- when she is very stressed and can't sleep\par thickened bladder wall- f/u by Urology\par pt states she was told by her hepatologist told her she has a hernia

## 2021-05-18 LAB
ALBUMIN SERPL ELPH-MCNC: 4.1 G/DL
ALP BLD-CCNC: 67 U/L
ALT SERPL-CCNC: 15 U/L
ANION GAP SERPL CALC-SCNC: 10 MMOL/L
AST SERPL-CCNC: 22 U/L
BASOPHILS # BLD AUTO: 0.08 K/UL
BASOPHILS NFR BLD AUTO: 1 %
BILIRUB SERPL-MCNC: 0.2 MG/DL
BUN SERPL-MCNC: 21 MG/DL
CALCIUM SERPL-MCNC: 10.1 MG/DL
CHLORIDE SERPL-SCNC: 103 MMOL/L
CO2 SERPL-SCNC: 25 MMOL/L
CREAT SERPL-MCNC: 0.88 MG/DL
EOSINOPHIL # BLD AUTO: 0.25 K/UL
EOSINOPHIL NFR BLD AUTO: 3.2 %
GLUCOSE SERPL-MCNC: 89 MG/DL
HCT VFR BLD CALC: 44.1 %
HGB BLD-MCNC: 14.1 G/DL
IMM GRANULOCYTES NFR BLD AUTO: 0.3 %
LYMPHOCYTES # BLD AUTO: 1.61 K/UL
LYMPHOCYTES NFR BLD AUTO: 20.4 %
MAN DIFF?: NORMAL
MCHC RBC-ENTMCNC: 29.1 PG
MCHC RBC-ENTMCNC: 32 GM/DL
MCV RBC AUTO: 91.1 FL
MONOCYTES # BLD AUTO: 0.7 K/UL
MONOCYTES NFR BLD AUTO: 8.9 %
NEUTROPHILS # BLD AUTO: 5.22 K/UL
NEUTROPHILS NFR BLD AUTO: 66.2 %
PLATELET # BLD AUTO: 188 K/UL
POTASSIUM SERPL-SCNC: 4.9 MMOL/L
PROT SERPL-MCNC: 7.2 G/DL
RBC # BLD: 4.84 M/UL
RBC # FLD: 12.7 %
SAVE SPECIMEN: NORMAL
SODIUM SERPL-SCNC: 138 MMOL/L
WBC # FLD AUTO: 7.88 K/UL

## 2021-05-28 ENCOUNTER — APPOINTMENT (OUTPATIENT)
Dept: DISASTER EMERGENCY | Facility: CLINIC | Age: 86
End: 2021-05-28

## 2021-06-03 ENCOUNTER — APPOINTMENT (OUTPATIENT)
Dept: INTERNAL MEDICINE | Facility: CLINIC | Age: 86
End: 2021-06-03

## 2021-06-04 ENCOUNTER — APPOINTMENT (OUTPATIENT)
Dept: DISASTER EMERGENCY | Facility: CLINIC | Age: 86
End: 2021-06-04

## 2021-06-05 LAB — SARS-COV-2 N GENE NPH QL NAA+PROBE: NOT DETECTED

## 2021-06-07 ENCOUNTER — APPOINTMENT (OUTPATIENT)
Dept: INTERNAL MEDICINE | Facility: CLINIC | Age: 86
End: 2021-06-07
Payer: MEDICARE

## 2021-06-07 VITALS
WEIGHT: 114 LBS | TEMPERATURE: 96.8 F | OXYGEN SATURATION: 94 % | HEART RATE: 76 BPM | HEIGHT: 59 IN | SYSTOLIC BLOOD PRESSURE: 132 MMHG | DIASTOLIC BLOOD PRESSURE: 70 MMHG | BODY MASS INDEX: 22.98 KG/M2

## 2021-06-07 PROCEDURE — 94010 BREATHING CAPACITY TEST: CPT

## 2021-06-07 PROCEDURE — 99214 OFFICE O/P EST MOD 30 MIN: CPT | Mod: 25

## 2021-06-07 NOTE — PHYSICAL EXAM
[No Acute Distress] : no acute distress [Well Nourished] : well nourished [Normal Oropharynx] : normal oropharynx [Normal Appearance] : normal appearance [Supple] : supple [Normal Rate/Rhythm] : normal rate/rhythm [Normal Pulses] : normal pulses [No Resp Distress] : no resp distress [No Acc Muscle Use] : no acc muscle use [Normal Rhythm and Effort] : normal rhythm and effort [Benign] : benign [No Clubbing] : no clubbing [No Edema] : no edema [TextBox_105] : Pulse oximetry 98%

## 2021-06-07 NOTE — HISTORY OF PRESENT ILLNESS
[TextBox_4] : Respiratory status has been well controlled. No cough or wheezing.Usual dyspnea with exertion.

## 2021-06-07 NOTE — REASON FOR VISIT
[Follow-Up] : a follow-up visit [Asthma] : asthma [COPD] : COPD [Bronchiectasis] : bronchiectasis [TextBox_44] : Also followed for hypertension.  Also the patient has a right inguinal hernia. She is awaiting formal cardiac clearance, although the workup appears to be satisfactory.

## 2021-06-07 NOTE — ASSESSMENT
[FreeTextEntry1] : Covid swab is negative.  Spirometry reveals an FEV1 of 84% of predicted. FVC is 99%and FEV1/FVC is 85%. FEF 25-75 is 50%-compatible with small calibar bronchial disease.  There is no pulmonary contraindication to the planned inguinal hernia repair.\par \par Her respiratory status has been well controlled on the present regimen. Same Rx.\par \par Next is fasting blood work, EKG.

## 2021-06-09 ENCOUNTER — APPOINTMENT (OUTPATIENT)
Dept: SURGERY | Facility: HOSPITAL | Age: 86
End: 2021-06-09

## 2021-06-14 ENCOUNTER — APPOINTMENT (OUTPATIENT)
Dept: GASTROENTEROLOGY | Facility: CLINIC | Age: 86
End: 2021-06-14
Payer: MEDICARE

## 2021-06-14 VITALS
DIASTOLIC BLOOD PRESSURE: 60 MMHG | BODY MASS INDEX: 23.79 KG/M2 | OXYGEN SATURATION: 96 % | HEIGHT: 59 IN | SYSTOLIC BLOOD PRESSURE: 122 MMHG | HEART RATE: 89 BPM | WEIGHT: 118 LBS | TEMPERATURE: 97 F

## 2021-06-14 PROCEDURE — 99214 OFFICE O/P EST MOD 30 MIN: CPT

## 2021-06-14 NOTE — ASSESSMENT
[FreeTextEntry1] : 1. chronic right sided abdominal pain for at one year, currently worse with food, not resolved with ppi,normal lfts\par \par plan abdominal us r/o gallstones\par \par 2. chronic constipation, ct colonography in 2019 unrevealing\par \par \par plan recommend miralax daily pt does not want to take

## 2021-06-14 NOTE — REVIEW OF SYSTEMS
[Fever] : no fever [Eyesight Problems] : no eyesight problems [Discharge From Eyes] : no purulent discharge from the eyes [Nosebleeds] : no nosebleeds [Nasal Discharge] : no nasal discharge [Chest Pain] : no chest pain [Pelvic Pain] : no pelvic pain [Dysmenorrhea] : no dysmenorrhea

## 2021-06-14 NOTE — PHYSICAL EXAM
[General Appearance - Alert] : alert [General Appearance - In No Acute Distress] : in no acute distress [General Appearance - Well Nourished] : well nourished [General Appearance - Well Developed] : well developed [Sclera] : the sclera and conjunctiva were normal [Auscultation Breath Sounds / Voice Sounds] : lungs were clear to auscultation bilaterally [Heart Sounds] : normal S1 and S2 [Bowel Sounds] : normal bowel sounds [Abdomen Soft] : soft [Abdomen Tenderness] : non-tender [No CVA Tenderness] : no ~M costovertebral angle tenderness [Nail Clubbing] : no clubbing  or cyanosis of the fingernails [Abnormal Walk] : normal gait [] : no rash [Skin Lesions] : no skin lesions [No Focal Deficits] : no focal deficits [Oriented To Time, Place, And Person] : oriented to person, place, and time

## 2021-06-14 NOTE — HISTORY OF PRESENT ILLNESS
[FreeTextEntry1] : 87 yo female with c/o right sided abdominal pain, s/p egd reveals gastritis hiatal hernia, 2020, ct colonography in 2019 unrevealing. patient  awaiting inguinal hernia surgery. Patient reports pain almost daily ,sharp, 7/10 pain scale, lasts for hours, soon after eating.did not take otc medicine, patn better with bm.\par \par

## 2021-06-18 ENCOUNTER — APPOINTMENT (OUTPATIENT)
Dept: ULTRASOUND IMAGING | Facility: CLINIC | Age: 86
End: 2021-06-18

## 2021-06-25 ENCOUNTER — OUTPATIENT (OUTPATIENT)
Dept: OUTPATIENT SERVICES | Facility: HOSPITAL | Age: 86
LOS: 1 days | End: 2021-06-25

## 2021-06-25 ENCOUNTER — OUTPATIENT (OUTPATIENT)
Dept: OUTPATIENT SERVICES | Facility: HOSPITAL | Age: 86
LOS: 1 days | End: 2021-06-25
Payer: MEDICARE

## 2021-06-25 ENCOUNTER — APPOINTMENT (OUTPATIENT)
Dept: ULTRASOUND IMAGING | Facility: CLINIC | Age: 86
End: 2021-06-25
Payer: MEDICARE

## 2021-06-25 VITALS
OXYGEN SATURATION: 98 % | HEART RATE: 73 BPM | DIASTOLIC BLOOD PRESSURE: 84 MMHG | WEIGHT: 113.98 LBS | RESPIRATION RATE: 14 BRPM | SYSTOLIC BLOOD PRESSURE: 156 MMHG | HEIGHT: 59 IN | TEMPERATURE: 98 F

## 2021-06-25 DIAGNOSIS — T14.8XXA OTHER INJURY OF UNSPECIFIED BODY REGION, INITIAL ENCOUNTER: Chronic | ICD-10-CM

## 2021-06-25 DIAGNOSIS — K40.90 UNILATERAL INGUINAL HERNIA, WITHOUT OBSTRUCTION OR GANGRENE, NOT SPECIFIED AS RECURRENT: ICD-10-CM

## 2021-06-25 DIAGNOSIS — Z90.710 ACQUIRED ABSENCE OF BOTH CERVIX AND UTERUS: Chronic | ICD-10-CM

## 2021-06-25 DIAGNOSIS — Z98.41 CATARACT EXTRACTION STATUS, RIGHT EYE: Chronic | ICD-10-CM

## 2021-06-25 DIAGNOSIS — Z98.890 OTHER SPECIFIED POSTPROCEDURAL STATES: Chronic | ICD-10-CM

## 2021-06-25 DIAGNOSIS — R19.07 GENERALIZED INTRA-ABDOMINAL AND PELVIC SWELLING, MASS AND LUMP: ICD-10-CM

## 2021-06-25 DIAGNOSIS — Z90.49 ACQUIRED ABSENCE OF OTHER SPECIFIED PARTS OF DIGESTIVE TRACT: Chronic | ICD-10-CM

## 2021-06-25 DIAGNOSIS — Z01.818 ENCOUNTER FOR OTHER PREPROCEDURAL EXAMINATION: ICD-10-CM

## 2021-06-25 LAB
ANION GAP SERPL CALC-SCNC: 12 MMOL/L — SIGNIFICANT CHANGE UP (ref 5–17)
BUN SERPL-MCNC: 23 MG/DL — SIGNIFICANT CHANGE UP (ref 7–23)
CALCIUM SERPL-MCNC: 10.1 MG/DL — SIGNIFICANT CHANGE UP (ref 8.4–10.5)
CHLORIDE SERPL-SCNC: 99 MMOL/L — SIGNIFICANT CHANGE UP (ref 96–108)
CO2 SERPL-SCNC: 25 MMOL/L — SIGNIFICANT CHANGE UP (ref 22–31)
CREAT SERPL-MCNC: 0.96 MG/DL — SIGNIFICANT CHANGE UP (ref 0.5–1.3)
GLUCOSE SERPL-MCNC: 89 MG/DL — SIGNIFICANT CHANGE UP (ref 70–99)
HCT VFR BLD CALC: 42.1 % — SIGNIFICANT CHANGE UP (ref 34.5–45)
HGB BLD-MCNC: 13.5 G/DL — SIGNIFICANT CHANGE UP (ref 11.5–15.5)
MCHC RBC-ENTMCNC: 28.6 PG — SIGNIFICANT CHANGE UP (ref 27–34)
MCHC RBC-ENTMCNC: 32.1 GM/DL — SIGNIFICANT CHANGE UP (ref 32–36)
MCV RBC AUTO: 89.2 FL — SIGNIFICANT CHANGE UP (ref 80–100)
NRBC # BLD: 0 /100 WBCS — SIGNIFICANT CHANGE UP (ref 0–0)
PLATELET # BLD AUTO: 203 K/UL — SIGNIFICANT CHANGE UP (ref 150–400)
POTASSIUM SERPL-MCNC: 4.5 MMOL/L — SIGNIFICANT CHANGE UP (ref 3.5–5.3)
POTASSIUM SERPL-SCNC: 4.5 MMOL/L — SIGNIFICANT CHANGE UP (ref 3.5–5.3)
RBC # BLD: 4.72 M/UL — SIGNIFICANT CHANGE UP (ref 3.8–5.2)
RBC # FLD: 12.6 % — SIGNIFICANT CHANGE UP (ref 10.3–14.5)
SODIUM SERPL-SCNC: 136 MMOL/L — SIGNIFICANT CHANGE UP (ref 135–145)
WBC # BLD: 8.78 K/UL — SIGNIFICANT CHANGE UP (ref 3.8–10.5)
WBC # FLD AUTO: 8.78 K/UL — SIGNIFICANT CHANGE UP (ref 3.8–10.5)

## 2021-06-25 PROCEDURE — 80048 BASIC METABOLIC PNL TOTAL CA: CPT

## 2021-06-25 PROCEDURE — 76700 US EXAM ABDOM COMPLETE: CPT

## 2021-06-25 PROCEDURE — 85027 COMPLETE CBC AUTOMATED: CPT

## 2021-06-25 PROCEDURE — G0463: CPT

## 2021-06-25 PROCEDURE — 76700 US EXAM ABDOM COMPLETE: CPT | Mod: 26

## 2021-06-25 RX ORDER — CEFAZOLIN SODIUM 1 G
2000 VIAL (EA) INJECTION ONCE
Refills: 0 | Status: DISCONTINUED | OUTPATIENT
Start: 2021-08-12 | End: 2021-08-27

## 2021-06-25 RX ORDER — LOSARTAN POTASSIUM 100 MG/1
1 TABLET, FILM COATED ORAL
Qty: 0 | Refills: 0 | DISCHARGE

## 2021-06-25 RX ORDER — LINACLOTIDE 145 UG/1
1 CAPSULE, GELATIN COATED ORAL
Qty: 0 | Refills: 0 | DISCHARGE

## 2021-06-25 RX ORDER — CHLORHEXIDINE GLUCONATE 213 G/1000ML
1 SOLUTION TOPICAL ONCE
Refills: 0 | Status: DISCONTINUED | OUTPATIENT
Start: 2021-08-12 | End: 2021-08-27

## 2021-06-25 RX ORDER — SODIUM CHLORIDE 9 MG/ML
3 INJECTION INTRAMUSCULAR; INTRAVENOUS; SUBCUTANEOUS EVERY 8 HOURS
Refills: 0 | Status: DISCONTINUED | OUTPATIENT
Start: 2021-08-12 | End: 2021-08-27

## 2021-06-25 RX ORDER — LIDOCAINE HCL 20 MG/ML
0.2 VIAL (ML) INJECTION ONCE
Refills: 0 | Status: DISCONTINUED | OUTPATIENT
Start: 2021-08-12 | End: 2021-08-27

## 2021-06-25 NOTE — H&P PST ADULT - MUSCULOSKELETAL
detailed exam no joint erythema/no joint warmth/no calf tenderness/normal strength/decreased ROM due to pain details… normal/no joint erythema/no joint warmth/no calf tenderness/normal strength/decreased ROM due to pain

## 2021-06-25 NOTE — H&P PST ADULT - NEGATIVE GENERAL SYMPTOMS
no fever/no chills/no sweating/no anorexia/no weight loss/no weight gain/no fatigue no fever/no chills/no sweating/no anorexia/no weight loss/no weight gain/no malaise/no fatigue

## 2021-06-25 NOTE — H&P PST ADULT - NSICDXPROBLEM_GEN_ALL_CORE_FT
PROBLEM DIAGNOSES  Problem: Unilateral inguinal hernia without obstruction or gangrene  Assessment and Plan: -Scheduled for right inguinal hernia repair 7/8/21 with Dr. Herrera  -CBC & BMP today  -COVID Vaccine Card on file (Phizer 3/3/21 and 3/21/21)  -PCP clearanc scheduled for 7/2/21 with Dr. Johnson (to review EKG, Echo, and Nuclear stress at that time)  -Preop instructions provided and both patient and daughter (Renetta) stated understanding   -Surgical scrub provided, along with directions for use  -Patient advised to take losartan omeprazole, and carvedilol DOS in am with sip of water.  -Ample time was provided for patient and daughter to ask questions and have them answered.     Problem: Right inguinal hernia  Assessment and Plan:

## 2021-06-25 NOTE — H&P PST ADULT - GASTROINTESTINAL DETAILS
soft/nontender/no distention/bowel sounds normal/no rebound tenderness soft/nontender/no distention/bowel sounds normal/no guarding

## 2021-06-25 NOTE — H&P PST ADULT - NSICDXPASTMEDICALHX_GEN_ALL_CORE_FT
PAST MEDICAL HISTORY:  Cataract left eye    Displaced fracture of unspecified radial styloid process, initial encounter for closed fracture     GERD (gastroesophageal reflux disease)     H/O urinary incontinence treated with botox    Hepatitis C treated 2 years ago    Hyperlipidemia     Hypertension     Lumbar radiculopathy     Macular degeneration     Osteoarthritis

## 2021-06-25 NOTE — H&P PST ADULT - NEGATIVE ENMT SYMPTOMS
no hearing difficulty/no ear pain/no tinnitus/no vertigo/no sinus symptoms/no nasal congestion/no nose bleeds/no throat pain/no dysphagia

## 2021-06-25 NOTE — H&P PST ADULT - HISTORY OF PRESENT ILLNESS
86 year old female presents to presurgical testing  86 year old female presents to presurgical testing with PMH significant for hypertension, macular degeneration, hyperlipidemia, hepatitis C (treated 2 years ago), gerd, urinary incontinence, and cataracts.  She is scheduled for right inguinal hernia repair on 7/8/2021 with Dr. Terell Herrera.  She states that she has intermittent pain in her right pelvic region.  She denies recent known COVID exposure, fever, chills, n/v, diarrhea, abdominal pain, sore throat, headaches, SOB, chest pain, palpitations, rhinnorhea, nasal congestion, headaches and change in taste/smell.    COVID Vaccine Card on file (Phizer 3/3/21 and 3/21/21)  PCP clearance with Dr. Johnson scheduled for 7/2/21 (EKG, Echo, and recent stress to be reviewed at that time)
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
## 2021-06-25 NOTE — H&P PST ADULT - BLOOD AVOIDANCE/RESTRICTIONS, PROFILE
Assessment:    Healthy 1 y o  female child  1  Encounter for routine child health examination with abnormal findings     2  Body mass index, pediatric, 85th percentile to less than 95th percentile for age     1  Exercise counseling     4  Nutritional counseling     5  Encounter for immunization  influenza vaccine, quadrivalent, 0 5 mL, preservative-free, for adult and pediatric patients 6 mos+ (AFLURIA, [address], FLULAVAL, FLUZONE)         Plan:          1  Anticipatory guidance discussed  Gave handout on well-child issues at this age  Nutrition and Exercise Counseling: The patient's Body mass index is 18 12 kg/m²  This is 94 %ile (Z= 1 58) based on CDC (Girls, 2-20 Years) BMI-for-age based on BMI available as of 2/8/2021  Nutrition counseling provided:  Reviewed long term health goals and risks of obesity  Avoid juice/sugary drinks  5 servings of fruits/vegetables  Exercise counseling provided:  Anticipatory guidance and counseling on exercise and physical activity given  Reduce screen time to less than 2 hours per day  1 hour of aerobic exercise daily  2  Development: appropriate for age    1  Immunizations today: per orders  Discussed with: mother  The benefits, contraindication and side effects for the following vaccines were reviewed: influenza  Total number of components reveiwed: 1    4  Follow-up visit in 1 year for next well child visit, or sooner as needed  Subjective: Daya Rodriguez is a 1 y o  female who is brought in for this well child visit  Current Issues:  Current concerns include check vaginal adhesions  Well Child Assessment:  History was provided by the mother  Cushing lives with her mother and sister  Nutrition  Types of intake include fruits and meats (Eats well, not great with veggies)  Dental  The patient has a dental home  Elimination  Elimination problems do not include constipation, diarrhea or urinary symptoms  Toilet training is complete  Behavioral  Disciplinary methods include consistency among caregivers, ignoring tantrums and praising good behavior  Sleep  The patient sleeps in her own bed  Average sleep duration is 8 hours  The patient does not snore  There are no sleep problems  Safety  Home is child-proofed? yes  There is no smoking in the home  Home has working smoke alarms? yes  Home has working carbon monoxide alarms? yes  There is a gun in home  There is an appropriate car seat in use  Screening  Immunizations are up-to-date  There are no risk factors for hearing loss  There are no risk factors for anemia  There are no risk factors for tuberculosis  There are no risk factors for lead toxicity  Social  The caregiver enjoys the child  Childcare is provided at   The childcare provider is a  provider  The child spends 4 days per week at   Sibling interactions are good         The following portions of the patient's history were reviewed and updated as appropriate: allergies, current medications, past family history, past medical history, past social history, past surgical history and problem list     Developmental 3 Years Appropriate     Question Response Comments    Child can stack 4 small (< 2") blocks without them falling Yes Yes on 2/8/2021 (Age - 3yrs)    Speaks in 2-word sentences Yes Yes on 2/8/2021 (Age - 3yrs)    Can identify at least 2 of pictures of cat, bird, horse, dog, person Yes Yes on 2/8/2021 (Age - 3yrs)    Throws ball overhand, straight, toward parent's stomach or chest from a distance of 5 feet Yes Yes on 2/8/2021 (Age - 3yrs)    Adequately follows instructions: 'put the paper on the floor; put the paper on the chair; give the paper to me' Yes Yes on 2/8/2021 (Age - 3yrs)    Copies a drawing of a straight vertical line No No on 2/8/2021 (Age - 3yrs)    Can jump over paper placed on floor (no running jump) Yes Yes on 2/8/2021 (Age - 3yrs)    Can put on own shoes Yes Yes on 2/8/2021 (Age - 3yrs) Can pedal a tricycle at least 10 feet Yes Yes on 2/8/2021 (Age - 3yrs)                Objective:      Growth parameters are noted and are appropriate for age  Wt Readings from Last 1 Encounters:   02/08/21 15 8 kg (34 lb 12 8 oz) (83 %, Z= 0 96)*     * Growth percentiles are based on CDC (Girls, 2-20 Years) data  Ht Readings from Last 1 Encounters:   02/08/21 3' 0 75" (0 933 m) (41 %, Z= -0 24)*     * Growth percentiles are based on CDC (Girls, 2-20 Years) data  Body mass index is 18 12 kg/m²  Vitals:    02/08/21 0917   BP: (!) 92/58   Pulse: (!) 68   Temp: 98 1 °F (36 7 °C)   SpO2: 98%   Weight: 15 8 kg (34 lb 12 8 oz)   Height: 3' 0 75" (0 933 m)       Physical Exam  Vitals signs and nursing note reviewed  Constitutional:       General: She is active  Appearance: Normal appearance  She is well-developed  HENT:      Head: Normocephalic  Right Ear: Tympanic membrane, ear canal and external ear normal       Left Ear: Tympanic membrane, ear canal and external ear normal       Nose: Nose normal       Mouth/Throat:      Mouth: Mucous membranes are moist       Pharynx: Oropharynx is clear  Eyes:      General: Red reflex is present bilaterally  Extraocular Movements: Extraocular movements intact  Conjunctiva/sclera: Conjunctivae normal       Pupils: Pupils are equal, round, and reactive to light  Neck:      Musculoskeletal: Normal range of motion and neck supple  Cardiovascular:      Rate and Rhythm: Normal rate and regular rhythm  Pulses: Normal pulses  Heart sounds: Normal heart sounds  Pulmonary:      Effort: Pulmonary effort is normal       Breath sounds: Normal breath sounds  Abdominal:      General: Bowel sounds are normal  There is no distension  Palpations: Abdomen is soft  There is no mass  Tenderness: There is no abdominal tenderness  Hernia: No hernia is present  Genitourinary:     General: Normal vulva     Musculoskeletal: Normal range of motion  Skin:     General: Skin is warm  Capillary Refill: Capillary refill takes less than 2 seconds  Neurological:      General: No focal deficit present  Mental Status: She is alert and oriented for age  none

## 2021-06-27 ENCOUNTER — EMERGENCY (EMERGENCY)
Facility: HOSPITAL | Age: 86
LOS: 1 days | Discharge: HOME | End: 2021-06-27
Attending: STUDENT IN AN ORGANIZED HEALTH CARE EDUCATION/TRAINING PROGRAM
Payer: MEDICARE

## 2021-06-27 VITALS
HEART RATE: 84 BPM | TEMPERATURE: 98 F | OXYGEN SATURATION: 97 % | RESPIRATION RATE: 16 BRPM | SYSTOLIC BLOOD PRESSURE: 184 MMHG | DIASTOLIC BLOOD PRESSURE: 72 MMHG

## 2021-06-27 VITALS
HEART RATE: 80 BPM | RESPIRATION RATE: 18 BRPM | SYSTOLIC BLOOD PRESSURE: 162 MMHG | TEMPERATURE: 98 F | HEIGHT: 59 IN | WEIGHT: 113.98 LBS | OXYGEN SATURATION: 97 % | DIASTOLIC BLOOD PRESSURE: 61 MMHG

## 2021-06-27 DIAGNOSIS — T14.8XXA OTHER INJURY OF UNSPECIFIED BODY REGION, INITIAL ENCOUNTER: Chronic | ICD-10-CM

## 2021-06-27 DIAGNOSIS — Z90.49 ACQUIRED ABSENCE OF OTHER SPECIFIED PARTS OF DIGESTIVE TRACT: Chronic | ICD-10-CM

## 2021-06-27 DIAGNOSIS — Z98.41 CATARACT EXTRACTION STATUS, RIGHT EYE: Chronic | ICD-10-CM

## 2021-06-27 DIAGNOSIS — Z90.710 ACQUIRED ABSENCE OF BOTH CERVIX AND UTERUS: Chronic | ICD-10-CM

## 2021-06-27 DIAGNOSIS — Z98.890 OTHER SPECIFIED POSTPROCEDURAL STATES: Chronic | ICD-10-CM

## 2021-06-27 LAB
ALBUMIN SERPL ELPH-MCNC: 4.4 G/DL — SIGNIFICANT CHANGE UP (ref 3.3–5)
ALP SERPL-CCNC: 68 U/L — SIGNIFICANT CHANGE UP (ref 40–120)
ALT FLD-CCNC: 13 U/L — SIGNIFICANT CHANGE UP (ref 10–45)
ANION GAP SERPL CALC-SCNC: 14 MMOL/L — SIGNIFICANT CHANGE UP (ref 5–17)
APPEARANCE UR: CLEAR — SIGNIFICANT CHANGE UP
AST SERPL-CCNC: 16 U/L — SIGNIFICANT CHANGE UP (ref 10–40)
BASE EXCESS BLDV CALC-SCNC: 1.9 MMOL/L — SIGNIFICANT CHANGE UP (ref -2–2)
BASOPHILS # BLD AUTO: 0.08 K/UL — SIGNIFICANT CHANGE UP (ref 0–0.2)
BASOPHILS NFR BLD AUTO: 0.9 % — SIGNIFICANT CHANGE UP (ref 0–2)
BILIRUB SERPL-MCNC: 0.3 MG/DL — SIGNIFICANT CHANGE UP (ref 0.2–1.2)
BILIRUB UR-MCNC: NEGATIVE — SIGNIFICANT CHANGE UP
BUN SERPL-MCNC: 18 MG/DL — SIGNIFICANT CHANGE UP (ref 7–23)
CA-I SERPL-SCNC: 1.16 MMOL/L — SIGNIFICANT CHANGE UP (ref 1.12–1.3)
CALCIUM SERPL-MCNC: 9.5 MG/DL — SIGNIFICANT CHANGE UP (ref 8.4–10.5)
CHLORIDE BLDV-SCNC: 106 MMOL/L — SIGNIFICANT CHANGE UP (ref 96–108)
CHLORIDE SERPL-SCNC: 101 MMOL/L — SIGNIFICANT CHANGE UP (ref 96–108)
CO2 BLDV-SCNC: 28 MMOL/L — SIGNIFICANT CHANGE UP (ref 22–30)
CO2 SERPL-SCNC: 22 MMOL/L — SIGNIFICANT CHANGE UP (ref 22–31)
COLOR SPEC: SIGNIFICANT CHANGE UP
CREAT SERPL-MCNC: 1.13 MG/DL — SIGNIFICANT CHANGE UP (ref 0.5–1.3)
DIFF PNL FLD: NEGATIVE — SIGNIFICANT CHANGE UP
EOSINOPHIL # BLD AUTO: 0.2 K/UL — SIGNIFICANT CHANGE UP (ref 0–0.5)
EOSINOPHIL NFR BLD AUTO: 2.2 % — SIGNIFICANT CHANGE UP (ref 0–6)
GAS PNL BLDV: 133 MMOL/L — LOW (ref 135–145)
GAS PNL BLDV: SIGNIFICANT CHANGE UP
GAS PNL BLDV: SIGNIFICANT CHANGE UP
GLUCOSE BLDV-MCNC: 103 MG/DL — HIGH (ref 70–99)
GLUCOSE SERPL-MCNC: 99 MG/DL — SIGNIFICANT CHANGE UP (ref 70–99)
GLUCOSE UR QL: NEGATIVE — SIGNIFICANT CHANGE UP
HCO3 BLDV-SCNC: 26 MMOL/L — SIGNIFICANT CHANGE UP (ref 21–29)
HCT VFR BLD CALC: 42.6 % — SIGNIFICANT CHANGE UP (ref 34.5–45)
HCT VFR BLDA CALC: 44 % — SIGNIFICANT CHANGE UP (ref 39–50)
HGB BLD CALC-MCNC: 14.4 G/DL — SIGNIFICANT CHANGE UP (ref 11.5–15.5)
HGB BLD-MCNC: 13.6 G/DL — SIGNIFICANT CHANGE UP (ref 11.5–15.5)
IMM GRANULOCYTES NFR BLD AUTO: 0.3 % — SIGNIFICANT CHANGE UP (ref 0–1.5)
KETONES UR-MCNC: NEGATIVE — SIGNIFICANT CHANGE UP
LACTATE BLDV-MCNC: 0.9 MMOL/L — SIGNIFICANT CHANGE UP (ref 0.7–2)
LEUKOCYTE ESTERASE UR-ACNC: NEGATIVE — SIGNIFICANT CHANGE UP
LIDOCAIN IGE QN: 52 U/L — SIGNIFICANT CHANGE UP (ref 7–60)
LYMPHOCYTES # BLD AUTO: 1.77 K/UL — SIGNIFICANT CHANGE UP (ref 1–3.3)
LYMPHOCYTES # BLD AUTO: 19.5 % — SIGNIFICANT CHANGE UP (ref 13–44)
MCHC RBC-ENTMCNC: 28.5 PG — SIGNIFICANT CHANGE UP (ref 27–34)
MCHC RBC-ENTMCNC: 31.9 GM/DL — LOW (ref 32–36)
MCV RBC AUTO: 89.3 FL — SIGNIFICANT CHANGE UP (ref 80–100)
MONOCYTES # BLD AUTO: 0.69 K/UL — SIGNIFICANT CHANGE UP (ref 0–0.9)
MONOCYTES NFR BLD AUTO: 7.6 % — SIGNIFICANT CHANGE UP (ref 2–14)
NEUTROPHILS # BLD AUTO: 6.29 K/UL — SIGNIFICANT CHANGE UP (ref 1.8–7.4)
NEUTROPHILS NFR BLD AUTO: 69.5 % — SIGNIFICANT CHANGE UP (ref 43–77)
NITRITE UR-MCNC: NEGATIVE — SIGNIFICANT CHANGE UP
NRBC # BLD: 0 /100 WBCS — SIGNIFICANT CHANGE UP (ref 0–0)
PCO2 BLDV: 44 MMHG — SIGNIFICANT CHANGE UP (ref 35–50)
PH BLDV: 7.4 — SIGNIFICANT CHANGE UP (ref 7.35–7.45)
PH UR: 7.5 — SIGNIFICANT CHANGE UP (ref 5–8)
PLATELET # BLD AUTO: 194 K/UL — SIGNIFICANT CHANGE UP (ref 150–400)
PO2 BLDV: 28 MMHG — SIGNIFICANT CHANGE UP (ref 25–45)
POTASSIUM BLDV-SCNC: 4.1 MMOL/L — SIGNIFICANT CHANGE UP (ref 3.5–5.3)
POTASSIUM SERPL-MCNC: 4.6 MMOL/L — SIGNIFICANT CHANGE UP (ref 3.5–5.3)
POTASSIUM SERPL-SCNC: 4.6 MMOL/L — SIGNIFICANT CHANGE UP (ref 3.5–5.3)
PROT SERPL-MCNC: 7.5 G/DL — SIGNIFICANT CHANGE UP (ref 6–8.3)
PROT UR-MCNC: NEGATIVE — SIGNIFICANT CHANGE UP
RBC # BLD: 4.77 M/UL — SIGNIFICANT CHANGE UP (ref 3.8–5.2)
RBC # FLD: 13 % — SIGNIFICANT CHANGE UP (ref 10.3–14.5)
SAO2 % BLDV: 51 % — LOW (ref 67–88)
SODIUM SERPL-SCNC: 137 MMOL/L — SIGNIFICANT CHANGE UP (ref 135–145)
SP GR SPEC: 1.01 — LOW (ref 1.01–1.02)
UROBILINOGEN FLD QL: NEGATIVE — SIGNIFICANT CHANGE UP
WBC # BLD: 9.06 K/UL — SIGNIFICANT CHANGE UP (ref 3.8–10.5)
WBC # FLD AUTO: 9.06 K/UL — SIGNIFICANT CHANGE UP (ref 3.8–10.5)

## 2021-06-27 PROCEDURE — 82435 ASSAY OF BLOOD CHLORIDE: CPT

## 2021-06-27 PROCEDURE — 96374 THER/PROPH/DIAG INJ IV PUSH: CPT | Mod: XU

## 2021-06-27 PROCEDURE — 83690 ASSAY OF LIPASE: CPT

## 2021-06-27 PROCEDURE — 85018 HEMOGLOBIN: CPT

## 2021-06-27 PROCEDURE — 81003 URINALYSIS AUTO W/O SCOPE: CPT

## 2021-06-27 PROCEDURE — 74177 CT ABD & PELVIS W/CONTRAST: CPT

## 2021-06-27 PROCEDURE — 82565 ASSAY OF CREATININE: CPT

## 2021-06-27 PROCEDURE — 82803 BLOOD GASES ANY COMBINATION: CPT

## 2021-06-27 PROCEDURE — 85025 COMPLETE CBC W/AUTO DIFF WBC: CPT

## 2021-06-27 PROCEDURE — 99284 EMERGENCY DEPT VISIT MOD MDM: CPT | Mod: 25

## 2021-06-27 PROCEDURE — 87086 URINE CULTURE/COLONY COUNT: CPT

## 2021-06-27 PROCEDURE — 74177 CT ABD & PELVIS W/CONTRAST: CPT | Mod: 26,MG

## 2021-06-27 PROCEDURE — 99285 EMERGENCY DEPT VISIT HI MDM: CPT | Mod: GC

## 2021-06-27 PROCEDURE — 84132 ASSAY OF SERUM POTASSIUM: CPT

## 2021-06-27 PROCEDURE — 83605 ASSAY OF LACTIC ACID: CPT

## 2021-06-27 PROCEDURE — 85014 HEMATOCRIT: CPT

## 2021-06-27 PROCEDURE — 82330 ASSAY OF CALCIUM: CPT

## 2021-06-27 PROCEDURE — 96375 TX/PRO/DX INJ NEW DRUG ADDON: CPT

## 2021-06-27 PROCEDURE — 84295 ASSAY OF SERUM SODIUM: CPT

## 2021-06-27 PROCEDURE — G1004: CPT

## 2021-06-27 PROCEDURE — 82947 ASSAY GLUCOSE BLOOD QUANT: CPT

## 2021-06-27 PROCEDURE — 80053 COMPREHEN METABOLIC PANEL: CPT

## 2021-06-27 RX ORDER — MORPHINE SULFATE 50 MG/1
2 CAPSULE, EXTENDED RELEASE ORAL ONCE
Refills: 0 | Status: DISCONTINUED | OUTPATIENT
Start: 2021-06-27 | End: 2021-06-27

## 2021-06-27 RX ORDER — ACETAMINOPHEN 500 MG
975 TABLET ORAL ONCE
Refills: 0 | Status: DISCONTINUED | OUTPATIENT
Start: 2021-06-27 | End: 2021-06-27

## 2021-06-27 RX ORDER — SODIUM CHLORIDE 9 MG/ML
1000 INJECTION INTRAMUSCULAR; INTRAVENOUS; SUBCUTANEOUS ONCE
Refills: 0 | Status: COMPLETED | OUTPATIENT
Start: 2021-06-27 | End: 2021-06-27

## 2021-06-27 RX ORDER — ONDANSETRON 8 MG/1
4 TABLET, FILM COATED ORAL ONCE
Refills: 0 | Status: COMPLETED | OUTPATIENT
Start: 2021-06-27 | End: 2021-06-27

## 2021-06-27 RX ADMIN — ONDANSETRON 4 MILLIGRAM(S): 8 TABLET, FILM COATED ORAL at 18:45

## 2021-06-27 RX ADMIN — SODIUM CHLORIDE 1000 MILLILITER(S): 9 INJECTION INTRAMUSCULAR; INTRAVENOUS; SUBCUTANEOUS at 18:55

## 2021-06-27 RX ADMIN — SODIUM CHLORIDE 1000 MILLILITER(S): 9 INJECTION INTRAMUSCULAR; INTRAVENOUS; SUBCUTANEOUS at 18:46

## 2021-06-27 RX ADMIN — MORPHINE SULFATE 2 MILLIGRAM(S): 50 CAPSULE, EXTENDED RELEASE ORAL at 18:55

## 2021-06-27 RX ADMIN — MORPHINE SULFATE 2 MILLIGRAM(S): 50 CAPSULE, EXTENDED RELEASE ORAL at 18:45

## 2021-06-27 NOTE — ED PROVIDER NOTE - CLINICAL SUMMARY MEDICAL DECISION MAKING FREE TEXT BOX
87yo female awaiting R inguinal hernia repair p/w worsening pain at hernia site. Tender, reducible, not incarcerated. Unlikely SBO, s/p appendectomy. Lower suspicion for UTI vs kidney stone symptoms. UA, labs, fluids, CT, pain control and reassess, possibly surgical consult.

## 2021-06-27 NOTE — ED PROVIDER NOTE - OBJECTIVE STATEMENT
85yo female hypertension, macular degeneration, hyperlipidemia, hepatitis C (treated 2 years ago), gerd, urinary incontinence, and cataracts scheduled for right inguinal hernia repair on 7/8/2021 with Dr. Terell Herrera p/w severe worsening RLQ pain since last night at site of hernia accompanied by N/V. Last BM today. Other PSH includes appendectomy and hysterectomy. Currently treated for UTI x 3 days on macrobid. Notes occasional dysuria. Denies diarrhea, fever. Was supposed to get inguinal hernia repair earlier this month but required nuclear stress test for cardiac clearance.
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
## 2021-06-27 NOTE — ED PROVIDER NOTE - PROGRESS NOTE DETAILS
Mikel PGY2: spoke to rads resident, delay in CT read as was assigned to day attending that's no longer here, will assign to night attending. No pain and no nausea at this time. Mikel PGY2: Patient reevaluated and feeling better. Eating soup. Reviewed and discussed results with patient. Discussed importance of follow up and return precautions. Patient agrees with plan.

## 2021-06-27 NOTE — ED PROVIDER NOTE - PHYSICAL EXAMINATION
Physical Exam:  Gen: NAD, AOx3, non-toxic appearing, able to ambulate without assistance  Head: NCAT  HEENT: EOMI, PEERLA, normal conjunctiva, tongue midline, oral mucosa moist  Lung: CTAB, no respiratory distress, no wheezes/rhonchi/rales B/L, speaking in full sentences  CV: RRR, no murmurs, rubs or gallops, distal pulses 2+ b/l  Abd: moderate TTP of RLQ worse with deep palpation, hernia reducible, not incarcerated, soft, ND, no guarding, no rigidity, no rebound tenderness, no CVA tenderness   Skin: Warm, well perfused, no rash, no leg swelling  Psych: normal affect, calm
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
## 2021-06-27 NOTE — ED ADULT NURSE NOTE - OBJECTIVE STATEMENT
86 year old female comes in with a known hernia in the right groin scheduled for 7/08 comes in with increased pain >  Pt has not taken anything for pain today NO fever chills noted comes in with expectation of possibly getting sx earlier .  skin intact Pt is nausea and had some vomiting today IVL placed bloods sent as ordered Nito

## 2021-06-27 NOTE — ED PROVIDER NOTE - PMH
Cataract  left eye  Displaced fracture of unspecified radial styloid process, initial encounter for closed fracture    GERD (gastroesophageal reflux disease)    H/O urinary incontinence  treated with botox  Hepatitis C  treated 2 years ago  Hyperlipidemia    Hypertension    Lumbar radiculopathy    Macular degeneration    Osteoarthritis

## 2021-06-27 NOTE — CHART NOTE - NSCHARTNOTEFT_GEN_A_CORE
Previously notified by surgery attending about a patient with known right inguinal hernia coming to ED for severe abdominal pain. Surgery team was not notified about the patient by ED. However, from chart review, patient was found to have arrived in ED. She was hemodynamically stable and labs were within normal limits. CT A/P shows small right fat-containing inguinal hernia without inflammation or entrapped bowel. When I went to evaluate patient, I was told by ED that patient had just been discharged. Patient was found waiting for her ride by the ED exit. Spoke with both the patient and patient's daughter regarding the CT findings. Offered patient admission to hospital and inguinal hernia repair during the admission. However, patient reported that her abdominal pain had improved. She denied nausea or vomiting, and expressed desire to go home and come back for inguinal hernia repair as scheduled. Patient was advised to return to the ED with abdominal pain, nausea or vomiting. Patient expressed understanding and daughter was in agreement with the plan.    Discussed with attending Dr. Francheska Harden, PGY2  Ruth Yu

## 2021-06-27 NOTE — ED PROVIDER NOTE - NSFOLLOWUPINSTRUCTIONS_ED_ALL_ED_FT
- Continue all regular medications including your antibiotic  - For pain, take tylenol or ibuprofen as directed on the packaging  - Follow up with your surgeon within 3 days  - You were given copies of labs and/or imaging results if applicable, please take them to your follow up appointments  - Return to the ER for constant vomiting, severe abdominal pain or any worsening symptoms or concerns

## 2021-06-27 NOTE — ED ADULT NURSE REASSESSMENT NOTE - NS ED NURSE REASSESS COMMENT FT1
Report received from DWAYNE Boyd. Pt a & o x 4, able to follow commands. Breathing spontaneous & nonlabored. IV site patent, no signs of phlebitis, fluids infusing. Family at bedside. Pt assisted onto bed pan. Call bell within reach, bed in lowest position. Awaiting CT.

## 2021-06-27 NOTE — ED PROVIDER NOTE - PATIENT PORTAL LINK FT
You can access the FollowMyHealth Patient Portal offered by [facility] by registering at the following website: [email or web address]. By joining [employer]’s FollowMyHealth portal, you will also be able to view your health information using other applications (apps) compatible with our system.

## 2021-06-28 PROBLEM — H26.9 UNSPECIFIED CATARACT: Chronic | Status: ACTIVE | Noted: 2021-06-25

## 2021-06-28 PROBLEM — Z87.898 PERSONAL HISTORY OF OTHER SPECIFIED CONDITIONS: Chronic | Status: ACTIVE | Noted: 2021-06-25

## 2021-06-29 LAB
CULTURE RESULTS: SIGNIFICANT CHANGE UP
SPECIMEN SOURCE: SIGNIFICANT CHANGE UP

## 2021-07-02 ENCOUNTER — EMERGENCY (EMERGENCY)
Facility: HOSPITAL | Age: 86
LOS: 1 days | Discharge: HOME | End: 2021-07-02
Attending: EMERGENCY MEDICINE
Payer: MEDICARE

## 2021-07-02 ENCOUNTER — APPOINTMENT (OUTPATIENT)
Dept: INTERNAL MEDICINE | Facility: CLINIC | Age: 86
End: 2021-07-02
Payer: MEDICARE

## 2021-07-02 VITALS
SYSTOLIC BLOOD PRESSURE: 129 MMHG | DIASTOLIC BLOOD PRESSURE: 54 MMHG | HEIGHT: 59 IN | TEMPERATURE: 98 F | WEIGHT: 113.98 LBS | OXYGEN SATURATION: 96 % | HEART RATE: 76 BPM | RESPIRATION RATE: 18 BRPM

## 2021-07-02 VITALS
HEART RATE: 74 BPM | RESPIRATION RATE: 18 BRPM | SYSTOLIC BLOOD PRESSURE: 164 MMHG | DIASTOLIC BLOOD PRESSURE: 77 MMHG | OXYGEN SATURATION: 99 % | TEMPERATURE: 98 F

## 2021-07-02 DIAGNOSIS — Z87.19 PERSONAL HISTORY OF OTHER DISEASES OF THE DIGESTIVE SYSTEM: ICD-10-CM

## 2021-07-02 DIAGNOSIS — T14.8XXA OTHER INJURY OF UNSPECIFIED BODY REGION, INITIAL ENCOUNTER: Chronic | ICD-10-CM

## 2021-07-02 DIAGNOSIS — J92.9 PLEURAL PLAQUE W/OUT ASBESTOS: ICD-10-CM

## 2021-07-02 DIAGNOSIS — Z87.898 PERSONAL HISTORY OF OTHER SPECIFIED CONDITIONS: ICD-10-CM

## 2021-07-02 DIAGNOSIS — Z87.442 PERSONAL HISTORY OF URINARY CALCULI: ICD-10-CM

## 2021-07-02 DIAGNOSIS — R25.2 CRAMP AND SPASM: ICD-10-CM

## 2021-07-02 DIAGNOSIS — R10.2 PELVIC AND PERINEAL PAIN: ICD-10-CM

## 2021-07-02 DIAGNOSIS — R10.84 GENERALIZED ABDOMINAL PAIN: ICD-10-CM

## 2021-07-02 DIAGNOSIS — Z86.19 PERSONAL HISTORY OF OTHER INFECTIOUS AND PARASITIC DISEASES: ICD-10-CM

## 2021-07-02 DIAGNOSIS — J30.2 OTHER SEASONAL ALLERGIC RHINITIS: ICD-10-CM

## 2021-07-02 DIAGNOSIS — R10.811 RIGHT UPPER QUADRANT ABDOMINAL TENDERNESS: ICD-10-CM

## 2021-07-02 DIAGNOSIS — R10.9 UNSPECIFIED ABDOMINAL PAIN: ICD-10-CM

## 2021-07-02 DIAGNOSIS — Z90.49 ACQUIRED ABSENCE OF OTHER SPECIFIED PARTS OF DIGESTIVE TRACT: Chronic | ICD-10-CM

## 2021-07-02 DIAGNOSIS — R07.89 OTHER CHEST PAIN: ICD-10-CM

## 2021-07-02 DIAGNOSIS — Z87.42 PERSONAL HISTORY OF OTHER DISEASES OF THE FEMALE GENITAL TRACT: ICD-10-CM

## 2021-07-02 DIAGNOSIS — Z98.890 OTHER SPECIFIED POSTPROCEDURAL STATES: Chronic | ICD-10-CM

## 2021-07-02 DIAGNOSIS — E78.2 MIXED HYPERLIPIDEMIA: ICD-10-CM

## 2021-07-02 DIAGNOSIS — Z87.09 PERSONAL HISTORY OF OTHER DISEASES OF THE RESPIRATORY SYSTEM: ICD-10-CM

## 2021-07-02 DIAGNOSIS — R39.9 UNSPECIFIED SYMPTOMS AND SIGNS INVOLVING THE GENITOURINARY SYSTEM: ICD-10-CM

## 2021-07-02 DIAGNOSIS — Z90.710 ACQUIRED ABSENCE OF BOTH CERVIX AND UTERUS: Chronic | ICD-10-CM

## 2021-07-02 DIAGNOSIS — K76.89 OTHER SPECIFIED DISEASES OF LIVER: ICD-10-CM

## 2021-07-02 DIAGNOSIS — M79.669 PAIN IN UNSPECIFIED LOWER LEG: ICD-10-CM

## 2021-07-02 DIAGNOSIS — J45.40 MODERATE PERSISTENT ASTHMA, UNCOMPLICATED: ICD-10-CM

## 2021-07-02 DIAGNOSIS — L29.9 PRURITUS, UNSPECIFIED: ICD-10-CM

## 2021-07-02 DIAGNOSIS — G89.29 UNSPECIFIED ABDOMINAL PAIN: ICD-10-CM

## 2021-07-02 DIAGNOSIS — N32.89 OTHER SPECIFIED DISORDERS OF BLADDER: ICD-10-CM

## 2021-07-02 DIAGNOSIS — Z98.41 CATARACT EXTRACTION STATUS, RIGHT EYE: Chronic | ICD-10-CM

## 2021-07-02 DIAGNOSIS — Z87.2 PERSONAL HISTORY OF DISEASES OF THE SKIN AND SUBCUTANEOUS TISSUE: ICD-10-CM

## 2021-07-02 DIAGNOSIS — M54.9 DORSALGIA, UNSPECIFIED: ICD-10-CM

## 2021-07-02 DIAGNOSIS — K62.89 OTHER SPECIFIED DISEASES OF ANUS AND RECTUM: ICD-10-CM

## 2021-07-02 DIAGNOSIS — R19.07 GENERALIZED INTRA-ABDOMINAL AND PELVIC SWELLING, MASS AND LUMP: ICD-10-CM

## 2021-07-02 DIAGNOSIS — R91.1 SOLITARY PULMONARY NODULE: ICD-10-CM

## 2021-07-02 DIAGNOSIS — R82.90 UNSPECIFIED ABNORMAL FINDINGS IN URINE: ICD-10-CM

## 2021-07-02 LAB
ALBUMIN SERPL ELPH-MCNC: 4.1 G/DL — SIGNIFICANT CHANGE UP (ref 3.3–5)
ALP SERPL-CCNC: 71 U/L — SIGNIFICANT CHANGE UP (ref 40–120)
ALT FLD-CCNC: 10 U/L — SIGNIFICANT CHANGE UP (ref 10–45)
ANION GAP SERPL CALC-SCNC: 12 MMOL/L — SIGNIFICANT CHANGE UP (ref 5–17)
AST SERPL-CCNC: 11 U/L — SIGNIFICANT CHANGE UP (ref 10–40)
BASOPHILS # BLD AUTO: 0.04 K/UL — SIGNIFICANT CHANGE UP (ref 0–0.2)
BASOPHILS NFR BLD AUTO: 0.4 % — SIGNIFICANT CHANGE UP (ref 0–2)
BILIRUB SERPL-MCNC: 0.2 MG/DL — SIGNIFICANT CHANGE UP (ref 0.2–1.2)
BUN SERPL-MCNC: 18 MG/DL — SIGNIFICANT CHANGE UP (ref 7–23)
CALCIUM SERPL-MCNC: 9.7 MG/DL — SIGNIFICANT CHANGE UP (ref 8.4–10.5)
CHLORIDE SERPL-SCNC: 103 MMOL/L — SIGNIFICANT CHANGE UP (ref 96–108)
CO2 SERPL-SCNC: 22 MMOL/L — SIGNIFICANT CHANGE UP (ref 22–31)
CREAT SERPL-MCNC: 1.01 MG/DL — SIGNIFICANT CHANGE UP (ref 0.5–1.3)
CRP SERPL-MCNC: 27 MG/L — HIGH (ref 0–4)
EOSINOPHIL # BLD AUTO: 0.12 K/UL — SIGNIFICANT CHANGE UP (ref 0–0.5)
EOSINOPHIL NFR BLD AUTO: 1.3 % — SIGNIFICANT CHANGE UP (ref 0–6)
ERYTHROCYTE [SEDIMENTATION RATE] IN BLOOD: 47 MM/HR — HIGH (ref 0–20)
GLUCOSE SERPL-MCNC: 101 MG/DL — HIGH (ref 70–99)
HCT VFR BLD CALC: 39.1 % — SIGNIFICANT CHANGE UP (ref 34.5–45)
HGB BLD-MCNC: 12.6 G/DL — SIGNIFICANT CHANGE UP (ref 11.5–15.5)
IMM GRANULOCYTES NFR BLD AUTO: 0.6 % — SIGNIFICANT CHANGE UP (ref 0–1.5)
LYMPHOCYTES # BLD AUTO: 1.4 K/UL — SIGNIFICANT CHANGE UP (ref 1–3.3)
LYMPHOCYTES # BLD AUTO: 14.6 % — SIGNIFICANT CHANGE UP (ref 13–44)
MCHC RBC-ENTMCNC: 28.4 PG — SIGNIFICANT CHANGE UP (ref 27–34)
MCHC RBC-ENTMCNC: 32.2 GM/DL — SIGNIFICANT CHANGE UP (ref 32–36)
MCV RBC AUTO: 88.3 FL — SIGNIFICANT CHANGE UP (ref 80–100)
MONOCYTES # BLD AUTO: 0.93 K/UL — HIGH (ref 0–0.9)
MONOCYTES NFR BLD AUTO: 9.7 % — SIGNIFICANT CHANGE UP (ref 2–14)
NEUTROPHILS # BLD AUTO: 7.04 K/UL — SIGNIFICANT CHANGE UP (ref 1.8–7.4)
NEUTROPHILS NFR BLD AUTO: 73.4 % — SIGNIFICANT CHANGE UP (ref 43–77)
NRBC # BLD: 0 /100 WBCS — SIGNIFICANT CHANGE UP (ref 0–0)
PLATELET # BLD AUTO: 183 K/UL — SIGNIFICANT CHANGE UP (ref 150–400)
POTASSIUM SERPL-MCNC: 4.7 MMOL/L — SIGNIFICANT CHANGE UP (ref 3.5–5.3)
POTASSIUM SERPL-SCNC: 4.7 MMOL/L — SIGNIFICANT CHANGE UP (ref 3.5–5.3)
PROCALCITONIN SERPL-MCNC: 0.03 NG/ML — SIGNIFICANT CHANGE UP (ref 0.02–0.1)
PROT SERPL-MCNC: 7.2 G/DL — SIGNIFICANT CHANGE UP (ref 6–8.3)
RBC # BLD: 4.43 M/UL — SIGNIFICANT CHANGE UP (ref 3.8–5.2)
RBC # FLD: 12.8 % — SIGNIFICANT CHANGE UP (ref 10.3–14.5)
SARS-COV-2 RNA SPEC QL NAA+PROBE: SIGNIFICANT CHANGE UP
SODIUM SERPL-SCNC: 137 MMOL/L — SIGNIFICANT CHANGE UP (ref 135–145)
WBC # BLD: 9.59 K/UL — SIGNIFICANT CHANGE UP (ref 3.8–10.5)
WBC # FLD AUTO: 9.59 K/UL — SIGNIFICANT CHANGE UP (ref 3.8–10.5)

## 2021-07-02 PROCEDURE — U0003: CPT

## 2021-07-02 PROCEDURE — 85652 RBC SED RATE AUTOMATED: CPT

## 2021-07-02 PROCEDURE — 99213 OFFICE O/P EST LOW 20 MIN: CPT

## 2021-07-02 PROCEDURE — 99284 EMERGENCY DEPT VISIT MOD MDM: CPT | Mod: 25

## 2021-07-02 PROCEDURE — 12011 RPR F/E/E/N/L/M 2.5 CM/<: CPT

## 2021-07-02 PROCEDURE — 71045 X-RAY EXAM CHEST 1 VIEW: CPT | Mod: 26

## 2021-07-02 PROCEDURE — U0005: CPT

## 2021-07-02 PROCEDURE — 80053 COMPREHEN METABOLIC PANEL: CPT

## 2021-07-02 PROCEDURE — 85025 COMPLETE CBC W/AUTO DIFF WBC: CPT

## 2021-07-02 PROCEDURE — 86140 C-REACTIVE PROTEIN: CPT

## 2021-07-02 PROCEDURE — 71045 X-RAY EXAM CHEST 1 VIEW: CPT

## 2021-07-02 PROCEDURE — 73564 X-RAY EXAM KNEE 4 OR MORE: CPT | Mod: 26,LT

## 2021-07-02 PROCEDURE — 84145 PROCALCITONIN (PCT): CPT

## 2021-07-02 PROCEDURE — 93971 EXTREMITY STUDY: CPT | Mod: 26,LT

## 2021-07-02 PROCEDURE — 93971 EXTREMITY STUDY: CPT

## 2021-07-02 PROCEDURE — 73564 X-RAY EXAM KNEE 4 OR MORE: CPT

## 2021-07-02 RX ORDER — ACETAMINOPHEN 500 MG
975 TABLET ORAL ONCE
Refills: 0 | Status: COMPLETED | OUTPATIENT
Start: 2021-07-02 | End: 2021-07-02

## 2021-07-02 RX ADMIN — Medication 975 MILLIGRAM(S): at 12:10

## 2021-07-02 NOTE — ED PROVIDER NOTE - NSFOLLOWUPCLINICS_GEN_ALL_ED_FT
[facility]  Orthopedic Surgery  [address] [address]  Phone: [phone]  Fax:
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
## 2021-07-02 NOTE — ED PROVIDER NOTE - NSFOLLOWUPINSTRUCTIONS_ED_ALL_ED_FT
See Orthopedic Clinic (rapid referral) for close reevaluation -- call to discuss.    Use Acetaminophen as directed for pain -- see medication warnings.    Use ACE Wrap, Ice, Crutches as directed, limit strenuous physical activity.    See [employer] [facility] information and return instructions given to you.    See See Orthopedic Clinic (rapid referral) for close reevaluation -- call to discuss.    Use Acetaminophen as directed for pain -- see medication warnings.    Use ACE Wrap, Ice, Crutches as directed, limit strenuous physical activity.    See [employer] [facility] information and return instructions given to you.    Seek immediate medical care for new/worse symptoms/concerns.

## 2021-07-02 NOTE — ED ADULT NURSE NOTE - OBJECTIVE STATEMENT
86 female hx asthma c/o left knee pain/swelling since Tuesday. States she woke up with the pain and has had difficulty walking, no known trauma or injuries. Took Tylenol yesterday but nothing for pain today. Saw her PMD who advised her to be seen to r/o DVT and advised her to ask for prednisone for a dry cough. She denies CP, SOB. On exam left knee is swollen, pain with palpation and any ROM. No redness/erythema. No deformity.

## 2021-07-02 NOTE — ED PROVIDER NOTE - OBJECTIVE STATEMENT
85 y/o F, PMH of Asthma and HTN, presents to ED c/o L knee pain x 4 days. Pt denies trauma or fall. Pt reports that the pain is worse when she moves it, and has made it very difficult for her to walk at home. Pt was seen by her PCP and sent to ED to r/o DVT. Pt denies f/c, CP, SOB, weakness or numbness.

## 2021-07-02 NOTE — ED PROVIDER NOTE - PATIENT PORTAL LINK FT
You can access the FollowMyHealth Patient Portal offered by [facility] by registering at the following website: [email or web address]. By joining [employer]’s FollowMyHealth portal, you will also be able to view your health information using other applications (apps) compatible with our system.

## 2021-07-02 NOTE — ED PROVIDER NOTE - ATTENDING CONTRIBUTION TO CARE
------------ATTENDING NOTE------------  pt w/ daughter c/o L knee pain/swelling over past week, worse w/ walking/prolonged standing, moderate ache behind L knee, no trauma, no redness/warmth, no fevers, w/u and exam c/w Baker's Cyst, safely ambulatory w/ Ace Wrap/crutches, nvi w/. bcr distally, nml VS at [place], in depth dw all about ddx, tx, montiel, continued close outpt fu.  - Samy Cunha MD   -----------------------------------------------

## 2021-07-06 NOTE — ED POST DISCHARGE NOTE - ADDITIONAL DOCUMENTATION
7/6/21: pt called to have reports sent to pcp, MILAN Ayon, will confirm fax and send. -Josette Rivera PA-C

## 2021-07-08 ENCOUNTER — APPOINTMENT (OUTPATIENT)
Dept: SURGERY | Facility: HOSPITAL | Age: 86
End: 2021-07-08

## 2021-07-10 ENCOUNTER — APPOINTMENT (OUTPATIENT)
Dept: INTERNAL MEDICINE | Facility: CLINIC | Age: 86
End: 2021-07-10
Payer: MEDICARE

## 2021-07-10 VITALS
WEIGHT: 114 LBS | OXYGEN SATURATION: 97 % | HEART RATE: 98 BPM | TEMPERATURE: 98 F | HEIGHT: 59 IN | BODY MASS INDEX: 22.98 KG/M2 | DIASTOLIC BLOOD PRESSURE: 62 MMHG | SYSTOLIC BLOOD PRESSURE: 125 MMHG

## 2021-07-10 PROCEDURE — 99212 OFFICE O/P EST SF 10 MIN: CPT

## 2021-07-10 NOTE — HISTORY OF PRESENT ILLNESS
[FreeTextEntry8] : Pt c/o cough with white mucus started 2 weeks ago. The cough and chest congestion is progressively worse. \par Also mild  SOB on coughing.\par No fever,chills.\par \par Pt was at hospital 1 week ago for left leg pain. A doppler of lower extremity was normal.

## 2021-07-10 NOTE — HISTORY OF PRESENT ILLNESS
[Asthma] : asthma [No Adverse Anesthesia Reaction] : no adverse anesthesia reaction in self or family member [Coronary Artery Disease] : coronary artery disease [(Patient denies any chest pain, claudication, dyspnea on exertion, orthopnea, palpitations or syncope)] : Patient denies any chest pain, claudication, dyspnea on exertion, orthopnea, palpitations or syncope [Atrial Fibrillation] : no atrial fibrillation [Recent Myocardial Infarction] : no recent myocardial infarction [Implantable Device/Pacemaker] : no implantable device/pacemaker [COPD] : no COPD [Sleep Apnea] : no sleep apnea [Smoker] : not a smoker [Family Member] : no family member with adverse anesthesia reaction/sudden death [Self] : no previous adverse anesthesia reaction [Chronic Anticoagulation] : no chronic anticoagulation [Chronic Kidney Disease] : no chronic kidney disease [Diabetes] : no diabetes [FreeTextEntry1] : Right inguinal herniotomy [FreeTextEntry2] : 7/8/21 [FreeTextEntry3] :   [FreeTextEntry4] : Pt has multiple complains\par she was at hospital 1 week ago for right lower extremity pain- pt was on Morphine inj.\par left calf pain radiating to left thigh started 3 days ago but  last night had the worse pain on scale 10/10\par mild asthma exacerbation that is relived on taking Albuterol and Flovent. \par No fever,chills,SOB\par \par \par \par  [FreeTextEntry7] : Pt is under cardiologist care- had cardiac medical clearance.

## 2021-07-10 NOTE — PHYSICAL EXAM
[No Acute Distress] : no acute distress [Well Nourished] : well nourished [Well Developed] : well developed [No Respiratory Distress] : no respiratory distress  [No Accessory Muscle Use] : no accessory muscle use [Normal Rate] : normal rate  [Regular Rhythm] : with a regular rhythm [Normal S1, S2] : normal S1 and S2 [Alert and Oriented x3] : oriented to person, place, and time [Normal Mood] : the mood was normal [de-identified] : slight wheezing

## 2021-07-10 NOTE — PHYSICAL EXAM
[No Acute Distress] : no acute distress [Well Nourished] : well nourished [Well Developed] : well developed [Well-Appearing] : well-appearing [Normal Sclera/Conjunctiva] : normal sclera/conjunctiva [PERRL] : pupils equal round and reactive to light [EOMI] : extraocular movements intact [Normal Outer Ear/Nose] : the outer ears and nose were normal in appearance [Normal Oropharynx] : the oropharynx was normal [No JVD] : no jugular venous distention [No Lymphadenopathy] : no lymphadenopathy [Supple] : supple [Thyroid Normal, No Nodules] : the thyroid was normal and there were no nodules present [No Respiratory Distress] : no respiratory distress  [No Accessory Muscle Use] : no accessory muscle use [Clear to Auscultation] : lungs were clear to auscultation bilaterally [Normal Rate] : normal rate  [Regular Rhythm] : with a regular rhythm [Normal S1, S2] : normal S1 and S2 [No Murmur] : no murmur heard [No Carotid Bruits] : no carotid bruits [No Abdominal Bruit] : a ~M bruit was not heard ~T in the abdomen [No Varicosities] : no varicosities [Pedal Pulses Present] : the pedal pulses are present [No Edema] : there was no peripheral edema [No Palpable Aorta] : no palpable aorta [No Extremity Clubbing/Cyanosis] : no extremity clubbing/cyanosis [Soft] : abdomen soft [Non Tender] : non-tender [Non-distended] : non-distended [No Masses] : no abdominal mass palpated [No HSM] : no HSM [Normal Bowel Sounds] : normal bowel sounds [Normal Posterior Cervical Nodes] : no posterior cervical lymphadenopathy [Normal Anterior Cervical Nodes] : no anterior cervical lymphadenopathy [No CVA Tenderness] : no CVA  tenderness [No Spinal Tenderness] : no spinal tenderness [No Rash] : no rash [Coordination Grossly Intact] : coordination grossly intact [No Focal Deficits] : no focal deficits [Normal Gait] : normal gait [Deep Tendon Reflexes (DTR)] : deep tendon reflexes were 2+ and symmetric [Normal Affect] : the affect was normal [Alert and Oriented x3] : oriented to person, place, and time [Normal Insight/Judgement] : insight and judgment were intact [de-identified] : left calf and thigh tenderness

## 2021-07-13 ENCOUNTER — APPOINTMENT (OUTPATIENT)
Dept: ORTHOPEDIC SURGERY | Facility: CLINIC | Age: 86
End: 2021-07-13
Payer: MEDICARE

## 2021-07-13 VITALS — HEIGHT: 59 IN | WEIGHT: 114 LBS | BODY MASS INDEX: 22.98 KG/M2

## 2021-07-13 DIAGNOSIS — M17.12 UNILATERAL PRIMARY OSTEOARTHRITIS, LEFT KNEE: ICD-10-CM

## 2021-07-13 PROCEDURE — 99213 OFFICE O/P EST LOW 20 MIN: CPT

## 2021-07-13 PROCEDURE — 99203 OFFICE O/P NEW LOW 30 MIN: CPT

## 2021-07-13 NOTE — DISCUSSION/SUMMARY
[de-identified] : The underlying pathophysiology was reviewed in great detail with the patient as well as the various treatment options, including ice, analgesics, NSAIDs, Physical therapy, steroid injections, hyaluronic gel injections, aspiration.\par \par Information was provided for gel injections today. Patient will review and research injections and contact office once she  has made a decision.\par \par A home exercise sheet was given and discussed with the patient to follow.A Thera-Band was provided for exercise program.\par \par A prescription for Physical Therapy was provided.\par \par  Activity modifications and restrictions were discussed. I advised avoiding deep bending, squatting and high intensity activity.\par \par I have recommended utilizing a knee sleeve to provide added support and stability. \par \par FU 6 weeks. \par \par All questions were answered, all alternatives discussed and the patient is in complete agreement with that plan. Follow-up appointment as instructed. Any issues and the patient will call or come in sooner.\par \par

## 2021-07-13 NOTE — CONSULT LETTER
[Dear  ___] : Dear  [unfilled], [Consult Letter:] : I had the pleasure of evaluating your patient, [unfilled]. [Please see my note below.] : Please see my note below. [Consult Closing:] : Thank you very much for allowing me to participate in the care of this patient.  If you have any questions, please do not hesitate to contact me. [Sincerely,] : Sincerely, [FreeTextEntry3] : Dr. Yash Delong

## 2021-07-13 NOTE — PHYSICAL EXAM
[de-identified] : Right Lower Extremity\par o Knee :\par ¦ Inspection/Palpation : no tenderness to palpation, no swelling, no deformity\par ¦ Range of Motion : 0 - 120 degrees, no crepitus\par ¦ Stability : no valgus or varus instability present on provocative testing, Lachman’s Test (-)\par ¦ Strength : hip flexion 5/5\par o Muscle Bulk : normal muscle bulk present\par o Skin : no erythema, no ecchymosis\par o Sensation : sensation to pin intact\par o Vascular Exam : no edema, no cyanosis, dorsalis pedis artery pulse 2+, posterior tibial artery pulse 2+\par \par Left Lower Extremity\par o Knee :\par ¦ Inspection/Palpation : medial tenderness to palpation, no fullness of the popliteal fossa, no deformity\par ¦ Range of Motion : 0 -120 degrees, no crepitus\par ¦ Stability : no valgus or varus instability present on provocative testing, Lachman’s Test (-)\par ¦ Strength : hip flexion 5-/5 \par o Muscle Bulk : normal muscle bulk present\par o Skin : no erythema, no ecchymosis\par o Sensation : sensation to pin intact\par o Vascular Exam : no edema, no cyanosis, dorsalis pedis artery pulse 2+, posterior tibial artery pulse 2+  [de-identified] : Patient comes to today's visit with outside imaging already performed. I reviewed the images in detail with the patient and discussed the findings as highlighted below.\par \par o Xray of the left knee  performed on 07/02/2021 at [facility]: \par Reveals mild to moderate tricompartmental osteoarthritis, no acute fracture.\par \par o DUPLEX EXT VEINS LOWER LT performed on 07/02/2021 at [facility]: impression: \par ¦ A popliteal cyst is seen, measuring 4.0 x 0.7 cm.\par ¦ No evidence of left lower extremity deep venous thrombosis.\par \par

## 2021-07-13 NOTE — HISTORY OF PRESENT ILLNESS
[de-identified] : SAJAN APARICIO is a 86 year female presenting to the office complaining of left knee pain. Patient's primary language is Italian.  She  presents to the office ambulating independently. Patient reports pain began on 06/28/2021. Denies injury or trauma to the area. Patient went to [facility] ED on 07/02/2021 where she had a duplex US negative for a  DVT, positive for a bakers cyst, and a xray of the left knee revealing degenerative changes.  She notes decreased pain since onset.   The patient describes the pain as a dull aching, and occasionally sharp pain localized to the medial and posterior aspect of her left knee that is intermittent in nature. Her   symptoms are exacerbated with walking, stairs and standing from a seated position.  Pain is alleviated with rest.  Patient denies instability, catching or locking of the knee. \par Patient is taking Tylenol for pain relief with mild to moderate relief in symptoms. Of note PMHx of osteoporosis. \par Patient denies any other complaints at this time.

## 2021-07-15 ENCOUNTER — NON-APPOINTMENT (OUTPATIENT)
Age: 86
End: 2021-07-15

## 2021-07-15 ENCOUNTER — TRANSCRIPTION ENCOUNTER (OUTPATIENT)
Age: 86
End: 2021-07-15

## 2021-07-20 ENCOUNTER — APPOINTMENT (OUTPATIENT)
Dept: ORTHOPEDIC SURGERY | Facility: CLINIC | Age: 86
End: 2021-07-20

## 2021-07-20 NOTE — HISTORY OF PRESENT ILLNESS
[de-identified] : SAJAN APARICIO is a 86 year female presenting to the office complaining of left knee pain. Patient's primary language is Frisian.  She  presents to the office ambulating independently. Patient reports pain began on 06/28/2021. Denies injury or trauma to the area. Patient went to [facility] ED on 07/02/2021 where she had a duplex US negative for a  DVT, positive for a bakers cyst, and a xray of the left knee revealing degenerative changes.  She notes decreased pain since onset.   The patient describes the pain as a dull aching, and occasionally sharp pain localized to the medial and posterior aspect of her left knee that is intermittent in nature. Her   symptoms are exacerbated with walking, stairs and standing from a seated position.  Pain is alleviated with rest.  Patient denies instability, catching or locking of the knee. \par Patient is taking Tylenol for pain relief with mild to moderate relief in symptoms. Of note PMHx of osteoporosis. \par Patient denies any other complaints at this time.

## 2021-07-20 NOTE — PHYSICAL EXAM
[de-identified] : Right Lower Extremity\par o Knee :\par ¦ Inspection/Palpation : no tenderness to palpation, no swelling, no deformity\par ¦ Range of Motion : 0 - 120 degrees, no crepitus\par ¦ Stability : no valgus or varus instability present on provocative testing, Lachman’s Test (-)\par ¦ Strength : hip flexion 5/5\par o Muscle Bulk : normal muscle bulk present\par o Skin : no erythema, no ecchymosis\par o Sensation : sensation to pin intact\par o Vascular Exam : no edema, no cyanosis, dorsalis pedis artery pulse 2+, posterior tibial artery pulse 2+\par \par Left Lower Extremity\par o Knee :\par ¦ Inspection/Palpation : medial tenderness to palpation, no fullness of the popliteal fossa, no deformity\par ¦ Range of Motion : 0 -120 degrees, no crepitus\par ¦ Stability : no valgus or varus instability present on provocative testing, Lachman’s Test (-)\par ¦ Strength : hip flexion 5-/5 \par o Muscle Bulk : normal muscle bulk present\par o Skin : no erythema, no ecchymosis\par o Sensation : sensation to pin intact\par o Vascular Exam : no edema, no cyanosis, dorsalis pedis artery pulse 2+, posterior tibial artery pulse 2+  [de-identified] : Patient comes to today's visit with outside imaging already performed. I reviewed the images in detail with the patient and discussed the findings as highlighted below.\par \par o Xray of the left knee  performed on 07/02/2021 at [facility]: \par Reveals mild to moderate tricompartmental osteoarthritis, no acute fracture.\par \par o DUPLEX EXT VEINS LOWER LT performed on 07/02/2021 at [facility]: impression: \par ¦ A popliteal cyst is seen, measuring 4.0 x 0.7 cm.\par ¦ No evidence of left lower extremity deep venous thrombosis.\par \par

## 2021-07-20 NOTE — DISCUSSION/SUMMARY
[de-identified] : The underlying pathophysiology was reviewed in great detail with the patient as well as the various treatment options, including ice, analgesics, NSAIDs, Physical therapy, steroid injections, hyaluronic gel injections, aspiration.\par \par Information was provided for gel injections today. Patient will review and research injections and contact office once she  has made a decision.\par \par A home exercise sheet was given and discussed with the patient to follow.A Thera-Band was provided for exercise program.\par \par A prescription for Physical Therapy was provided.\par \par  Activity modifications and restrictions were discussed. I advised avoiding deep bending, squatting and high intensity activity.\par \par I have recommended utilizing a knee sleeve to provide added support and stability. \par \par FU 6 weeks. \par \par All questions were answered, all alternatives discussed and the patient is in complete agreement with that plan. Follow-up appointment as instructed. Any issues and the patient will call or come in sooner.\par \par

## 2021-07-25 ENCOUNTER — TRANSCRIPTION ENCOUNTER (OUTPATIENT)
Age: 86
End: 2021-07-25

## 2021-08-02 ENCOUNTER — OUTPATIENT (OUTPATIENT)
Dept: OUTPATIENT SERVICES | Facility: HOSPITAL | Age: 86
LOS: 1 days | End: 2021-08-02
Payer: MEDICARE

## 2021-08-02 VITALS
WEIGHT: 126.1 LBS | DIASTOLIC BLOOD PRESSURE: 70 MMHG | RESPIRATION RATE: 18 BRPM | TEMPERATURE: 99 F | HEIGHT: 59 IN | OXYGEN SATURATION: 98 % | SYSTOLIC BLOOD PRESSURE: 120 MMHG | HEART RATE: 72 BPM

## 2021-08-02 DIAGNOSIS — Z90.49 ACQUIRED ABSENCE OF OTHER SPECIFIED PARTS OF DIGESTIVE TRACT: Chronic | ICD-10-CM

## 2021-08-02 DIAGNOSIS — T14.8XXA OTHER INJURY OF UNSPECIFIED BODY REGION, INITIAL ENCOUNTER: Chronic | ICD-10-CM

## 2021-08-02 DIAGNOSIS — K40.90 UNILATERAL INGUINAL HERNIA, WITHOUT OBSTRUCTION OR GANGRENE, NOT SPECIFIED AS RECURRENT: ICD-10-CM

## 2021-08-02 DIAGNOSIS — Z98.41 CATARACT EXTRACTION STATUS, RIGHT EYE: Chronic | ICD-10-CM

## 2021-08-02 DIAGNOSIS — Z87.19 PERSONAL HISTORY OF OTHER DISEASES OF THE DIGESTIVE SYSTEM: ICD-10-CM

## 2021-08-02 DIAGNOSIS — Z90.710 ACQUIRED ABSENCE OF BOTH CERVIX AND UTERUS: Chronic | ICD-10-CM

## 2021-08-02 DIAGNOSIS — Z98.890 OTHER SPECIFIED POSTPROCEDURAL STATES: Chronic | ICD-10-CM

## 2021-08-02 DIAGNOSIS — Z01.818 ENCOUNTER FOR OTHER PREPROCEDURAL EXAMINATION: ICD-10-CM

## 2021-08-02 PROCEDURE — G0463: CPT

## 2021-08-02 RX ORDER — METHENAMINE MANDELATE 1 G
1 TABLET ORAL
Qty: 0 | Refills: 0 | DISCHARGE

## 2021-08-02 NOTE — H&P PST ADULT - HISTORY OF PRESENT ILLNESS
86 year old female presents to presurgical testing with PMH significant for hypertension, macular degeneration, hyperlipidemia, hepatitis C (treated 2 years ago), gerd, urinary incontinence, and cataracts.  She is scheduled for right inguinal hernia repair on 7/8/2021 with Dr. Terell Herrera.  She states that she has intermittent pain in her right pelvic region.  She denies recent known COVID exposure, fever, chills, n/v, diarrhea, abdominal pain, sore throat, headaches, SOB, chest pain, palpitations, rhinnorhea, nasal congestion, headaches and change in taste/smell.    COVID Vaccine Card on file ([facility] 3/3/21 and 3/21/21)  PCP clearance with Dr. Johnson scheduled for 7/2/21 (EKG, Echo, and recent stress to be reviewed at that time) 86 year old female presents to presurgical testing with PMH significant for hypertension, macular degeneration, hyperlipidemia, hepatitis C (treated 2 years ago), gerd, urinary incontinence, and cataracts.  She is scheduled for right inguinal hernia repair on /8/12/2021 with Dr. Terell Herrera.  She states that she has intermittent pain in her right pelvic region, increased discomfort  She denies recent known COVID exposure, fever, chills, n/v, diarrhea, abdominal pain, sore throat, headaches, SOB, chest pain, palpitations, rhinnorhea, nasal congestion, headaches and change in taste/smell.  previous surgery was cancelled in 8/12/21 due to sick  ( cold)     COVID Vaccine Card on file ([facility] 3/3/21 and 3/21/21)  PCP clearance with Dr. Johnson scheduled for 8/12/21 (EKG, Echo, and recent stress to be reviewed at that time) 86 year old female presents to presurgical testing with PMH significant for hypertension, macular degeneration, hyperlipidemia, hepatitis C (treated 2 years ago), gerd, urinary incontinence, and cataracts.  She is scheduled for right inguinal hernia repair on /8/12/2021 with Dr. Terell Herrera.  She states that she has intermittent pain in her right pelvic region, increased discomfort.  previous surgery was cancelled in 8/12/21 due to sick  ( cold)     COVID Vaccine Card on file ([facility] 3/3/21 and 3/21/21)  PCP clearance with Dr. Johnson scheduled for 8/12/21   Recent ED visit for left calf pain-  pain resolved - doppler done Neg for DVT 86 year old female presents to presurgical testing with PMH significant for hypertension, macular degeneration, hyperlipidemia, hepatitis C (treated 2 years ago), gerd, urinary incontinence, and cataracts.  She is scheduled for right inguinal hernia repair on 7/12/2021 with Dr. Terell Herrera.  She states that she has intermittent pain in her right pelvic region, increased discomfort.    previous surgery was cancelled in 7/12/21 due to sick  ( cold)     COVID Vaccine Card on file ([facility] 3/3/21 and 3/21/21)  Recent ED visit for left calf pain-  pain resolved - doppler done Neg for DVT 86 year old female presents to presurgical testing with PMH significant for hypertension, macular degeneration, hyperlipidemia, hepatitis C (treated 2 years ago), gerd, urinary incontinence, and cataracts.  She is scheduled for right inguinal hernia repair on 8/12/2021 with Dr. Terell Herrera.  She states that she has intermittent pain in her right pelvic region, increased discomfort.    previous surgery was cancelled in 7/12/21 due to sick  ( cold)     COVID Vaccine Card on file ([facility] 3/3/21 and 3/21/21)  Recent ED visit for left calf pain-  pain resolved - doppler done Neg for DVT

## 2021-08-02 NOTE — H&P PST ADULT - NSICDXPROBLEM_GEN_ALL_CORE_FT
PROBLEM DIAGNOSES  Problem: Unilateral inguinal hernia without obstruction or gangrene  Assessment and Plan: -Scheduled for right inguinal hernia repair 7/8/21 with Dr. Herrera  -CBC & BMP today  -COVID Vaccine Card on file ([facility] 3/3/21 and 3/21/21)  -PCP clearanc scheduled for 7/2/21 with Dr. Johnson (to review EKG, Echo, and Nuclear stress at that time)  -Preop instructions provided and both patient and daughter (Renetta) stated understanding   -Surgical scrub provided, along with directions for use  -Patient advised to take losartan omeprazole, and carvedilol DOS in am with sip of water.  -Ample time was provided for patient and daughter to ask questions and have them answered.     Problem: Right inguinal hernia  Assessment and Plan:        PROBLEM DIAGNOSES  Problem: History of right inguinal hernia  Assessment and Plan: -Scheduled for right inguinal hernia repair 7/8/21 with Dr. Herrera  -CBC & BMP on file from 7/2/21  -COVID Vaccine Card on file ([facility] 3/3/21 and 3/21/21)  -PCP clearanc scheduled for 8/3/21 with Dr. Johnson (to review EKG, Echo, and Nuclear stress at that time)  -Preop instructions provided and both patient and daughter (Renetta) stated understanding   -Surgical scrub provided, along with directions for use  -Patient advised to take losartan omeprazole, and carvedilol DOS in am with sip of water.         PROBLEM DIAGNOSES  Problem: History of right inguinal hernia  Assessment and Plan: -Scheduled for right inguinal hernia repair 7/8/21 with Dr. Herrera  -CBC & BMP on file from 7/2/21  -COVID Vaccine Card on file ([facility] 3/3/21 and 3/21/21)  -PCP clearanc scheduled for 8/3/21 with Dr. Johnson   -Preop instructions provided and both patient and daughter (Renetta) stated understanding   -Surgical scrub provided, along with directions for use  -Patient advised to take losartan omeprazole, and carvedilol DOS in am with sip of water.
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
## 2021-08-02 NOTE — H&P PST ADULT - MUSCULOSKELETAL
normal/no joint erythema/no joint warmth/no calf tenderness/normal strength/decreased ROM due to pain details… detailed exam

## 2021-08-05 ENCOUNTER — NON-APPOINTMENT (OUTPATIENT)
Age: 86
End: 2021-08-05

## 2021-08-05 ENCOUNTER — APPOINTMENT (OUTPATIENT)
Dept: INTERNAL MEDICINE | Facility: CLINIC | Age: 86
End: 2021-08-05
Payer: MEDICARE

## 2021-08-05 VITALS
TEMPERATURE: 97.6 F | HEIGHT: 59 IN | DIASTOLIC BLOOD PRESSURE: 66 MMHG | OXYGEN SATURATION: 98 % | WEIGHT: 118 LBS | HEART RATE: 85 BPM | BODY MASS INDEX: 23.79 KG/M2 | SYSTOLIC BLOOD PRESSURE: 132 MMHG

## 2021-08-05 PROCEDURE — 99214 OFFICE O/P EST MOD 30 MIN: CPT | Mod: 25

## 2021-08-05 PROCEDURE — 36415 COLL VENOUS BLD VENIPUNCTURE: CPT

## 2021-08-05 PROCEDURE — 93000 ELECTROCARDIOGRAM COMPLETE: CPT

## 2021-08-05 RX ORDER — AZITHROMYCIN 250 MG/1
250 TABLET, FILM COATED ORAL
Qty: 6 | Refills: 0 | Status: DISCONTINUED | COMMUNITY
Start: 2021-07-13 | End: 2021-08-05

## 2021-08-05 RX ORDER — PREDNISONE 10 MG/1
10 TABLET ORAL
Qty: 15 | Refills: 0 | Status: DISCONTINUED | COMMUNITY
Start: 2021-07-10 | End: 2021-08-05

## 2021-08-05 NOTE — PHYSICAL EXAM
[No Acute Distress] : no acute distress [Well Nourished] : well nourished [Well Developed] : well developed [Well-Appearing] : well-appearing [Normal Sclera/Conjunctiva] : normal sclera/conjunctiva [PERRL] : pupils equal round and reactive to light [Normal Outer Ear/Nose] : the outer ears and nose were normal in appearance [Normal Oropharynx] : the oropharynx was normal [Normal TMs] : both tympanic membranes were normal [No JVD] : no jugular venous distention [No Lymphadenopathy] : no lymphadenopathy [Supple] : supple [No Respiratory Distress] : no respiratory distress  [No Accessory Muscle Use] : no accessory muscle use [Clear to Auscultation] : lungs were clear to auscultation bilaterally [Normal Rate] : normal rate  [Regular Rhythm] : with a regular rhythm [Normal S1, S2] : normal S1 and S2 [No Murmur] : no murmur heard [No Carotid Bruits] : no carotid bruits [No Varicosities] : no varicosities [No Edema] : there was no peripheral edema [No Extremity Clubbing/Cyanosis] : no extremity clubbing/cyanosis [Soft] : abdomen soft [Non Tender] : non-tender [Non-distended] : non-distended [No Masses] : no abdominal mass palpated [Normal Bowel Sounds] : normal bowel sounds [Normal Posterior Cervical Nodes] : no posterior cervical lymphadenopathy [Normal Anterior Cervical Nodes] : no anterior cervical lymphadenopathy [No Focal Deficits] : no focal deficits [Normal Gait] : normal gait [Normal Affect] : the affect was normal [Normal Insight/Judgement] : insight and judgment were intact

## 2021-08-06 LAB
ALBUMIN SERPL ELPH-MCNC: 3.9 G/DL
ALP BLD-CCNC: 69 U/L
ALT SERPL-CCNC: 15 U/L
ANION GAP SERPL CALC-SCNC: 10 MMOL/L
AST SERPL-CCNC: 25 U/L
BASOPHILS # BLD AUTO: 0.05 K/UL
BASOPHILS NFR BLD AUTO: 0.7 %
BILIRUB SERPL-MCNC: 0.2 MG/DL
BUN SERPL-MCNC: 14 MG/DL
CALCIUM SERPL-MCNC: 9.5 MG/DL
CHLORIDE SERPL-SCNC: 104 MMOL/L
CO2 SERPL-SCNC: 24 MMOL/L
CREAT SERPL-MCNC: 1.08 MG/DL
EOSINOPHIL # BLD AUTO: 0.14 K/UL
EOSINOPHIL NFR BLD AUTO: 2 %
GLUCOSE SERPL-MCNC: 95 MG/DL
HCT VFR BLD CALC: 39.2 %
HGB BLD-MCNC: 12.4 G/DL
IMM GRANULOCYTES NFR BLD AUTO: 0.3 %
LYMPHOCYTES # BLD AUTO: 1.37 K/UL
LYMPHOCYTES NFR BLD AUTO: 19.5 %
MAN DIFF?: NORMAL
MCHC RBC-ENTMCNC: 29 PG
MCHC RBC-ENTMCNC: 31.6 GM/DL
MCV RBC AUTO: 91.6 FL
MONOCYTES # BLD AUTO: 0.65 K/UL
MONOCYTES NFR BLD AUTO: 9.2 %
NEUTROPHILS # BLD AUTO: 4.81 K/UL
NEUTROPHILS NFR BLD AUTO: 68.3 %
PLATELET # BLD AUTO: 178 K/UL
POTASSIUM SERPL-SCNC: 4.6 MMOL/L
PROT SERPL-MCNC: 6.9 G/DL
RBC # BLD: 4.28 M/UL
RBC # FLD: 13.3 %
SODIUM SERPL-SCNC: 138 MMOL/L
WBC # FLD AUTO: 7.04 K/UL

## 2021-08-06 NOTE — PLAN
[FreeTextEntry1] : non fasting labs.\par EKG- NSR 75.  non spec T wave - no chg c/o 12/7/20\par spoke to Dr. Gastelum regarding ECHO , stress test\par pt can use flovent, BP med on AM of procedure.  no OCM for 1 wk prior to surgery

## 2021-08-06 NOTE — REVIEW OF SYSTEMS
[Fever] : no fever [Chills] : no chills [Earache] : no earache [Sore Throat] : no sore throat [Chest Pain] : no chest pain [Palpitations] : no palpitations [Lower Ext Edema] : no lower extremity edema [Shortness Of Breath] : no shortness of breath [Wheezing] : no wheezing [Cough] : no cough [Dyspnea on Exertion] : no dyspnea on exertion [Abdominal Pain] : no abdominal pain [Nausea] : no nausea [Diarrhea] : diarrhea [Vomiting] : no vomiting [Dysuria] : no dysuria [Dizziness] : no dizziness [Easy Bleeding] : no easy bleeding [Easy Bruising] : no easy bruising

## 2021-08-06 NOTE — HISTORY OF PRESENT ILLNESS
[Asthma] : asthma [Coronary Artery Disease] : coronary artery disease [No Adverse Anesthesia Reaction] : no adverse anesthesia reaction in self or family member [FreeTextEntry1] : hernia [FreeTextEntry2] : 08/12/21 [FreeTextEntry3] : Dr. Herrera [FreeTextEntry4] : HTN- pt states her Cardio sent clearance- Dr. Gastelum [phone]. compliant w meds\par chol- compliant w meds\par asthma- no exacerbation since 7/10/21.  using flovent once daily. used albuterol-  last wk\par Use of NSAID/ ASA-no\par steroid use within the past 6 mo- short course 1 mo ago\par hx of bleeding disorders-no\par hx of blood clots-no\par hx of anesthesia reaction-no\par \par Fhx: sudden death-no\par         anesthesia reaction-no\par         clotting disorder-no\par         bleeding disorder-no\par walked 8 blocks today- no CP or SOB\par walking up a flight of stairs- has diff due to arthritis\par \par no snoring or daytime sleepiness [FreeTextEntry7] : 6/11/21 ECHO- EF63 %.  mild MR, TR\par 6/9/21 nuclear stress test - no ischemia-

## 2021-08-09 ENCOUNTER — OUTPATIENT (OUTPATIENT)
Dept: OUTPATIENT SERVICES | Facility: HOSPITAL | Age: 86
LOS: 1 days | End: 2021-08-09
Payer: MEDICARE

## 2021-08-09 DIAGNOSIS — Z90.49 ACQUIRED ABSENCE OF OTHER SPECIFIED PARTS OF DIGESTIVE TRACT: Chronic | ICD-10-CM

## 2021-08-09 DIAGNOSIS — Z98.890 OTHER SPECIFIED POSTPROCEDURAL STATES: Chronic | ICD-10-CM

## 2021-08-09 DIAGNOSIS — Z90.710 ACQUIRED ABSENCE OF BOTH CERVIX AND UTERUS: Chronic | ICD-10-CM

## 2021-08-09 DIAGNOSIS — T14.8XXA OTHER INJURY OF UNSPECIFIED BODY REGION, INITIAL ENCOUNTER: Chronic | ICD-10-CM

## 2021-08-09 DIAGNOSIS — Z11.52 ENCOUNTER FOR SCREENING FOR COVID-19: ICD-10-CM

## 2021-08-09 DIAGNOSIS — Z98.41 CATARACT EXTRACTION STATUS, RIGHT EYE: Chronic | ICD-10-CM

## 2021-08-09 LAB — SARS-COV-2 RNA SPEC QL NAA+PROBE: SIGNIFICANT CHANGE UP

## 2021-08-09 PROCEDURE — U0005: CPT

## 2021-08-09 PROCEDURE — C9803: CPT

## 2021-08-09 PROCEDURE — U0003: CPT

## 2021-08-11 ENCOUNTER — TRANSCRIPTION ENCOUNTER (OUTPATIENT)
Age: 86
End: 2021-08-11

## 2021-08-12 ENCOUNTER — APPOINTMENT (OUTPATIENT)
Dept: SURGERY | Facility: HOSPITAL | Age: 86
End: 2021-08-12
Payer: MEDICARE

## 2021-08-12 ENCOUNTER — OUTPATIENT (OUTPATIENT)
Dept: OUTPATIENT SERVICES | Facility: HOSPITAL | Age: 86
LOS: 1 days | End: 2021-08-12
Payer: MEDICARE

## 2021-08-12 VITALS
HEIGHT: 59 IN | RESPIRATION RATE: 16 BRPM | HEART RATE: 87 BPM | WEIGHT: 123.9 LBS | DIASTOLIC BLOOD PRESSURE: 85 MMHG | TEMPERATURE: 98 F | SYSTOLIC BLOOD PRESSURE: 161 MMHG | OXYGEN SATURATION: 98 %

## 2021-08-12 VITALS
HEART RATE: 70 BPM | TEMPERATURE: 98 F | RESPIRATION RATE: 16 BRPM | SYSTOLIC BLOOD PRESSURE: 155 MMHG | DIASTOLIC BLOOD PRESSURE: 80 MMHG | OXYGEN SATURATION: 100 %

## 2021-08-12 DIAGNOSIS — T14.8XXA OTHER INJURY OF UNSPECIFIED BODY REGION, INITIAL ENCOUNTER: Chronic | ICD-10-CM

## 2021-08-12 DIAGNOSIS — Z98.41 CATARACT EXTRACTION STATUS, RIGHT EYE: Chronic | ICD-10-CM

## 2021-08-12 DIAGNOSIS — Z98.890 OTHER SPECIFIED POSTPROCEDURAL STATES: Chronic | ICD-10-CM

## 2021-08-12 DIAGNOSIS — K40.90 UNILATERAL INGUINAL HERNIA, WITHOUT OBSTRUCTION OR GANGRENE, NOT SPECIFIED AS RECURRENT: ICD-10-CM

## 2021-08-12 DIAGNOSIS — Z01.818 ENCOUNTER FOR OTHER PREPROCEDURAL EXAMINATION: ICD-10-CM

## 2021-08-12 DIAGNOSIS — Z90.710 ACQUIRED ABSENCE OF BOTH CERVIX AND UTERUS: Chronic | ICD-10-CM

## 2021-08-12 DIAGNOSIS — Z90.49 ACQUIRED ABSENCE OF OTHER SPECIFIED PARTS OF DIGESTIVE TRACT: Chronic | ICD-10-CM

## 2021-08-12 PROCEDURE — C1781: CPT

## 2021-08-12 PROCEDURE — 49505 PRP I/HERN INIT REDUC >5 YR: CPT | Mod: RT

## 2021-08-12 RX ORDER — ONDANSETRON 8 MG/1
4 TABLET, FILM COATED ORAL ONCE
Refills: 0 | Status: DISCONTINUED | OUTPATIENT
Start: 2021-08-12 | End: 2021-08-27

## 2021-08-12 RX ORDER — HYDROMORPHONE HYDROCHLORIDE 2 MG/ML
0.25 INJECTION INTRAMUSCULAR; INTRAVENOUS; SUBCUTANEOUS
Refills: 0 | Status: DISCONTINUED | OUTPATIENT
Start: 2021-08-12 | End: 2021-08-12

## 2021-08-12 RX ORDER — CHLORHEXIDINE GLUCONATE 213 G/1000ML
1 SOLUTION TOPICAL ONCE
Refills: 0 | Status: DISCONTINUED | OUTPATIENT
Start: 2021-08-12 | End: 2021-08-12

## 2021-08-12 RX ORDER — SODIUM CHLORIDE 9 MG/ML
1000 INJECTION, SOLUTION INTRAVENOUS
Refills: 0 | Status: DISCONTINUED | OUTPATIENT
Start: 2021-08-12 | End: 2021-08-27

## 2021-08-12 RX ORDER — LIDOCAINE HCL 20 MG/ML
0.2 VIAL (ML) INJECTION ONCE
Refills: 0 | Status: DISCONTINUED | OUTPATIENT
Start: 2021-08-12 | End: 2021-08-12

## 2021-08-12 RX ORDER — SODIUM CHLORIDE 9 MG/ML
3 INJECTION INTRAMUSCULAR; INTRAVENOUS; SUBCUTANEOUS EVERY 8 HOURS
Refills: 0 | Status: DISCONTINUED | OUTPATIENT
Start: 2021-08-12 | End: 2021-08-12

## 2021-08-12 RX ORDER — CEFAZOLIN SODIUM 1 G
2000 VIAL (EA) INJECTION ONCE
Refills: 0 | Status: DISCONTINUED | OUTPATIENT
Start: 2021-08-12 | End: 2021-08-12

## 2021-08-12 RX ORDER — ROSUVASTATIN CALCIUM 5 MG/1
1 TABLET ORAL
Qty: 0 | Refills: 0 | DISCHARGE

## 2021-08-12 RX ORDER — LOSARTAN POTASSIUM 100 MG/1
1 TABLET, FILM COATED ORAL
Qty: 0 | Refills: 0 | DISCHARGE

## 2021-08-12 RX ORDER — CARVEDILOL PHOSPHATE 80 MG/1
1 CAPSULE, EXTENDED RELEASE ORAL
Qty: 0 | Refills: 0 | DISCHARGE

## 2021-08-12 RX ORDER — OXYCODONE HYDROCHLORIDE 5 MG/1
1 TABLET ORAL
Qty: 12 | Refills: 0
Start: 2021-08-12 | End: 2021-08-14

## 2021-08-12 RX ORDER — OXYCODONE HYDROCHLORIDE 5 MG/1
5 TABLET ORAL ONCE
Refills: 0 | Status: DISCONTINUED | OUTPATIENT
Start: 2021-08-12 | End: 2021-08-12

## 2021-08-12 RX ORDER — OMEPRAZOLE 10 MG/1
1 CAPSULE, DELAYED RELEASE ORAL
Qty: 0 | Refills: 0 | DISCHARGE

## 2021-08-12 RX ADMIN — HYDROMORPHONE HYDROCHLORIDE 0.25 MILLIGRAM(S): 2 INJECTION INTRAMUSCULAR; INTRAVENOUS; SUBCUTANEOUS at 16:59

## 2021-08-12 RX ADMIN — HYDROMORPHONE HYDROCHLORIDE 0.25 MILLIGRAM(S): 2 INJECTION INTRAMUSCULAR; INTRAVENOUS; SUBCUTANEOUS at 16:00

## 2021-08-12 NOTE — ASU DISCHARGE PLAN (ADULT/PEDIATRIC) - CARE PROVIDER_API CALL
Terell Herrera)  ColonRectal Surgery; Surgery  [address], [address]  [place] [address]  Phone: [phone]  Fax: [phone]  Follow Up Time:
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
## 2021-08-12 NOTE — PRE-ANESTHESIA EVALUATION ADULT - NSANTHTOBACCOSD_GEN_ALL_CORE
Date of Surgery Update:  Darius Wilkerson was seen and examined. Past Medical History:   Diagnosis Date    Asthma     Chronic pain     KNEES AND BACK    Crohn's disease ([address]     Fatty liver     GERD (gastroesophageal reflux disease)     HTN (hypertension)     NO MEDS    Hyperlipidemia     Ill-defined condition     HIATAL HERNIA, HIV    Migraine     MRSA carrier 2020    Sleep apnea     USES CPAP     Prior to Admission Medications   Prescriptions Last Dose Informant Patient Reported? Taking? cetirizine (ZYRTEC) 10 mg tablet   Yes No   Sig: Take 10 mg by mouth daily as needed for Allergies or Rhinitis. chlorhexidine (HIBICLENS) 4 % liquid   No No   Sig: Apply  mL to affected area daily for 7 days. fish oil-omega-3 fatty acids 340-1,000 mg capsule   Yes No   Sig: Take 1 Cap by mouth two (2) times a day. mupirocin (BACTROBAN) 2 % ointment   No No   Sig: by Both Nostrils route two (2) times a day for 7 days. pramipexole (MIRAPEX) 0.5 mg tablet   Yes No   Sig: Take 0.5 mg by mouth nightly. testosterone cypionate (DEPOTESTOTERONE CYPIONATE) 200 mg/mL injection   Yes No   Si mg by IntraMUSCular route every . therapeutic multivitamin (THERA) tablet   Yes No   Sig: Take 1 Tab by mouth daily. Facility-Administered Medications: None      Allergy to:Vancomycin  Physical Examination: General appearance - alert, well appearing, and in no distress  Chest - clear to auscultation, no wheezes, rales or rhonchi, symmetric air entry  Heart - normal rate and regular rhythm  Abdomen - soft, nontender, nondistended, no masses or organomegaly  History and physical has been reviewed. The patient has been examined.  There have been no significant clinical changes since the completion of the originally dated History and Physical.    Signed By: Aurea Bui PA-C     2020 1:38 PM             PCP: Wiley Mack MD      Problem List      None        Subjective:          Patient Active Problem List   Diagnosis    Septic arthritis      Chief Complaint: Follow-up of the Right Knee     HPI: Abdoul Herr is a 62 y.o. male who returns for follow-up of resection arthroplasty of his right knee total knee revision with placement of antibiotic impregnated cement articulating spacer of knee performed 7/6/19. 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  His previous right total knee revision was performed 2/6/19. He returns today for aspiration of his right knee. He is currently over 2 months post-op. His wife brings his blood-work ordered this week. She states his sed-rate is normal.  His wife notes he is having is HIV levels checked in October, however, she reports the virus is almost undetectable. He reports he has been off anti-biotics for the past two weeks. He notes his right knee is sore to ambulate on. His wife notes he was unable to sleep last night due to his right knee pain  His wife reports his right knee pain is increasing. He is not ambulating with any assistance today.      Objective:      There were no vitals filed for this visit. Height: 5' 11\"       Wt Readings from Last 3 Encounters:   09/20/19 217 lb   08/09/19 217 lb   07/19/19 225 lb      Body mass index is 30.27 kg/m².     Musculoskeletal : Right Knee:  Right knee incision is well-healed, c/d/i. No evidence for infection or DVT. No cellulitis. Normal ROM of bilateral hips with no pain on motion. ROM right knee 0-105 degrees. No significant effusion. Leg lengths appear equal. Strong pedal pulses. No pedal edema. No apparent atrophy. Skin healthy and intact.  Neurovascularly intact.         Procedures           Radiographs:     Order: XR KNEE 3 VW RIGHT - Indication: Infection or inflammatory   reaction due to internal joint prosthesis, subsequent encounter        X-ray Knee Right 3 Views     Result Date: 9/20/2019  Views: Standing AP, Lat, [place].      Impression: 3-view x-rays show a well-aligned antibiotic impregnated spacer knee in place no apparent complication. Assessment:      1. 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 Aftercare following right knee joint replacement surgery       Plan:      I reviewed and discussed the sed-rate with the patient which his wife has brought with her today which were normal.  I reviewed and discussed x-rays ordered of the right knee today with the patient which shows a well-aligned antibiotic impregnated spacer knee in place no apparent complication. I would aspirate his right knee, however, he has been off anti-biotics for the past 2 weeks and his sed-rate is normal.   I am optimistic his infection has been eradicated. We will tentatively schedule him for total knee revision surgery. In the interim, I recommend he follow-up with Dr. Jostin Fonseca to discuss total knee revision surgery. Follow-up for scheduled surgery.         Orders Placed This Encounter    Large Joint Arthrocentesis    X-ray knee right 3 views    BMI >=25 PATIENT INSTRUCTIONS & EDUCATION      Return for Scheduled surgery.      Medical documentation was entered by Glenny galvan for Dr. Ernesto Valladares. 9/20/2019     I, Ariel Ash MD, personally, performed the services described in this documentation, as scribed in my presence, and it is both accurate and complete. No

## 2021-08-12 NOTE — ASU DISCHARGE PLAN (ADULT/PEDIATRIC) - CALL YOUR DOCTOR IF YOU HAVE ANY OF THE FOLLOWING:
Bleeding that does not stop/Swelling that gets worse/Pain not relieved by Medications/Wound/Surgical Site with redness, or foul smelling discharge or pus/Numbness, tingling, color or temperature change to extremity/Nausea and vomiting that does not stop/Unable to urinate

## 2021-08-12 NOTE — ASU DISCHARGE PLAN (ADULT/PEDIATRIC) - ASU DC SPECIAL INSTRUCTIONSFT
You had a right inguinal hernia repair with mesh.  The site is covered with a clear adhesive dressing.  Please keep this dry until tomorrow, 1 day after surgery. Then it may be removed and you may shower. Underneath are small band aids called steri strips, these will fall off on their own, please do not remove them.  You may shower and let the water fall over the steris starting tomorrow. For pain you may take tylenol and motrin over the counter, and you may take oxycodone as needed for severe pain 1 tab every 6 hours as prescribed.  Do not lift anything over 10 pounds for 6 weeks and do not engage in vigorous exercise until you see Dr. Herrera in clinic. Please call the office to make an appointment to follow up in 2 weeks.

## 2021-08-12 NOTE — PRE-ANESTHESIA EVALUATION ADULT - NSANTHPMHFT_GEN_ALL_CORE
GERD controlled  hep C, treated  no recent CP, SOB or syncope GERD controlled  hep C, treated  no recent CP, SOB or syncope  asthma - uses rescue inhaler, but had not required in >1 month

## 2021-08-25 PROBLEM — Z01.818 PREOP TESTING: Status: RESOLVED | Noted: 2020-08-15 | Resolved: 2021-08-25

## 2021-08-25 PROBLEM — K40.90 REDUCIBLE RIGHT INGUINAL HERNIA: Status: RESOLVED | Noted: 2021-01-05 | Resolved: 2021-08-25

## 2021-08-26 ENCOUNTER — APPOINTMENT (OUTPATIENT)
Dept: SURGERY | Facility: CLINIC | Age: 86
End: 2021-08-26
Payer: MEDICARE

## 2021-08-26 VITALS
HEART RATE: 75 BPM | BODY MASS INDEX: 23.99 KG/M2 | OXYGEN SATURATION: 98 % | RESPIRATION RATE: 18 BRPM | HEIGHT: 59 IN | WEIGHT: 119 LBS | SYSTOLIC BLOOD PRESSURE: 157 MMHG | TEMPERATURE: 97.3 F | DIASTOLIC BLOOD PRESSURE: 80 MMHG

## 2021-08-26 DIAGNOSIS — Z01.818 ENCOUNTER FOR OTHER PREPROCEDURAL EXAMINATION: ICD-10-CM

## 2021-08-26 DIAGNOSIS — K40.90 UNILATERAL INGUINAL HERNIA, W/OUT OBSTRUCTION OR GANGRENE, NOT SPECIFIED AS RECURRENT: ICD-10-CM

## 2021-08-26 PROCEDURE — 99024 POSTOP FOLLOW-UP VISIT: CPT

## 2021-08-26 NOTE — ASSESSMENT
[FreeTextEntry1] : In summary the patient is doing well status post right inguinal hernia repair with mesh.  I instructed her that she may resume her normal activities as tolerated and only needs to see me as needed.

## 2021-08-26 NOTE — HISTORY OF PRESENT ILLNESS
[de-identified] : Lxeis is an 87 y/o female here for a post op visit following an 8/12/21 RIH with mesh. Today she reports minimal incisional discomfort. She haven't used any Oxycodone post operatively. Takes OTC analgesics PRN. She reports occasional hard tool. She moves her bowel every other day. Takes Metamucil PRN. Denies fever , chills nausea or vomiting.

## 2021-08-26 NOTE — PHYSICAL EXAM
[de-identified] : Soft, nontender, right groin incision healed without evidence of infection or hernia.

## 2021-09-01 ENCOUNTER — APPOINTMENT (OUTPATIENT)
Dept: UROLOGY | Facility: CLINIC | Age: 86
End: 2021-09-01
Payer: MEDICARE

## 2021-09-01 PROCEDURE — 51798 US URINE CAPACITY MEASURE: CPT

## 2021-09-01 PROCEDURE — 99215 OFFICE O/P EST HI 40 MIN: CPT

## 2021-09-01 NOTE — REVIEW OF SYSTEMS
[Fever] : fever [Chills] : chills [Eyesight Problems] : eyesight problems [Dry Eyes] : dryness of the eyes [Earache] : earache [Cough] : cough [Abdominal Pain] : abdominal pain [Constipation] : constipation [Heartburn] : heartburn [Urine Infection (bladder/kidney)] : bladder/kidney infection [Pain during urination] : pain during urination [Joint Pain] : joint pain [Itching] : itching [Limb Weakness] : limb weakness [Difficulty Walking] : difficulty walking [Muscle Weakness] : muscle weakness [Feelings Of Weakness] : feelings of weakness [Negative] : Heme/Lymph

## 2021-09-06 LAB
APPEARANCE: CLEAR
BACTERIA UR CULT: NORMAL
BACTERIA: NEGATIVE
BILIRUBIN URINE: NEGATIVE
BLOOD URINE: NEGATIVE
COLOR: NORMAL
GLUCOSE QUALITATIVE U: NEGATIVE
HYALINE CASTS: 0 /LPF
KETONES URINE: NEGATIVE
LEUKOCYTE ESTERASE URINE: NEGATIVE
MICROSCOPIC-UA: NORMAL
NITRITE URINE: NEGATIVE
PH URINE: 7
PROTEIN URINE: NEGATIVE
RED BLOOD CELLS URINE: 0 /HPF
SPECIFIC GRAVITY URINE: 1.01
SQUAMOUS EPITHELIAL CELLS: 0 /HPF
UROBILINOGEN URINE: NORMAL
WHITE BLOOD CELLS URINE: 1 /HPF

## 2021-09-17 ENCOUNTER — APPOINTMENT (OUTPATIENT)
Dept: ENDOCRINOLOGY | Facility: CLINIC | Age: 86
End: 2021-09-17
Payer: MEDICARE

## 2021-09-17 VITALS
BODY MASS INDEX: 23.18 KG/M2 | TEMPERATURE: 97.3 F | SYSTOLIC BLOOD PRESSURE: 140 MMHG | HEART RATE: 80 BPM | WEIGHT: 115 LBS | OXYGEN SATURATION: 97 % | HEIGHT: 59 IN | DIASTOLIC BLOOD PRESSURE: 70 MMHG

## 2021-09-17 PROCEDURE — 96372 THER/PROPH/DIAG INJ SC/IM: CPT

## 2021-09-17 PROCEDURE — 99214 OFFICE O/P EST MOD 30 MIN: CPT | Mod: 25

## 2021-09-17 RX ORDER — DENOSUMAB 60 MG/ML
60 INJECTION SUBCUTANEOUS
Qty: 1 | Refills: 0 | Status: COMPLETED | OUTPATIENT
Start: 2021-09-17

## 2021-09-17 RX ADMIN — DENOSUMAB 0 MG/ML: 60 INJECTION SUBCUTANEOUS at 00:00

## 2021-09-17 NOTE — HISTORY OF PRESENT ILLNESS
[FreeTextEntry1] : Patient is an 85 yo woman HTN, HLD, kidney stones and prediabetes establishing endocrine care for osteoporosis\par Diagnosed with osteoporosis more than 10 years ago. She was diagnosed in [place] at the time and injections were prescribed. Had three doses of prolia at the beginning but stopped. Has been off of any osteoporosis medicines since that time. Denies taking any oral bisphosphonates. Was a loss to DXA surveillance as well. Fractured right wrist requiring surgery. States she was walking in her apartment and lost balance. No hip fractures. History of rheumatoid arthritis and kidney stones. The kidney stone episode was many years ago and was treated in the ED. Has not had recent kidney stone episode\par Episode of steroid use for asthma\par \par Menopause: hysterectomy at 37 years old, unsure of why she had hysterectomy\par Takes one multivitamin\par No alcohol\par High risk fall and loss of balance; does not walk with an assisted device\par 58 year old daughter from pancreatic cancer\par Patient lives alone and cooks her meals\par Diet: fruit, vegetables, drink milk with cereal, not much dairy intake\par \par Bone density 2019\par Lumbar spine -4.2\par Femoarl neck -3.8\par \par Bone density March 2021\par L2-L4: BMD 0.611, T score -4.3\par Total hip: BMD 0.575, T score -2.9\par Femoral neck: BMD 0.443, T score -3.7\par \par She takes Vitamin D 5000 IU every other day.  She eat cheese and yogurt.

## 2021-09-17 NOTE — ASSESSMENT
[FreeTextEntry1] : Patient is an 87 yo woman with osteoporosis, prediabetes, HTN and HLD\par \par 1. Osteoporosis\par -patient has severe osteoporosis with hx of wrist fracture last year. She is at high risk for fractures based on poor balance, rheumatoid history as well\par -she had deferred treatment for many years and is willing to start\par -recommend Prolia for her for fracture risk reduction. Hypocalcemia, ONJ/AFF side effects were discussed. No plans for major dental work\par -She's is working with primary care doctor, getting MRI for her neck and seeing physical therapy, she's following up with them.  \par -fall precautions discussed\par -check vitamin D levels\par -Prolia given today.  \par Bone density March 2021\par L2-L4: BMD 0.611, T score -4.3\par Total hip: BMD 0.575, T score -2.9\par Femoral neck: BMD 0.443, T score -3.7\par \par 2. Prediabetes\par -A1C in Dec 2020 is 5.6%\par -Continue with lifestyle changes\par \par 3. HLD\par -LDL is 84 mg/dl in Nov 2020\par -Continue with statin therapy. \par \par 4. HTN\par -BP goal < 140/90. \par \par Risks and benefits of denosumab therapy were discussed with the patient including eczema, cellulitis, osteonecrosis of the jaw and atypical femur fractures. \par

## 2021-10-21 ENCOUNTER — TRANSCRIPTION ENCOUNTER (OUTPATIENT)
Age: 86
End: 2021-10-21

## 2021-11-02 ENCOUNTER — APPOINTMENT (OUTPATIENT)
Dept: SURGERY | Facility: CLINIC | Age: 86
End: 2021-11-02
Payer: MEDICARE

## 2021-11-02 VITALS
RESPIRATION RATE: 16 BRPM | TEMPERATURE: 98.1 F | OXYGEN SATURATION: 98 % | HEART RATE: 60 BPM | SYSTOLIC BLOOD PRESSURE: 132 MMHG | DIASTOLIC BLOOD PRESSURE: 76 MMHG

## 2021-11-02 PROCEDURE — 99024 POSTOP FOLLOW-UP VISIT: CPT

## 2021-11-02 NOTE — PHYSICAL EXAM
[Normal Rate and Rhythm] : normal rate and rhythm [No Rash or Lesion] : No rash or lesion [Alert] : alert [Calm] : calm [de-identified] : nad [de-identified] : Soft, nontender, right groin incision healed without evidence of infection or recurrent/persistent hernia.  There is no palpable right femoral hernia [de-identified] : nl

## 2021-11-02 NOTE — HISTORY OF PRESENT ILLNESS
[de-identified] : Lexis is a 86 year old female here for post op visit, surgical site discomfort. She is s/p right inguinal hernia repair with mesh 8/12/21.\par \par Last seen 8/26/21- Soft, nontender, right groin incision healed without evidence of infection or hernia. Soft, nontender, right groin incision healed without evidence of infection or hernia.  \par \par Pt reports Right inguinal pain radiating to her Right thigh since surgery. Pain is daily and is exacerbated by walking. Pain does not interfere with her activities of daily living. She does not take any pain medication. She does not take any pain medication. Denies inguinal bulge.  She denies nausea vomiting or fever.

## 2021-11-02 NOTE — ASSESSMENT
[FreeTextEntry1] : I, Anna Garrett NP, attest I was present throughout the visit. \par \par In summary the patient is 3-month status post elective right inguinal hernia repair with mesh.  She has discomfort and numbness in the area of her incision.  She has discomfort that radiates down her right leg.  There is no sign of infection or recurrent hernia on exam.  I ordered a CT of the pelvis to evaluate further.  I explained that numbness after inguinal hernia surgery can sometimes take months to improve.  If her pain persists and there is no sign of surgical problem on CT I will refer her to pain management if her pain is not improving.  I also recommended a trial of nonsteroidal anti-inflammatories

## 2021-11-04 ENCOUNTER — RESULT REVIEW (OUTPATIENT)
Age: 86
End: 2021-11-04

## 2021-11-18 ENCOUNTER — OUTPATIENT (OUTPATIENT)
Dept: OUTPATIENT SERVICES | Facility: HOSPITAL | Age: 86
LOS: 1 days | End: 2021-11-18
Payer: MEDICARE

## 2021-11-18 ENCOUNTER — APPOINTMENT (OUTPATIENT)
Dept: CT IMAGING | Facility: CLINIC | Age: 86
End: 2021-11-18
Payer: MEDICARE

## 2021-11-18 DIAGNOSIS — T14.8XXA OTHER INJURY OF UNSPECIFIED BODY REGION, INITIAL ENCOUNTER: Chronic | ICD-10-CM

## 2021-11-18 DIAGNOSIS — Z98.41 CATARACT EXTRACTION STATUS, RIGHT EYE: Chronic | ICD-10-CM

## 2021-11-18 DIAGNOSIS — Z90.49 ACQUIRED ABSENCE OF OTHER SPECIFIED PARTS OF DIGESTIVE TRACT: Chronic | ICD-10-CM

## 2021-11-18 DIAGNOSIS — Z98.890 OTHER SPECIFIED POSTPROCEDURAL STATES: Chronic | ICD-10-CM

## 2021-11-18 DIAGNOSIS — Z90.710 ACQUIRED ABSENCE OF BOTH CERVIX AND UTERUS: Chronic | ICD-10-CM

## 2021-11-18 DIAGNOSIS — Z09 ENCOUNTER FOR FOLLOW-UP EXAMINATION AFTER COMPLETED TREATMENT FOR CONDITIONS OTHER THAN MALIGNANT NEOPLASM: ICD-10-CM

## 2021-11-18 PROCEDURE — 72193 CT PELVIS W/DYE: CPT | Mod: 26,MH

## 2021-11-18 PROCEDURE — 82565 ASSAY OF CREATININE: CPT

## 2021-11-18 PROCEDURE — 72193 CT PELVIS W/DYE: CPT

## 2021-12-01 ENCOUNTER — APPOINTMENT (OUTPATIENT)
Dept: UROLOGY | Facility: CLINIC | Age: 86
End: 2021-12-01

## 2021-12-07 ENCOUNTER — APPOINTMENT (OUTPATIENT)
Dept: SURGERY | Facility: CLINIC | Age: 86
End: 2021-12-07
Payer: MEDICARE

## 2021-12-07 VITALS
OXYGEN SATURATION: 99 % | DIASTOLIC BLOOD PRESSURE: 52 MMHG | RESPIRATION RATE: 16 BRPM | TEMPERATURE: 97.8 F | SYSTOLIC BLOOD PRESSURE: 162 MMHG | HEART RATE: 75 BPM

## 2021-12-07 PROCEDURE — 99212 OFFICE O/P EST SF 10 MIN: CPT

## 2021-12-07 NOTE — PHYSICAL EXAM
[de-identified] : Soft, nontender, right groin incision healed without evidence of infection or persistent hernia.

## 2021-12-07 NOTE — HISTORY OF PRESENT ILLNESS
[de-identified] : Lexis is a 86 year old female here for follow up visit, surgical site discomfort. She is s/p right inguinal hernia repair with mesh 8/12/21.\par \par Last seen on 11/02/2021,She has discomfort and numbness in the area of her incision. She has discomfort that radiates down her right leg. There is no sign of infection or recurrent hernia on exam. I ordered a CT of the pelvis to evaluate further. I explained that numbness after inguinal hernia surgery can sometimes take months to improve. If her pain persists and there is no sign of surgical problem on CT I will refer her to pain management if her pain is not improving. I also recommended a trial of nonsteroidal anti-inflammatories. \par \par CT scan A & P from 11/18/2021 demonstrated  very mild stranding changes appreciated surrounding several diverticuli in the ascending colon. No abscess or free intraperitoneal air. Findings are suggestive of mild acute diverticulitis. Status post interval right inguinal hernia repair. Mild postsurgical changes noted. No fluid collection.  She was started on a 2-week course of oral antibiotics.  Today she denies abdominal pain.  Her last colonoscopy was in 2020\par \par Today pt reports feeling well, no pain. Pt reports occasional discomfort and stiffness at incision site and occasional cramps radiating down the back of her right leg, improvement in discomfort since last visit. Pt reports taking Arnica for pain with relief. Formed BMs daily/ every other day. Pt reports good appetite, no nausea, no vomiting.

## 2021-12-08 ENCOUNTER — APPOINTMENT (OUTPATIENT)
Dept: INTERNAL MEDICINE | Facility: CLINIC | Age: 86
End: 2021-12-08

## 2021-12-09 ENCOUNTER — APPOINTMENT (OUTPATIENT)
Dept: INTERNAL MEDICINE | Facility: CLINIC | Age: 86
End: 2021-12-09
Payer: MEDICARE

## 2021-12-09 VITALS
HEIGHT: 59 IN | BODY MASS INDEX: 22.78 KG/M2 | TEMPERATURE: 97.6 F | DIASTOLIC BLOOD PRESSURE: 70 MMHG | WEIGHT: 113 LBS | SYSTOLIC BLOOD PRESSURE: 152 MMHG | OXYGEN SATURATION: 97 % | HEART RATE: 77 BPM

## 2021-12-09 PROCEDURE — 99212 OFFICE O/P EST SF 10 MIN: CPT | Mod: 25

## 2021-12-09 PROCEDURE — 36415 COLL VENOUS BLD VENIPUNCTURE: CPT

## 2021-12-09 NOTE — HISTORY OF PRESENT ILLNESS
[FreeTextEntry8] : c/o right sciatica pain since few days on and off. No numbness or tingling sensation.

## 2021-12-09 NOTE — PHYSICAL EXAM
[Well Nourished] : well nourished [Well Developed] : well developed [Pedal Pulses Present] : the pedal pulses are present [No Joint Swelling] : no joint swelling [Alert and Oriented x3] : oriented to person, place, and time [Normal Mood] : the mood was normal [de-identified] : straight leg test negative.

## 2021-12-14 LAB
ALBUMIN SERPL ELPH-MCNC: 4.2 G/DL
ALP BLD-CCNC: 66 U/L
ALT SERPL-CCNC: 13 U/L
ANION GAP SERPL CALC-SCNC: 10 MMOL/L
AST SERPL-CCNC: 17 U/L
BASOPHILS # BLD AUTO: 0.06 K/UL
BASOPHILS NFR BLD AUTO: 0.9 %
BILIRUB SERPL-MCNC: 0.3 MG/DL
BUN SERPL-MCNC: 20 MG/DL
CALCIUM SERPL-MCNC: 9.9 MG/DL
CHLORIDE SERPL-SCNC: 103 MMOL/L
CO2 SERPL-SCNC: 24 MMOL/L
CREAT SERPL-MCNC: 0.9 MG/DL
CRP SERPL-MCNC: <3 MG/L
EOSINOPHIL # BLD AUTO: 0.13 K/UL
EOSINOPHIL NFR BLD AUTO: 1.9 %
ERYTHROCYTE [SEDIMENTATION RATE] IN BLOOD BY WESTERGREN METHOD: 21 MM/HR
GLUCOSE SERPL-MCNC: 98 MG/DL
HCT VFR BLD CALC: 43.2 %
HGB BLD-MCNC: 13.7 G/DL
IMM GRANULOCYTES NFR BLD AUTO: 0.3 %
LYMPHOCYTES # BLD AUTO: 1.8 K/UL
LYMPHOCYTES NFR BLD AUTO: 25.7 %
MAN DIFF?: NORMAL
MCHC RBC-ENTMCNC: 28.3 PG
MCHC RBC-ENTMCNC: 31.7 GM/DL
MCV RBC AUTO: 89.3 FL
MONOCYTES # BLD AUTO: 0.6 K/UL
MONOCYTES NFR BLD AUTO: 8.6 %
NEUTROPHILS # BLD AUTO: 4.4 K/UL
NEUTROPHILS NFR BLD AUTO: 62.6 %
PLATELET # BLD AUTO: 176 K/UL
POTASSIUM SERPL-SCNC: 4.5 MMOL/L
PROT SERPL-MCNC: 7.2 G/DL
RBC # BLD: 4.84 M/UL
RBC # FLD: 13.6 %
SODIUM SERPL-SCNC: 137 MMOL/L
WBC # FLD AUTO: 7.01 K/UL

## 2021-12-28 ENCOUNTER — RX RENEWAL (OUTPATIENT)
Age: 86
End: 2021-12-28

## 2021-12-30 ENCOUNTER — TRANSCRIPTION ENCOUNTER (OUTPATIENT)
Age: 86
End: 2021-12-30

## 2022-01-14 ENCOUNTER — APPOINTMENT (OUTPATIENT)
Dept: OPHTHALMOLOGY | Facility: CLINIC | Age: 87
End: 2022-01-14
Payer: MEDICARE

## 2022-01-14 ENCOUNTER — NON-APPOINTMENT (OUTPATIENT)
Age: 87
End: 2022-01-14

## 2022-01-14 PROCEDURE — 92134 CPTRZ OPH DX IMG PST SGM RTA: CPT

## 2022-01-14 PROCEDURE — 92136 OPHTHALMIC BIOMETRY: CPT | Mod: LT

## 2022-01-14 PROCEDURE — 92014 COMPRE OPH EXAM EST PT 1/>: CPT

## 2022-01-20 ENCOUNTER — APPOINTMENT (OUTPATIENT)
Dept: GASTROENTEROLOGY | Facility: CLINIC | Age: 87
End: 2022-01-20

## 2022-03-08 ENCOUNTER — NON-APPOINTMENT (OUTPATIENT)
Age: 87
End: 2022-03-08

## 2022-03-08 ENCOUNTER — APPOINTMENT (OUTPATIENT)
Dept: INTERNAL MEDICINE | Facility: CLINIC | Age: 87
End: 2022-03-08
Payer: MEDICARE

## 2022-03-08 VITALS
OXYGEN SATURATION: 98 % | HEART RATE: 76 BPM | WEIGHT: 112 LBS | TEMPERATURE: 97.4 F | DIASTOLIC BLOOD PRESSURE: 70 MMHG | SYSTOLIC BLOOD PRESSURE: 138 MMHG

## 2022-03-08 PROCEDURE — 93000 ELECTROCARDIOGRAM COMPLETE: CPT | Mod: 59

## 2022-03-08 PROCEDURE — 99213 OFFICE O/P EST LOW 20 MIN: CPT | Mod: 25

## 2022-03-08 RX ORDER — AMOXICILLIN AND CLAVULANATE POTASSIUM 875; 125 MG/1; MG/1
875-125 TABLET, COATED ORAL
Qty: 20 | Refills: 2 | Status: DISCONTINUED | COMMUNITY
Start: 2021-11-22 | End: 2022-03-08

## 2022-03-08 RX ORDER — ALPRAZOLAM 0.25 MG/1
0.25 TABLET ORAL
Qty: 30 | Refills: 0 | Status: DISCONTINUED | COMMUNITY
Start: 2021-05-17 | End: 2022-03-08

## 2022-03-08 RX ORDER — NITROFURANTOIN MACROCRYSTALS 100 MG/1
100 CAPSULE ORAL
Qty: 30 | Refills: 3 | Status: DISCONTINUED | COMMUNITY
Start: 2021-09-01 | End: 2022-03-08

## 2022-03-08 NOTE — HISTORY OF PRESENT ILLNESS
[Coronary Artery Disease] : coronary artery disease [No Pertinent Pulmonary History] : no history of asthma, COPD, sleep apnea, or smoking [No Adverse Anesthesia Reaction] : no adverse anesthesia reaction in self or family member [Aortic Stenosis] : no aortic stenosis [Atrial Fibrillation] : no atrial fibrillation [Recent Myocardial Infarction] : no recent myocardial infarction [Implantable Device/Pacemaker] : no implantable device/pacemaker [Asthma] : no asthma [COPD] : no COPD [Sleep Apnea] : no sleep apnea [Smoker] : not a smoker [Family Member] : no family member with adverse anesthesia reaction/sudden death [Self] : no previous adverse anesthesia reaction [Chronic Anticoagulation] : no chronic anticoagulation [Chronic Kidney Disease] : no chronic kidney disease [Diabetes] : no diabetes [FreeTextEntry1] : Left eye cataract  [FreeTextEntry2] : 3/15/22 [FreeTextEntry3] : Dr.Matthew Poole [FreeTextEntry4] : Pt reports feeling well.

## 2022-03-08 NOTE — RESULTS/DATA
[] : results reviewed [de-identified] : wnl [de-identified] : wnl [de-identified] : SR,no acute ischemia

## 2022-03-10 ENCOUNTER — APPOINTMENT (OUTPATIENT)
Dept: PULMONOLOGY | Facility: CLINIC | Age: 87
End: 2022-03-10
Payer: MEDICARE

## 2022-03-10 ENCOUNTER — NON-APPOINTMENT (OUTPATIENT)
Age: 87
End: 2022-03-10

## 2022-03-10 VITALS
HEART RATE: 84 BPM | DIASTOLIC BLOOD PRESSURE: 60 MMHG | BODY MASS INDEX: 23.09 KG/M2 | RESPIRATION RATE: 16 BRPM | WEIGHT: 110 LBS | OXYGEN SATURATION: 97 % | SYSTOLIC BLOOD PRESSURE: 120 MMHG | HEIGHT: 58 IN | TEMPERATURE: 97.3 F

## 2022-03-10 DIAGNOSIS — Z77.22 CONTACT WITH AND (SUSPECTED) EXPOSURE TO ENVIRONMENTAL TOBACCO SMOKE (ACUTE) (CHRONIC): ICD-10-CM

## 2022-03-10 DIAGNOSIS — Z80.0 FAMILY HISTORY OF MALIGNANT NEOPLASM OF DIGESTIVE ORGANS: ICD-10-CM

## 2022-03-10 DIAGNOSIS — Z82.49 FAMILY HISTORY OF ISCHEMIC HEART DISEASE AND OTHER DISEASES OF THE CIRCULATORY SYSTEM: ICD-10-CM

## 2022-03-10 DIAGNOSIS — J45.909 UNSPECIFIED ASTHMA, UNCOMPLICATED: ICD-10-CM

## 2022-03-10 DIAGNOSIS — Z82.3 FAMILY HISTORY OF STROKE: ICD-10-CM

## 2022-03-10 DIAGNOSIS — K57.92 DIVERTICULITIS OF INTESTINE, PART UNSPECIFIED, W/OUT PERFORATION OR ABSCESS W/OUT BLEEDING: ICD-10-CM

## 2022-03-10 PROCEDURE — 99204 OFFICE O/P NEW MOD 45 MIN: CPT | Mod: 25

## 2022-03-10 PROCEDURE — 94727 GAS DIL/WSHOT DETER LNG VOL: CPT

## 2022-03-10 PROCEDURE — 94618 PULMONARY STRESS TESTING: CPT

## 2022-03-10 PROCEDURE — 94729 DIFFUSING CAPACITY: CPT

## 2022-03-10 PROCEDURE — 94010 BREATHING CAPACITY TEST: CPT

## 2022-03-10 PROCEDURE — 71046 X-RAY EXAM CHEST 2 VIEWS: CPT

## 2022-03-10 PROCEDURE — 95012 NITRIC OXIDE EXP GAS DETER: CPT

## 2022-03-10 RX ORDER — CEFDINIR 300 MG/1
300 CAPSULE ORAL
Qty: 20 | Refills: 0 | Status: DISCONTINUED | COMMUNITY
Start: 2021-10-21

## 2022-03-10 RX ORDER — CLOTRIMAZOLE AND BETAMETHASONE DIPROPIONATE 10; .5 MG/G; MG/G
1-0.05 CREAM TOPICAL
Qty: 30 | Refills: 0 | Status: DISCONTINUED | COMMUNITY
Start: 2021-12-09

## 2022-03-10 NOTE — ADDENDUM
[FreeTextEntry1] : **Amended by Justen Arellano acting as a scribe for Dr. Bentley Hall on 03/10/2022** \par \par Documented by Fareed Kern acting as a scribe for Dr. Bentley Hall on 03/10/2022 \par \par All medical record entries made by the Scribe were at my, Dr. Bentley Hall's, direction and personally dictated by me on 03/10/2022 . I have reviewed the chart and agree that the record accurately reflects my personal performance of the history, physical exam, assessment and plan. I have also personally directed, reviewed, and agree with the discharge instructions

## 2022-03-10 NOTE — REASON FOR VISIT
[Initial] : an initial visit [TextBox_44] : SOB, mild to moderate persistent asthma, allergies, GERD, idiopathic urticaria, poor sleep \par

## 2022-03-10 NOTE — HISTORY OF PRESENT ILLNESS
[TextBox_4] : Ms. APARICIO is a 86 year old female with a history of originally form [place], secondary smoking exposure, CAD, diverticulitis, dermatographism, GERD, arthritis, osteoporosis, ?OW, low VItamin D, asthma, elevated cholesterol, hepatitis C, kidney stone  presenting to the office today for initial pulmonary evaluation. Her chief complaint is\par \par -she notes prescribed Flovent when asthma was active by prior doctor \par -she notes SOB on exertion \par -she notes cold air, allergies, and URI aggravates asthma \par -she notes asthma symptoms is coughing, wheezing, and SOB \par -she notes allergies active mostly during the spring \par -she notes allergy symptoms is itchy eyes, rhinitis\par -she notes acid reflux is controlled with omeprazole\par -she notes lack of sleep and gets 3 hrs of sleep \par -she notes prescribed Xanax which did not improve length of sleep \par -she notes difficulty staying asleep \par -she notes weight is stable \par -she notes whole body itches when allergies is active and is not improved with prescribed cream \par -she notes walking for exercise \par \par -she denies any headaches, nausea, vomiting, fever, chills, sweats, chest pain, chest pressure, diarrhea, constipation, dysphagia, dizziness, leg swelling, leg pain, itchy ears, heartburn, sour taste in the mouth, myalgias or arthralgias.

## 2022-03-10 NOTE — ASSESSMENT
[FreeTextEntry1] : Ms. APARICIO is a 86 year old female with a history of originally form [place], secondary smoking exposure, CAD, diverticulitis, dermatographism, GERD, arthritis, osteoporosis, low VItamin D, asthma, elevated cholesterol, hepatitis C, kidney stone  presenting to the office today for initial pulmonary evaluation for SOB, mild to moderate persistent asthma, allergies, GERD, idiopathic urticaria, poor sleep \par \par Her shortness of breath is multifactorial due to:\par -poor mechanics of breathing \par -out of shape \par -pulmonary disease\par    -mild to moderate persistent asthma \par -?cardiac disease \par \par problem 1: mild to moderate persistent asthma \par -add Breo Ellipta 200 at 1 inhalation QHS QD \par -add Albuterol via nebulizer, Q6H\par -add Singulair 10 mg QHS \par -Asthma is  believed to be caused by inherited (genetic) and environmental factor, but its exact cause is unknown. Asthma may be triggered by allergens, lung infections, or irritants in the air. Asthma triggers are different for each person \par -Inhaler technique reviewed as well as oral hygiene techniques reviewed with patient. Avoidance of cold air, extremes of temperature, rescue inhaler should be used before exercise. Order of medication reviewed with patient. Recommended use of a cool mist humidifier in the bedroom. \par \par problem 2: allergies/ sinus \par -continue Allegra 180 mg QAM \par -get Blood work to include: asthma panel, food IgE panel, IgE level, eosinophil level, vitamin D level \par -Environmental measures for allergies were encouraged including mattress and pillow covers, air purifier, and environmental controls. \par \par problem 3: GERD \par -continue Omeprazole 20 mg QAM, pre-meal \par -Rule of 2s: avoid eating too much, eating too late, eating too spicy, eating two hours before bed.\par -Things to avoid including overeating, spicy foods, tight clothing, eating within three hours of bed, this list is not all inclusive. \par -For treatment of reflux, possible options discussed including diet control, H2 blockers, PPIs, as well as coating motility agents discussed as treatment options. Timing of meals and proximity of last meal to sleep were discussed. If symptoms persist, a formal gastrointestinal evaluation is needed. \par \par Problem 4:idiopathic urticaria\par -compete IgE level blood test \par \par Problem 5 poor sleep ?CHRIS\par -add hydroxyzine 10mg QHS\par -complete home sleep study \par Sleep apnea is associated with adverse clinical consequences which can affect most organ systems.  Cardiovascular disease risk includes arrhythmias, atrial fibrillation, hypertension, coronary artery disease, and stroke. Metabolic disorders include diabetes type 2, non-alcoholic fatty liver disease. Mood disorder especially depression; and cognitive decline especially in the elderly. Associations with  chronic reflux/Reich’s esophagus some but not all inclusive. \par -Reasons  include arousal consistent with hypopnea; respiratory events most prominent in REM sleep or supine position; therefore sleep staging and body position are important for accurate diagnosis and estimation of AHI. \par \par Problem 6 Low Vitamin D\par -continue Vitamin D supplements \par Low vitamin D levels have been associated with asthma exacerbations and increased allergic symptoms. The goal based on recent information is maintaining levels between 50-70 and low normal is 30. Recommended 50,000 units every two weeks to once a month depending on the level. \par problem 6: cardiac disease\par -recommended to continue to follow up with Cardiologist \par \par problem 7: poor breathing mechanics\par -Proper breathing techniques were reviewed with an emphasis of exhalation. Patient instructed to breath in for 1 second and out for four seconds. Patient was encouraged to not talk while walking. \par \par problem 8: out of shape \par -Weight loss, exercise, and diet control were discussed and are highly encouraged. Treatment options were given such as, aqua therapy, and contacting a nutritionist. Recommended to use the elliptical, stationary bike, less use of treadmill. Mindful eating was explained to the patient Obesity is associated with worsening asthma, shortness of breath, and potential for cardiac disease, diabetes, and other underlying medical conditions\par \par problem 9: health maintenance \par -recommended yearly flu shot after October 15\par -recommended strep pneumonia vaccines: Prevnar-13 vaccine, followed by Pneumo vaccine 23 one year following after 65\par -recommended early intervention for Upper Respiratory Infections (URIs)\par -recommended regular osteoporosis evaluations\par -recommended early dermatological evaluations\par -recommended after the age of 50 to the age of 70, colonoscopy every 5 years\par \par F/U in 6-8 weeks.\par She is encouraged to call with any changes, concerns, or questions\par

## 2022-03-10 NOTE — PROCEDURE
[FreeTextEntry1] : CXR reveals a normal sized heart; hyperinflation \par \par PFT revealed mild obstructive dysfunction, with a FEV1 of 1.11L,which is 89% of predicted with a normal flow volume loop \par \par FENO was 9; a normal value being less than 25\par Fractional exhaled nitric oxide (FENO) is regarded as a simple, noninvasive method for assessing eosinophilic airway inflammation. Produced by a variety of cells within the lung, nitric oxide (NO) concentrations are generally low in healthy individuals. However, high concentrations of NO appear to be involved in nonspecific host defense mechanisms and chronic inflammatory diseases such as asthma. The American Thoracic Society (ATS) therefore has recommended using FENO to aid in the diagnosis and monitoring of eosinophilic airway inflammation and asthma, and for identifying steroid responsive individuals whose chronic respiratory symptoms may be caused by airway inflammation. \par \par 6 minute walk test reveals a low saturation of 96% with no evidence of dyspnea or fatigue; walked 326 meters. Patient stopped before 6 minutes due to leg pain

## 2022-03-11 ENCOUNTER — TRANSCRIPTION ENCOUNTER (OUTPATIENT)
Age: 87
End: 2022-03-11

## 2022-03-15 ENCOUNTER — APPOINTMENT (OUTPATIENT)
Dept: OPHTHALMOLOGY | Facility: AMBULATORY SURGERY CENTER | Age: 87
End: 2022-03-15
Payer: MEDICARE

## 2022-03-15 ENCOUNTER — NON-APPOINTMENT (OUTPATIENT)
Age: 87
End: 2022-03-15

## 2022-03-15 PROCEDURE — 66984 XCAPSL CTRC RMVL W/O ECP: CPT | Mod: LT

## 2022-03-16 ENCOUNTER — APPOINTMENT (OUTPATIENT)
Dept: OPHTHALMOLOGY | Facility: CLINIC | Age: 87
End: 2022-03-16
Payer: MEDICARE

## 2022-03-16 ENCOUNTER — NON-APPOINTMENT (OUTPATIENT)
Age: 87
End: 2022-03-16

## 2022-03-16 PROCEDURE — 99024 POSTOP FOLLOW-UP VISIT: CPT

## 2022-03-18 ENCOUNTER — APPOINTMENT (OUTPATIENT)
Dept: ENDOCRINOLOGY | Facility: CLINIC | Age: 87
End: 2022-03-18
Payer: MEDICARE

## 2022-03-18 PROCEDURE — 96372 THER/PROPH/DIAG INJ SC/IM: CPT

## 2022-03-18 RX ORDER — DENOSUMAB 60 MG/ML
60 INJECTION SUBCUTANEOUS
Qty: 1 | Refills: 0 | Status: COMPLETED | OUTPATIENT
Start: 2022-03-18

## 2022-03-18 RX ADMIN — DENOSUMAB 0 MG/ML: 60 INJECTION SUBCUTANEOUS at 00:00

## 2022-03-23 ENCOUNTER — APPOINTMENT (OUTPATIENT)
Dept: OPHTHALMOLOGY | Facility: CLINIC | Age: 87
End: 2022-03-23
Payer: MEDICARE

## 2022-03-23 ENCOUNTER — NON-APPOINTMENT (OUTPATIENT)
Age: 87
End: 2022-03-23

## 2022-03-23 PROCEDURE — 99024 POSTOP FOLLOW-UP VISIT: CPT

## 2022-04-12 ENCOUNTER — APPOINTMENT (OUTPATIENT)
Dept: PULMONOLOGY | Facility: CLINIC | Age: 87
End: 2022-04-12
Payer: MEDICARE

## 2022-04-12 VITALS
OXYGEN SATURATION: 98 % | WEIGHT: 112 LBS | BODY MASS INDEX: 24.16 KG/M2 | SYSTOLIC BLOOD PRESSURE: 140 MMHG | HEART RATE: 80 BPM | RESPIRATION RATE: 16 BRPM | TEMPERATURE: 97.6 F | HEIGHT: 57 IN | DIASTOLIC BLOOD PRESSURE: 70 MMHG

## 2022-04-12 PROCEDURE — 71046 X-RAY EXAM CHEST 2 VIEWS: CPT

## 2022-04-12 PROCEDURE — 99214 OFFICE O/P EST MOD 30 MIN: CPT | Mod: 25

## 2022-04-12 RX ORDER — FLUTICASONE FUROATE AND VILANTEROL TRIFENATATE 200; 25 UG/1; UG/1
200-25 POWDER RESPIRATORY (INHALATION) DAILY
Qty: 3 | Refills: 1 | Status: DISCONTINUED | COMMUNITY
Start: 2022-03-10 | End: 2022-04-12

## 2022-04-12 NOTE — REASON FOR VISIT
[Follow-Up] : a follow-up visit [TextBox_44] : SOB, mild to moderate persistent asthma, allergies, GERD, idiopathic urticaria, poor sleep \par

## 2022-04-12 NOTE — HISTORY OF PRESENT ILLNESS
[TextBox_4] : Ms. APARICIO is a 86 year old female with a history of originally form [place], secondary smoking exposure, CAD, diverticulitis, dermatographism, GERD, arthritis, osteoporosis, ?OW, low VItamin D, asthma, elevated cholesterol, hepatitis C, kidney stone  presenting to the office today for a follow-up pulmonary evaluation. Her chief complaint is\par -she notes she has been coughing\par -she notes she wants to change back to Flovent because her current inhaler made her cough\par -she notes the cough started over a week ago but she did not get sick\par -she notes getting chills sometimes\par -she notes she has been walking a little bit but not much currently due to the weather\par -she notes she has some constipation but is taking something natural for it that is helping\par -she notes s/p cataract surgery 4 weeks ago\par -she notes she has been having some allergies\par \par -patient denies any headaches, nausea, vomiting, fever, sweats, chest pain, chest pressure, palpitations, wheezing, fatigue, diarrhea, dysphagia, myalgias, dizziness, leg swelling, leg pain, itchy eyes, itchy ears, heartburn, reflux or sour taste in the mouth

## 2022-04-12 NOTE — ADDENDUM
[FreeTextEntry1] : Documented by Justen Arellano acting as a scribe for Dr. Bentley Hall on 04/12/2022\par \par All medical record entries made by the scribe were at my, Dr. Bentley Hall's, direction and personally dictated by me on 04/12/2022. I have reviewed the chart and agree that the record accurately reflects my personal performance of the history, physical exam, assessment, and plan. I have also personally directed, reviewed, and agree with the discharge instructions.

## 2022-04-12 NOTE — PROCEDURE
[FreeTextEntry1] : CXR revealed a normal sized heart; there was no evidence of infiltrate or effusion. Calcified trachea

## 2022-04-12 NOTE — ASSESSMENT
[FreeTextEntry1] : Ms. APARICIO is a 86 year old female with a history of originally form [place], secondary smoking exposure, CAD, diverticulitis, dermatographism, GERD, arthritis, osteoporosis, low VItamin D, asthma, elevated cholesterol, hepatitis C, kidney stone  presenting to the office today for a follow-up pulmonary evaluation for SOB, mild to moderate persistent asthma, allergies, GERD, idiopathic urticaria, poor sleep - active asthma\par \par Her shortness of breath is multifactorial due to:\par -poor mechanics of breathing \par -out of shape \par -pulmonary disease\par    -mild to moderate persistent asthma \par -?cardiac disease \par \par problem 1: mild to moderate persistent asthma \par -Add Bevespi 2 puffs BID (first)\par -Add Flovent  BID (second)\par -continue Albuterol via nebulizer, Q6H\par -continue Singulair 10 mg QHS \par -Asthma is  believed to be caused by inherited (genetic) and environmental factor, but its exact cause is unknown. Asthma may be triggered by allergens, lung infections, or irritants in the air. Asthma triggers are different for each person \par -Inhaler technique reviewed as well as oral hygiene techniques reviewed with patient. Avoidance of cold air, extremes of temperature, rescue inhaler should be used before exercise. Order of medication reviewed with patient. Recommended use of a cool mist humidifier in the bedroom. \par \par problem 2: allergies/ sinus \par -continue Allegra 180 mg QAM \par -get Blood work to include: asthma panel, food IgE panel, IgE level, eosinophil level, vitamin D level \par -Environmental measures for allergies were encouraged including mattress and pillow covers, air purifier, and environmental controls. \par \par problem 3: GERD \par -continue Omeprazole 20 mg QAM, pre-meal \par -Rule of 2s: avoid eating too much, eating too late, eating too spicy, eating two hours before bed.\par -Things to avoid including overeating, spicy foods, tight clothing, eating within three hours of bed, this list is not all inclusive. \par -For treatment of reflux, possible options discussed including diet control, H2 blockers, PPIs, as well as coating motility agents discussed as treatment options. Timing of meals and proximity of last meal to sleep were discussed. If symptoms persist, a formal gastrointestinal evaluation is needed. \par \par Problem 4:idiopathic urticaria\par -compete IgE level blood test \par \par Problem 5 poor sleep ?CHRIS\par -continue hydroxyzine 10mg QHS\par -complete home sleep study \par Sleep apnea is associated with adverse clinical consequences which can affect most organ systems.  Cardiovascular disease risk includes arrhythmias, atrial fibrillation, hypertension, coronary artery disease, and stroke. Metabolic disorders include diabetes type 2, non-alcoholic fatty liver disease. Mood disorder especially depression; and cognitive decline especially in the elderly. Associations with  chronic reflux/Reich’s esophagus some but not all inclusive. \par -Reasons  include arousal consistent with hypopnea; respiratory events most prominent in REM sleep or supine position; therefore sleep staging and body position are important for accurate diagnosis and estimation of AHI. \par \par Problem 6 Low Vitamin D\par -continue Vitamin D supplements \par Low vitamin D levels have been associated with asthma exacerbations and increased allergic symptoms. The goal based on recent information is maintaining levels between 50-70 and low normal is 30. Recommended 50,000 units every two weeks to once a month depending on the level. \par problem 6: cardiac disease\par -recommended to continue to follow up with Cardiologist \par \par problem 7: poor breathing mechanics\par -Recommended Wim Hof and Buteyko breathing techniques \par -Proper breathing techniques were reviewed with an emphasis of exhalation. Patient instructed to breath in for 1 second and out for four seconds. Patient was encouraged to not talk while walking. \par \par problem 8: out of shape \par -Weight loss, exercise, and diet control were discussed and are highly encouraged. Treatment options were given such as, aqua therapy, and contacting a nutritionist. Recommended to use the elliptical, stationary bike, less use of treadmill. Mindful eating was explained to the patient Obesity is associated with worsening asthma, shortness of breath, and potential for cardiac disease, diabetes, and other underlying medical conditions\par \par problem 9: health maintenance \par -s/p COVID-19 vaccine x3 \par -recommended yearly flu shot after October 15\par -recommended strep pneumonia vaccines: Prevnar-13 vaccine, followed by Pneumo vaccine 23 one year following after 65\par -recommended early intervention for Upper Respiratory Infections (URIs)\par -recommended regular osteoporosis evaluations\par -recommended early dermatological evaluations\par -recommended after the age of 50 to the age of 70, colonoscopy every 5 years\par \par F/U in 6-8 weeks.\par She is encouraged to call with any changes, concerns, or questions\par

## 2022-04-15 ENCOUNTER — APPOINTMENT (OUTPATIENT)
Dept: OPHTHALMOLOGY | Facility: CLINIC | Age: 87
End: 2022-04-15
Payer: MEDICARE

## 2022-04-15 ENCOUNTER — NON-APPOINTMENT (OUTPATIENT)
Age: 87
End: 2022-04-15

## 2022-04-15 PROCEDURE — 99024 POSTOP FOLLOW-UP VISIT: CPT

## 2022-04-18 ENCOUNTER — LABORATORY RESULT (OUTPATIENT)
Age: 87
End: 2022-04-18

## 2022-04-18 LAB
25(OH)D3 SERPL-MCNC: 90.8 NG/ML
BASOPHILS # BLD AUTO: 0.06 K/UL
BASOPHILS NFR BLD AUTO: 0.8 %
EOSINOPHIL # BLD AUTO: 0.16 K/UL
EOSINOPHIL NFR BLD AUTO: 2 %
HCT VFR BLD CALC: 46.6 %
HGB BLD-MCNC: 14.8 G/DL
IMM GRANULOCYTES NFR BLD AUTO: 0.5 %
LYMPHOCYTES # BLD AUTO: 1.71 K/UL
LYMPHOCYTES NFR BLD AUTO: 21.8 %
MAN DIFF?: NORMAL
MCHC RBC-ENTMCNC: 28.8 PG
MCHC RBC-ENTMCNC: 31.8 GM/DL
MCV RBC AUTO: 90.7 FL
MONOCYTES # BLD AUTO: 0.66 K/UL
MONOCYTES NFR BLD AUTO: 8.4 %
NEUTROPHILS # BLD AUTO: 5.22 K/UL
NEUTROPHILS NFR BLD AUTO: 66.5 %
PLATELET # BLD AUTO: 205 K/UL
RBC # BLD: 5.14 M/UL
RBC # FLD: 13.2 %
WBC # FLD AUTO: 7.85 K/UL

## 2022-04-19 LAB — 24R-OH-CALCIDIOL SERPL-MCNC: 58 PG/ML

## 2022-04-20 LAB
DEPRECATED GOOSE FEATHER IGE RAST QL: 0
GOOSE FEATHER IGE QN: <0.1 KUA/L
TOTAL IGE SMQN RAST: 29 KU/L

## 2022-04-21 LAB
A ALTERNATA IGE QN: <0.1 KUA/L
A ALTERNATA IGE QN: <0.1 KUA/L
A FUMIGATUS IGE QN: <0.1 KUA/L
A FUMIGATUS IGE QN: <0.1 KUA/L
BERMUDA GRASS IGE QN: <0.1 KUA/L
BOXELDER IGE QN: <0.1 KUA/L
C ALBICANS IGE QN: <0.1 KUA/L
C HERBARUM IGE QN: <0.1 KUA/L
C HERBARUM IGE QN: <0.1 KUA/L
CALIF WALNUT IGE QN: <0.1 KUA/L
CAT DANDER IGE QN: <0.1 KUA/L
CAT DANDER IGE QN: <0.1 KUA/L
CLAM IGE QN: 0.14 KUA/L
CMN PIGWEED IGE QN: <0.1 KUA/L
CODFISH IGE QN: <0.1 KUA/L
COMMON RAGWEED IGE QN: <0.1 KUA/L
COMMON RAGWEED IGE QN: <0.1 KUA/L
CORN IGE QN: <0.1 KUA/L
COTTONWOOD IGE QN: <0.1 KUA/L
COW MILK IGE QN: <0.1 KUA/L
D FARINAE IGE QN: <0.1 KUA/L
D FARINAE IGE QN: <0.1 KUA/L
D PTERONYSS IGE QN: <0.1 KUA/L
D PTERONYSS IGE QN: <0.1 KUA/L
DEPRECATED A ALTERNATA IGE RAST QL: 0
DEPRECATED A ALTERNATA IGE RAST QL: 0
DEPRECATED A FUMIGATUS IGE RAST QL: 0
DEPRECATED A FUMIGATUS IGE RAST QL: 0
DEPRECATED BERMUDA GRASS IGE RAST QL: 0
DEPRECATED BOXELDER IGE RAST QL: 0
DEPRECATED C ALBICANS IGE RAST QL: 0
DEPRECATED C HERBARUM IGE RAST QL: 0
DEPRECATED C HERBARUM IGE RAST QL: 0
DEPRECATED CAT DANDER IGE RAST QL: 0
DEPRECATED CAT DANDER IGE RAST QL: 0
DEPRECATED CLAM IGE RAST QL: NORMAL
DEPRECATED CODFISH IGE RAST QL: 0
DEPRECATED COMMON PIGWEED IGE RAST QL: 0
DEPRECATED COMMON RAGWEED IGE RAST QL: 0
DEPRECATED COMMON RAGWEED IGE RAST QL: 0
DEPRECATED CORN IGE RAST QL: 0
DEPRECATED COTTONWOOD IGE RAST QL: 0
DEPRECATED COW MILK IGE RAST QL: 0
DEPRECATED D FARINAE IGE RAST QL: 0
DEPRECATED D FARINAE IGE RAST QL: 0
DEPRECATED D PTERONYSS IGE RAST QL: 0
DEPRECATED D PTERONYSS IGE RAST QL: 0
DEPRECATED DOG DANDER IGE RAST QL: 0
DEPRECATED DOG DANDER IGE RAST QL: 0
DEPRECATED DUCK FEATHER IGE RAST QL: 0
DEPRECATED EGG WHITE IGE RAST QL: 0
DEPRECATED GOOSEFOOT IGE RAST QL: 0
DEPRECATED LONDON PLANE IGE RAST QL: 0
DEPRECATED M RACEMOSUS IGE RAST QL: 0
DEPRECATED MOUSE URINE PROT IGE RAST QL: 0
DEPRECATED MUGWORT IGE RAST QL: 0
DEPRECATED P NOTATUM IGE RAST QL: 0
DEPRECATED PEANUT IGE RAST QL: 0
DEPRECATED RED CEDAR IGE RAST QL: 0
DEPRECATED ROACH IGE RAST QL: NORMAL
DEPRECATED ROACH IGE RAST QL: NORMAL
DEPRECATED SCALLOP IGE RAST QL: <0.1 KUA/L
DEPRECATED SESAME SEED IGE RAST QL: 0
DEPRECATED SHEEP SORREL IGE RAST QL: 0
DEPRECATED SHRIMP IGE RAST QL: NORMAL
DEPRECATED SILVER BIRCH IGE RAST QL: 0
DEPRECATED SOYBEAN IGE RAST QL: 0
DEPRECATED TIMOTHY IGE RAST QL: 0
DEPRECATED TIMOTHY IGE RAST QL: 0
DEPRECATED WALNUT IGE RAST QL: 0
DEPRECATED WHEAT IGE RAST QL: 0
DEPRECATED WHITE ASH IGE RAST QL: 0
DEPRECATED WHITE OAK IGE RAST QL: 0
DEPRECATED WHITE OAK IGE RAST QL: 0
DOG DANDER IGE QN: <0.1 KUA/L
DOG DANDER IGE QN: <0.1 KUA/L
DUCK FEATHER IGE QN: <0.1 KUA/L
EGG WHITE IGE QN: <0.1 KUA/L
GOOSEFOOT IGE QN: <0.1 KUA/L
LONDON PLANE IGE QN: <0.1 KUA/L
M RACEMOSUS IGE QN: <0.1 KUA/L
MOUSE URINE PROT IGE QN: <0.1 KUA/L
MUGWORT IGE QN: <0.1 KUA/L
MULBERRY (T70) CLASS: 0
MULBERRY (T70) CONC: <0.1 KUA/L
P NOTATUM IGE QN: <0.1 KUA/L
PEANUT IGE QN: <0.1 KUA/L
RED CEDAR IGE QN: <0.1 KUA/L
ROACH IGE QN: 0.15 KUA/L
ROACH IGE QN: 0.15 KUA/L
SCALLOP IGE QN: 0
SCALLOP IGE QN: 0.19 KUA/L
SESAME SEED IGE QN: <0.1 KUA/L
SHEEP SORREL IGE QN: <0.1 KUA/L
SILVER BIRCH IGE QN: <0.1 KUA/L
SOYBEAN IGE QN: <0.1 KUA/L
TIMOTHY IGE QN: <0.1 KUA/L
TIMOTHY IGE QN: <0.1 KUA/L
TREE ALLERG MIX1 IGE QL: 0
WALNUT IGE QN: <0.1 KUA/L
WHEAT IGE QN: <0.1 KUA/L
WHITE ASH IGE QN: <0.1 KUA/L
WHITE ELM IGE QN: 0
WHITE ELM IGE QN: <0.1 KUA/L
WHITE OAK IGE QN: <0.1 KUA/L
WHITE OAK IGE QN: <0.1 KUA/L

## 2022-04-26 ENCOUNTER — NON-APPOINTMENT (OUTPATIENT)
Age: 87
End: 2022-04-26

## 2022-05-02 ENCOUNTER — APPOINTMENT (OUTPATIENT)
Dept: INTERNAL MEDICINE | Facility: CLINIC | Age: 87
End: 2022-05-02

## 2022-05-05 ENCOUNTER — NON-APPOINTMENT (OUTPATIENT)
Age: 87
End: 2022-05-05

## 2022-05-13 ENCOUNTER — NON-APPOINTMENT (OUTPATIENT)
Age: 87
End: 2022-05-13

## 2022-05-13 ENCOUNTER — APPOINTMENT (OUTPATIENT)
Dept: PULMONOLOGY | Facility: CLINIC | Age: 87
End: 2022-05-13
Payer: MEDICARE

## 2022-05-13 VITALS
HEIGHT: 57 IN | RESPIRATION RATE: 16 BRPM | HEART RATE: 75 BPM | TEMPERATURE: 97.3 F | WEIGHT: 113 LBS | BODY MASS INDEX: 24.38 KG/M2 | OXYGEN SATURATION: 98 % | DIASTOLIC BLOOD PRESSURE: 60 MMHG | SYSTOLIC BLOOD PRESSURE: 140 MMHG

## 2022-05-13 PROCEDURE — 94640 AIRWAY INHALATION TREATMENT: CPT

## 2022-05-13 PROCEDURE — 99214 OFFICE O/P EST MOD 30 MIN: CPT | Mod: CS,25

## 2022-05-13 RX ORDER — ALBUTEROL SULFATE 2.5 MG/3ML
(2.5 MG/3ML) SOLUTION RESPIRATORY (INHALATION)
Qty: 120 | Refills: 3 | Status: ACTIVE | COMMUNITY
Start: 2022-05-13 | End: 1900-01-01

## 2022-05-13 NOTE — ASSESSMENT
[FreeTextEntry1] : Ms. APARICIO is a 86 year old female with a history of originally form [place], secondary smoking exposure, CAD, diverticulitis, dermatographism, GERD, arthritis, osteoporosis, low VItamin D, asthma, elevated cholesterol, hepatitis C, kidney stone  presenting to the office today for a follow-up pulmonary evaluation for SOB, mild to moderate persistent asthma, allergies, GERD, idiopathic urticaria, poor sleep - active asthma, s/p hospitalization UTI [facility] (post COVID-19 4/2022) \par \par Her shortness of breath is multifactorial due to:\par -poor mechanics of breathing \par -out of shape \par -pulmonary disease\par    -mild to moderate persistent asthma \par -?cardiac disease \par \par problem 1: mild to moderate persistent asthma (active)\par -nebulizer treatment today (albuterol)\par -Add Bevespi 2 puffs BID (first)\par -Add Flovent  BID (second)\par -continue Albuterol via nebulizer, Q6H\par -continue Singulair 10 mg QHS \par -Asthma is  believed to be caused by inherited (genetic) and environmental factor, but its exact cause is unknown. Asthma may be triggered by allergens, lung infections, or irritants in the air. Asthma triggers are different for each person \par -Inhaler technique reviewed as well as oral hygiene techniques reviewed with patient. Avoidance of cold air, extremes of temperature, rescue inhaler should be used before exercise. Order of medication reviewed with patient. Recommended use of a cool mist humidifier in the bedroom. \par \par problem 2: allergies/ sinus \par -continue Allegra 180 mg QAM \par -s/p Blood work to include: asthma panel (+), food IgE panel (+), IgE level (-), eosinophil level (-), vitamin D level (-) \par -Environmental measures for allergies were encouraged including mattress and pillow covers, air purifier, and environmental controls. \par \par problem 3: GERD \par -continue Omeprazole 20 mg QAM, pre-meal \par -Rule of 2s: avoid eating too much, eating too late, eating too spicy, eating two hours before bed.\par -Things to avoid including overeating, spicy foods, tight clothing, eating within three hours of bed, this list is not all inclusive. \par -For treatment of reflux, possible options discussed including diet control, H2 blockers, PPIs, as well as coating motility agents discussed as treatment options. Timing of meals and proximity of last meal to sleep were discussed. If symptoms persist, a formal gastrointestinal evaluation is needed. \par \par Problem 4:idiopathic urticaria\par -compete IgE level blood test \par \par Problem 5 poor sleep ?CHRIS\par -continue hydroxyzine 10mg QHS\par -complete home sleep study \par Sleep apnea is associated with adverse clinical consequences which can affect most organ systems.  Cardiovascular disease risk includes arrhythmias, atrial fibrillation, hypertension, coronary artery disease, and stroke. Metabolic disorders include diabetes type 2, non-alcoholic fatty liver disease. Mood disorder especially depression; and cognitive decline especially in the elderly. Associations with  chronic reflux/Reich’s esophagus some but not all inclusive. \par -Reasons  include arousal consistent with hypopnea; respiratory events most prominent in REM sleep or supine position; therefore sleep staging and body position are important for accurate diagnosis and estimation of AHI. \par \par Problem 6 Low Vitamin D\par -continue Vitamin D supplements \par Low vitamin D levels have been associated with asthma exacerbations and increased allergic symptoms. The goal based on recent information is maintaining levels between 50-70 and low normal is 30. Recommended 50,000 units every two weeks to once a month depending on the level. \par problem 6: cardiac disease\par -recommended to continue to follow up with Cardiologist \par \par problem 7: poor breathing mechanics\par -Recommended Wim Hof and Buteyko breathing techniques \par -Proper breathing techniques were reviewed with an emphasis of exhalation. Patient instructed to breath in for 1 second and out for four seconds. Patient was encouraged to not talk while walking. \par \par problem 8: out of shape \par -Weight loss, exercise, and diet control were discussed and are highly encouraged. Treatment options were given such as, aqua therapy, and contacting a nutritionist. Recommended to use the elliptical, stationary bike, less use of treadmill. Mindful eating was explained to the patient Obesity is associated with worsening asthma, shortness of breath, and potential for cardiac disease, diabetes, and other underlying medical conditions\par \par problem 9: health maintenance \par -recommended probiotic course\par -s/p COVID infection 4/2022 - Paxlovid \par -Recommended not to get an additional COVID-19 booster at this time until it is updated against the current variants \par -s/p COVID-19 vaccine x3 \par -recommended yearly flu shot after October 15\par -recommended strep pneumonia vaccines: Prevnar-13 vaccine, followed by Pneumo vaccine 23 one year following after 65\par -recommended early intervention for Upper Respiratory Infections (URIs)\par -recommended regular osteoporosis evaluations\par -recommended early dermatological evaluations\par -recommended after the age of 50 to the age of 70, colonoscopy every 5 years\par \par F/U in 6-8 weeks.\par She is encouraged to call with any changes, concerns, or questions\par

## 2022-05-13 NOTE — ADDENDUM
[FreeTextEntry1] : Documented by Tyrel Arellano acting as a scribe for Dr. Bentley Hall on 05/13/2022.\par \par All medical record entries made by the Scribe were at my, Dr. Bentley Hall's, direction and personally dictated by me on 05/13/2022. I have reviewed the chart and agree that the record accurately reflects my personal performance of the history, physical exam, assessment and plan. I have also personally directed, reviewed, and agree with the discharge instructions.

## 2022-05-13 NOTE — HISTORY OF PRESENT ILLNESS
[TextBox_4] : Ms. APARICIO is a 86 year old female with a history of originally form [place], secondary smoking exposure, CAD, diverticulitis, dermatographism, GERD, arthritis, osteoporosis, ?OW, low VItamin D, asthma, elevated cholesterol, hepatitis C, kidney stone  presenting to the office today for a follow-up pulmonary evaluation. Her chief complaint is\par -she notes not feeling well\par -she notes being discharged from [facility] two days ago \par -she notes being diagnosed with COVID April 29th and being given Paxlovid\par -she notes testing negative on May 6th\par -she notes having to go to the hospital on May 8th due to a severe cough and burning feeling during bowel movements\par -she notes testing positive for a UTI and was admitted\par -she notes right leg pain\par -she notes needing to see a neurologist\par -she notes getting Zosyn and another antibiotic for UTI\par -she notes taking Tylenol for her pain\par -she notes getting albuterol for her cough\par -she notes taking amoxicillin 200 mg\par -she notes SOB at times\par -she notes a lots of phlegm \par -she notes nausea possibly due to antibiotics\par -she notes her bowels are regular \par -she notes her weight is stable \par -she notes she does not have a sense of taste now\par -she notes a normal appetite\par -she notes taking Flovent for her asthma\par -s/p hospitalization for UTI and cough \par \par \par -patient denies any headaches, vomiting, fever, chills, sweats, chest pain, chest pressure, palpitations, wheezing, fatigue, diarrhea, constipation, dysphagia, myalgias, dizziness, leg swelling, itchy eyes, itchy ears, heartburn, reflux or sour taste in the mouth

## 2022-05-16 ENCOUNTER — RX RENEWAL (OUTPATIENT)
Age: 87
End: 2022-05-16

## 2022-05-20 ENCOUNTER — APPOINTMENT (OUTPATIENT)
Dept: INTERNAL MEDICINE | Facility: CLINIC | Age: 87
End: 2022-05-20

## 2022-05-23 ENCOUNTER — NON-APPOINTMENT (OUTPATIENT)
Age: 87
End: 2022-05-23

## 2022-05-25 ENCOUNTER — APPOINTMENT (OUTPATIENT)
Dept: GASTROENTEROLOGY | Facility: CLINIC | Age: 87
End: 2022-05-25
Payer: MEDICARE

## 2022-05-25 VITALS
SYSTOLIC BLOOD PRESSURE: 120 MMHG | DIASTOLIC BLOOD PRESSURE: 50 MMHG | OXYGEN SATURATION: 95 % | BODY MASS INDEX: 24.38 KG/M2 | HEART RATE: 88 BPM | WEIGHT: 113 LBS | HEIGHT: 57 IN

## 2022-05-25 PROCEDURE — 99214 OFFICE O/P EST MOD 30 MIN: CPT

## 2022-05-25 NOTE — ASSESSMENT
[FreeTextEntry1] : 1. chronic constipation\par \par plan linzess daily\par \par 2. right groin pain, h/o IH repair, no recurrent hernia, possible related to mesh,scarring, musculoskeletal\par \par plan tylenol as needed\par \par 3. recurrent uti\par \par plan urology f/u

## 2022-05-25 NOTE — HISTORY OF PRESENT ILLNESS
[FreeTextEntry1] : 85 yo female with h/o recent covid  , was hospitalized. had rlq  pain, had ct done unrevaling. has uti now with c/o \par constipation, patient reports last bm today with senna tea. no weight loss,no brbpr\par \par s/p colonoscopy in 202 incomplete severe diverticulosis\par s/p ct colonography, in 2019  divertiulosis

## 2022-05-25 NOTE — REVIEW OF SYSTEMS
[Fever] : no fever [Chills] : no chills [Feeling Poorly] : feeling poorly [Feeling Tired] : feeling tired [Eyesight Problems] : no eyesight problems [Nosebleeds] : no nosebleeds [Chest Pain] : no chest pain [Cough] : no cough [SOB on Exertion] : no shortness of breath during exertion [As Noted in HPI] : as noted in HPI [Pelvic Pain] : no pelvic pain [Limb Pain] : no limb pain [Itching] : no itching [Anxiety] : no anxiety [Depression] : no depression [Muscle Weakness] : no muscle weakness [Swollen Glands] : no swollen glands

## 2022-05-25 NOTE — PHYSICAL EXAM
[General Appearance - Alert] : alert [General Appearance - In No Acute Distress] : in no acute distress [General Appearance - Well Nourished] : well nourished [General Appearance - Well Developed] : well developed [Sclera] : the sclera and conjunctiva were normal [Hearing Threshold Finger Rub Not Crockett] : hearing was normal [Auscultation Breath Sounds / Voice Sounds] : lungs were clear to auscultation bilaterally [Heart Sounds] : normal S1 and S2 [Bowel Sounds] : normal bowel sounds [Abdomen Soft] : soft [Abdomen Tenderness] : non-tender [No CVA Tenderness] : no ~M costovertebral angle tenderness [Nail Clubbing] : no clubbing  or cyanosis of the fingernails [] : no rash [Skin Lesions] : no skin lesions [No Focal Deficits] : no focal deficits [Oriented To Time, Place, And Person] : oriented to person, place, and time

## 2022-07-17 ENCOUNTER — RX RENEWAL (OUTPATIENT)
Age: 87
End: 2022-07-17

## 2022-08-01 ENCOUNTER — APPOINTMENT (OUTPATIENT)
Dept: INTERNAL MEDICINE | Facility: CLINIC | Age: 87
End: 2022-08-01

## 2022-08-01 VITALS
HEART RATE: 64 BPM | SYSTOLIC BLOOD PRESSURE: 150 MMHG | WEIGHT: 112 LBS | DIASTOLIC BLOOD PRESSURE: 70 MMHG | OXYGEN SATURATION: 98 % | HEIGHT: 58 IN | BODY MASS INDEX: 23.51 KG/M2

## 2022-08-01 DIAGNOSIS — Z09 ENCOUNTER FOR FOLLOW-UP EXAMINATION AFTER COMPLETED TREATMENT FOR CONDITIONS OTHER THAN MALIGNANT NEOPLASM: ICD-10-CM

## 2022-08-01 DIAGNOSIS — Z87.898 PERSONAL HISTORY OF OTHER SPECIFIED CONDITIONS: ICD-10-CM

## 2022-08-01 DIAGNOSIS — Z86.59 PERSONAL HISTORY OF OTHER MENTAL AND BEHAVIORAL DISORDERS: ICD-10-CM

## 2022-08-01 DIAGNOSIS — Z01.818 ENCOUNTER FOR OTHER PREPROCEDURAL EXAMINATION: ICD-10-CM

## 2022-08-01 DIAGNOSIS — N94.9 UNSPECIFIED CONDITION ASSOCIATED WITH FEMALE GENITAL ORGANS AND MENSTRUAL CYCLE: ICD-10-CM

## 2022-08-01 DIAGNOSIS — M54.31 SCIATICA, RIGHT SIDE: ICD-10-CM

## 2022-08-01 DIAGNOSIS — R10.31 RIGHT LOWER QUADRANT PAIN: ICD-10-CM

## 2022-08-01 DIAGNOSIS — H26.9 UNSPECIFIED CATARACT: ICD-10-CM

## 2022-08-01 DIAGNOSIS — Z87.19 PERSONAL HISTORY OF OTHER DISEASES OF THE DIGESTIVE SYSTEM: ICD-10-CM

## 2022-08-01 PROCEDURE — G0444 DEPRESSION SCREEN ANNUAL: CPT

## 2022-08-01 PROCEDURE — G0439: CPT

## 2022-08-01 RX ORDER — PREDNISOLONE ACETATE 10 MG/ML
1 SUSPENSION/ DROPS OPHTHALMIC
Qty: 5 | Refills: 0 | Status: COMPLETED | COMMUNITY
Start: 2022-03-11

## 2022-08-01 RX ORDER — FEXOFENADINE HYDROCHLORIDE 180 MG/1
180 TABLET ORAL DAILY
Qty: 1 | Refills: 2 | Status: COMPLETED | COMMUNITY
Start: 2020-05-12 | End: 2022-08-01

## 2022-08-01 RX ORDER — GABAPENTIN 100 MG/1
100 CAPSULE ORAL
Qty: 90 | Refills: 0 | Status: COMPLETED | COMMUNITY
Start: 2022-06-09

## 2022-08-01 RX ORDER — AZITHROMYCIN 250 MG/1
250 TABLET, FILM COATED ORAL
Qty: 1 | Refills: 0 | Status: COMPLETED | COMMUNITY
Start: 2022-06-03 | End: 2022-08-01

## 2022-08-01 RX ORDER — HYDROXYZINE HYDROCHLORIDE 10 MG/1
10 TABLET ORAL
Qty: 30 | Refills: 1 | Status: COMPLETED | COMMUNITY
Start: 2022-03-10 | End: 2022-08-01

## 2022-08-01 RX ORDER — NIRMATRELVIR AND RITONAVIR 300-100 MG
20 X 150 MG & KIT ORAL
Qty: 30 | Refills: 0 | Status: COMPLETED | COMMUNITY
Start: 2022-04-29

## 2022-08-01 RX ORDER — PHENAZOPYRIDINE HYDROCHLORIDE 200 MG/1
200 TABLET ORAL
Qty: 30 | Refills: 0 | Status: COMPLETED | COMMUNITY
Start: 2022-05-20

## 2022-08-01 RX ORDER — PHENAZOPYRIDINE HYDROCHLORIDE 100 MG/1
100 TABLET ORAL
Qty: 30 | Refills: 0 | Status: COMPLETED | COMMUNITY
Start: 2021-06-07 | End: 2022-08-01

## 2022-08-01 RX ORDER — OFLOXACIN 3 MG/ML
0.3 SOLUTION/ DROPS OPHTHALMIC
Qty: 5 | Refills: 0 | Status: COMPLETED | COMMUNITY
Start: 2022-03-11

## 2022-08-01 RX ORDER — ESTRADIOL 0.1 MG/G
0.1 CREAM VAGINAL
Qty: 42 | Refills: 0 | Status: COMPLETED | COMMUNITY
Start: 2022-06-09

## 2022-08-01 RX ORDER — MONTELUKAST 10 MG/1
10 TABLET, FILM COATED ORAL
Qty: 1 | Refills: 1 | Status: COMPLETED | COMMUNITY
Start: 2022-03-10 | End: 2022-08-01

## 2022-08-01 RX ORDER — MIRABEGRON 50 MG/1
50 TABLET, FILM COATED, EXTENDED RELEASE ORAL
Qty: 90 | Refills: 3 | Status: COMPLETED | COMMUNITY
Start: 2021-09-01 | End: 2022-08-01

## 2022-08-01 NOTE — INTERPRETER SERVICES
[Patient Declined  Services] : - None: Patient declined  services [Interpreters_FullName] : Renetta [Interpreters_Relationshiptopatient] : Daughter [TWNoteComboBox1] : Sudanese

## 2022-08-01 NOTE — PHYSICAL EXAM
[Normal Sclera/Conjunctiva] : normal sclera/conjunctiva [EOMI] : extraocular movements intact [Normal Outer Ear/Nose] : the outer ears and nose were normal in appearance [Normal Oropharynx] : the oropharynx was normal [No Varicosities] : no varicosities [Pedal Pulses Present] : the pedal pulses are present [No Edema] : there was no peripheral edema [No Extremity Clubbing/Cyanosis] : no extremity clubbing/cyanosis [Soft] : abdomen soft [Non Tender] : non-tender [Non-distended] : non-distended [No Masses] : no abdominal mass palpated [No HSM] : no HSM [Normal Supraclavicular Nodes] : no supraclavicular lymphadenopathy [Coordination Grossly Intact] : coordination grossly intact [Normal] : affect was normal and insight and judgment were intact [de-identified] : b/l cataract sx [de-identified] : No swelling, erythema of right ankle or leg. normal ROM

## 2022-08-01 NOTE — REVIEW OF SYSTEMS
[Negative] : Heme/Lymph [FreeTextEntry9] : RLE pain in ankle, calf, foot pt reports not very bothersome. Will rtc if not resolving.

## 2022-08-01 NOTE — HEALTH RISK ASSESSMENT
[Good] : ~his/her~  mood as  good [Never] : Never [No] : In the past 12 months have you used drugs other than those required for medical reasons? No [0] : 2) Feeling down, depressed, or hopeless: Not at all (0) [PHQ-2 Negative - No further assessment needed] : PHQ-2 Negative - No further assessment needed [Alone] : lives alone [Retired] : retired [Single] : single [Feels Safe at Home] : Feels safe at home [Fully functional (bathing, dressing, toileting, transferring, walking, feeding)] : Fully functional (bathing, dressing, toileting, transferring, walking, feeding) [Fully functional (using the telephone, shopping, preparing meals, housekeeping, doing laundry, using] : Fully functional and needs no help or supervision to perform IADLs (using the telephone, shopping, preparing meals, housekeeping, doing laundry, using transportation, managing medications and managing finances) [Reports changes in hearing] : Reports changes in hearing [Audit-CScore] : 0 [de-identified] : walking 30 minutes [de-identified] : daughter reports excellent - lots of vegetables/fruits [[id number]] : 0 [Sexually Active] : not sexually active [High Risk Behavior] : no high risk behavior [Reports changes in vision] : Reports no changes in vision [Reports changes in dental health] : Reports no changes in dental health [de-identified] : she tried hearing aids and  [de-identified] : hard to see fine print. needs new glasses.  [de-identified] : dentures

## 2022-08-01 NOTE — ASSESSMENT
[FreeTextEntry1] : 87F originally from [facility] with chronic constipation, diverticulosis, dermatographism, GERD, arthritis, osteoporosis, low vitamin D. HLD, HCV, nephrolithiasis, asthma presents for new patient visit. \par \par 1. Asthma\par -reviewed note from Dr. Hall 5/13/22 - patient on bevespi, flovent HFA and nebulizer albuterol, singular 10 mg daily with exacerbation from allergies and GERD\par \par 2. Hx cataract surgery - reviewed note from Dr. Poole 4/15/22. \par \par 3. Chronic constipation\par -reviewed note from Dr. Licea 5/25/22\par \par 4. HCV - \par -s/p treatment and is resolved. \par \par 5. Osteoporosis\par -prolia with endocrinology office - last 3/18/2022\par \par 6. HTN\par -carvedilol 3.125 mg BID, losartan 50 mg daily - bp not controlled. will call pt tomorrow and check home BP if elevated, increase carvedilol to 6.25 mg BID. \par \par 7. HLD\par -rosuvastatin 10 mg daily \par \par 8. HCM\par -DEXA 2021 osteoporosis, on treatment\par -HIV/HCV declined last note 2020\par -covid vaccine completed, booster completed. \par -pneumonia vaccines UTD\par -tdap, shingrix - advise to obtain in pharmacy to avoid charge in office. pt wants to think about before obtaining\par \par 9. Hearing loss - audiology referral\par \par 10. RLE pain - c/w tylenol prn, rtc if no resolution in next few weeks. \par \par 11. labs - had done recently with raad kumar will email results to us.

## 2022-08-01 NOTE — HISTORY OF PRESENT ILLNESS
[Family Member] : family member [FreeTextEntry1] : Visit to establish care with new primary care doctor\par  [de-identified] : 87F originally from [facility] with chronic constipation, diverticulosis, dermatographism, GERD, arthritis, osteoporosis, low vitamin D. HLD, HCV, nephrolithiasis, asthma presents for new patient visit. \par \par She saw another doctor - Dr. Samy Borden who started doxycyline for lyme disease. He is a homeopathic doctor. \par Hx of botox in the bladder, has incontinence, frequent UTI\par Cataract surgery recently

## 2022-08-02 ENCOUNTER — NON-APPOINTMENT (OUTPATIENT)
Age: 87
End: 2022-08-02

## 2022-08-02 VITALS — DIASTOLIC BLOOD PRESSURE: 52 MMHG | SYSTOLIC BLOOD PRESSURE: 121 MMHG

## 2022-08-14 ENCOUNTER — NON-APPOINTMENT (OUTPATIENT)
Age: 87
End: 2022-08-14

## 2022-08-17 ENCOUNTER — NON-APPOINTMENT (OUTPATIENT)
Age: 87
End: 2022-08-17

## 2022-08-22 ENCOUNTER — APPOINTMENT (OUTPATIENT)
Dept: PULMONOLOGY | Facility: CLINIC | Age: 87
End: 2022-08-22

## 2022-08-22 VITALS
DIASTOLIC BLOOD PRESSURE: 59 MMHG | OXYGEN SATURATION: 98 % | TEMPERATURE: 97.6 F | WEIGHT: 113 LBS | HEART RATE: 74 BPM | RESPIRATION RATE: 16 BRPM | BODY MASS INDEX: 22.78 KG/M2 | SYSTOLIC BLOOD PRESSURE: 120 MMHG | HEIGHT: 59 IN

## 2022-08-22 DIAGNOSIS — M54.9 DORSALGIA, UNSPECIFIED: ICD-10-CM

## 2022-08-22 DIAGNOSIS — J45.909 UNSPECIFIED ASTHMA, UNCOMPLICATED: ICD-10-CM

## 2022-08-22 PROCEDURE — 94010 BREATHING CAPACITY TEST: CPT

## 2022-08-22 PROCEDURE — 99214 OFFICE O/P EST MOD 30 MIN: CPT | Mod: CS,25

## 2022-08-22 PROCEDURE — 94727 GAS DIL/WSHOT DETER LNG VOL: CPT

## 2022-08-22 PROCEDURE — 71046 X-RAY EXAM CHEST 2 VIEWS: CPT

## 2022-08-22 PROCEDURE — 95012 NITRIC OXIDE EXP GAS DETER: CPT

## 2022-08-22 PROCEDURE — 94729 DIFFUSING CAPACITY: CPT

## 2022-08-22 NOTE — ADDENDUM
[FreeTextEntry1] : Documented by Fareed Kern acting as a scribe for Dr. Bentley Hall on 08/22/2022 \par \par All medical record entries made by the Scribe were at my, Dr. Bentley Hall's, direction and personally dictated by me on 08/22/2022 . I have reviewed the chart and agree that the record accurately reflects my personal performance of the history, physical exam, assessment and plan. I have also personally directed, reviewed, and agree with the discharge instructions

## 2022-08-22 NOTE — ASSESSMENT
[FreeTextEntry1] : Ms. APARICIO is a 86 year old female with a history of originally form [place], secondary smoking exposure, CAD, diverticulitis, dermatographism, GERD, arthritis, osteoporosis, low VItamin D, asthma, elevated cholesterol, hepatitis C, kidney stone  presenting to the office today for a follow-up pulmonary evaluation for SOB, mild to moderate persistent asthma, allergies, GERD, idiopathic urticaria, poor sleep - active asthma, s/p hospitalization UTI [place] (post COVID-19 4/2022)- #1 issues is back pain (MS) \par \par Her shortness of breath is multifactorial due to:\par -poor mechanics of breathing \par -out of shape \par -pulmonary disease\par    -mild to moderate persistent asthma \par -?cardiac disease \par \par problem 1: mild to moderate persistent asthma -quiet \par -continue nebulizer treatment (albuterol) PRN Q6H\par -Add Bevespi 2 puffs BID (first)\par -Add Flovent  BID (second)\par -continue Albuterol via nebulizer, Q6H\par -continue Singulair 10 mg QHS \par -Asthma is  believed to be caused by inherited (genetic) and environmental factor, but its exact cause is unknown. Asthma may be triggered by allergens, lung infections, or irritants in the air. Asthma triggers are different for each person \par -Inhaler technique reviewed as well as oral hygiene techniques reviewed with patient. Avoidance of cold air, extremes of temperature, rescue inhaler should be used before exercise. Order of medication reviewed with patient. Recommended use of a cool mist humidifier in the bedroom. \par \par problem 2: allergies/ sinus \par -continue Allegra 180 mg QAM \par -s/p Blood work to include: asthma panel (+), food IgE panel (+), IgE level (-), eosinophil level (-), vitamin D level (-) \par -Environmental measures for allergies were encouraged including mattress and pillow covers, air purifier, and environmental controls. \par \par problem 3: GERD \par -continue Omeprazole 20 mg QAM, pre-meal \par -Rule of 2s: avoid eating too much, eating too late, eating too spicy, eating two hours before bed.\par -Things to avoid including overeating, spicy foods, tight clothing, eating within three hours of bed, this list is not all inclusive. \par -For treatment of reflux, possible options discussed including diet control, H2 blockers, PPIs, as well as coating motility agents discussed as treatment options. Timing of meals and proximity of last meal to sleep were discussed. If symptoms persist, a formal gastrointestinal evaluation is needed. \par \par Problem 4:idiopathic urticaria\par -s/p IgE level blood test (-)\par -continue hydroxyzine 10 mg Q8H\par \par Problem 5 poor sleep ?CHRIS\par -continue hydroxyzine 10mg QHS\par -complete home sleep study \par Sleep apnea is associated with adverse clinical consequences which can affect most organ systems.  Cardiovascular disease risk includes arrhythmias, atrial fibrillation, hypertension, coronary artery disease, and stroke. Metabolic disorders include diabetes type 2, non-alcoholic fatty liver disease. Mood disorder especially depression; and cognitive decline especially in the elderly. Associations with  chronic reflux/Reich’s esophagus some but not all inclusive. \par -Reasons  include arousal consistent with hypopnea; respiratory events most prominent in REM sleep or supine position; therefore sleep staging and body position are important for accurate diagnosis and estimation of AHI. \par \par Problem 6 Low Vitamin D\par -continue Vitamin D supplements \par Low vitamin D levels have been associated with asthma exacerbations and increased allergic symptoms. The goal based on recent information is maintaining levels between 50-70 and low normal is 30. Recommended 50,000 units every two weeks to once a month depending on the level. \par problem 6: cardiac disease\par -recommended to continue to follow up with Cardiologist \par \par problem 7: poor breathing mechanics\par -Recommended Wim Hof and Buteyko breathing techniques \par -Proper breathing techniques were reviewed with an emphasis of exhalation. Patient instructed to breath in for 1 second and out for four seconds. Patient was encouraged to not talk while walking. \par \par problem 8: out of shape \par -Weight loss, exercise, and diet control were discussed and are highly encouraged. Treatment options were given such as, aqua therapy, and contacting a nutritionist. Recommended to use the elliptical, stationary bike, less use of treadmill. Mindful eating was explained to the patient Obesity is associated with worsening asthma, shortness of breath, and potential for cardiac disease, diabetes, and other underlying medical conditions\par \par Problem 9B: Back pain c/w MS\par -CXR- clear \par \par problem 9: health maintenance \par -recommended probiotic course\par -s/p COVID infection 4/2022 - Paxlovid \par -Recommended not to get an additional COVID-19 booster at this time until it is updated against the current variants \par -s/p COVID-19 vaccine x3 \par -recommended yearly flu shot after October 15\par -recommended strep pneumonia vaccines: Prevnar-13 vaccine, followed by Pneumo vaccine 23 one year following after 65\par -recommended early intervention for Upper Respiratory Infections (URIs)\par -recommended regular osteoporosis evaluations\par -recommended early dermatological evaluations\par -recommended after the age of 50 to the age of 70, colonoscopy every 5 years\par \par F/U in 6-8 weeks.\par She is encouraged to call with any changes, concerns, or questions\par

## 2022-08-22 NOTE — PROCEDURE
[FreeTextEntry1] : CXR reveals a normal size heart and lungs-no infiltrate or effusion, calcified aortic knob, no explanation for pain\par \par FULL PFTs reveals normal flows; FEV1 was 1.32 L which is 98% of predicted; normal lung volumes; normal diffusion of 12.0, which is 95% of predicted; normal flow volume loop \par \par FENO was 13; normal value being less than 25\par Fractional exhaled nitric oxide (FENO) is regarded as a simple, noninvasive method for assessing eosinophilic airway inflammation. Produced by a variety of cells within the lung, nitric oxide (NO) concentrations are generally low in healthy individuals. However, high concentrations of NO appear to be involved in nonspecific host defense mechanisms and chronic inflammatory diseases such as asthma. The American Thoracic Society (ATS) therefore has recommended using FENO to aid in the diagnosis and monitoring of eosinophilic airway inflammation and asthma, and for identifying steroid responsive individuals whose chronic respiratory symptoms may be airway inflammation.

## 2022-08-22 NOTE — HISTORY OF PRESENT ILLNESS
[TextBox_4] : Ms. APARICIO is a 86 year old female with a history of originally form [place], secondary smoking exposure, CAD, diverticulitis, dermatographism, GERD, arthritis, osteoporosis, ?OW, low VItamin D, asthma, elevated cholesterol, hepatitis C, kidney stone  presenting to the office today for a follow-up pulmonary evaluation. Her chief complaint is\par \par -she notes generally feeling good \par -she notes persistent left sided lumbago onset 1.5 weeks after exertion \par -she notes constipation that is controlled with linzess \par -she notes walking for 30 min exercise with no limitations \par -she notes weight is stable \par -she denies dysphonia \par -she notes good quality of sleep \par -she notes getting about 4-5 hrs of sleep \par -she denies taking any new medications, vitamins, or supplements. \par -she denies wheezing\par -she notes use of Bevespi and Flovent \par -she notes intermittent pruritus \par \par -She denies any visual issues, headaches, nausea, vomiting, fever, chills, sweats, chest pains, chest pressure, diarrhea, dysphagia, myalgia, dizziness, leg swelling, leg pain, itchy eyes, itchy ears, heartburn, reflux, or sour taste in the mouth.

## 2022-09-06 ENCOUNTER — APPOINTMENT (OUTPATIENT)
Dept: ENDOCRINOLOGY | Facility: CLINIC | Age: 87
End: 2022-09-06

## 2022-09-06 VITALS — WEIGHT: 110 LBS | TEMPERATURE: 97.5 F | HEIGHT: 57.3 IN | BODY MASS INDEX: 23.41 KG/M2

## 2022-09-06 PROCEDURE — 77080 DXA BONE DENSITY AXIAL: CPT

## 2022-09-08 ENCOUNTER — NON-APPOINTMENT (OUTPATIENT)
Age: 87
End: 2022-09-08

## 2022-09-13 ENCOUNTER — APPOINTMENT (OUTPATIENT)
Dept: ENDOCRINOLOGY | Facility: CLINIC | Age: 87
End: 2022-09-13

## 2022-09-19 ENCOUNTER — APPOINTMENT (OUTPATIENT)
Dept: ENDOCRINOLOGY | Facility: CLINIC | Age: 87
End: 2022-09-19

## 2022-09-23 ENCOUNTER — NON-APPOINTMENT (OUTPATIENT)
Age: 87
End: 2022-09-23

## 2022-09-29 ENCOUNTER — NON-APPOINTMENT (OUTPATIENT)
Age: 87
End: 2022-09-29

## 2022-10-02 ENCOUNTER — RX RENEWAL (OUTPATIENT)
Age: 87
End: 2022-10-02

## 2022-10-17 ENCOUNTER — APPOINTMENT (OUTPATIENT)
Dept: OPHTHALMOLOGY | Facility: CLINIC | Age: 87
End: 2022-10-17

## 2022-10-18 ENCOUNTER — APPOINTMENT (OUTPATIENT)
Dept: ENDOCRINOLOGY | Facility: CLINIC | Age: 87
End: 2022-10-18

## 2022-10-19 ENCOUNTER — APPOINTMENT (OUTPATIENT)
Dept: OPHTHALMOLOGY | Facility: CLINIC | Age: 87
End: 2022-10-19

## 2022-10-19 ENCOUNTER — NON-APPOINTMENT (OUTPATIENT)
Age: 87
End: 2022-10-19

## 2022-10-19 PROCEDURE — 92012 INTRM OPH EXAM EST PATIENT: CPT

## 2022-10-19 PROCEDURE — 92134 CPTRZ OPH DX IMG PST SGM RTA: CPT

## 2022-10-31 ENCOUNTER — NON-APPOINTMENT (OUTPATIENT)
Age: 87
End: 2022-10-31

## 2022-11-01 ENCOUNTER — APPOINTMENT (OUTPATIENT)
Dept: PULMONOLOGY | Facility: CLINIC | Age: 87
End: 2022-11-01

## 2022-11-01 VITALS
WEIGHT: 110 LBS | OXYGEN SATURATION: 98 % | TEMPERATURE: 97.3 F | HEIGHT: 57 IN | SYSTOLIC BLOOD PRESSURE: 130 MMHG | DIASTOLIC BLOOD PRESSURE: 60 MMHG | RESPIRATION RATE: 16 BRPM | BODY MASS INDEX: 23.73 KG/M2 | HEART RATE: 86 BPM

## 2022-11-01 PROCEDURE — 71046 X-RAY EXAM CHEST 2 VIEWS: CPT

## 2022-11-01 PROCEDURE — 99214 OFFICE O/P EST MOD 30 MIN: CPT | Mod: CS,25

## 2022-11-01 RX ORDER — CYCLOBENZAPRINE HYDROCHLORIDE 10 MG/1
10 TABLET, FILM COATED ORAL
Qty: 7 | Refills: 0 | Status: DISCONTINUED | COMMUNITY
Start: 2022-08-15

## 2022-11-01 RX ORDER — BENZONATATE 100 MG/1
100 CAPSULE ORAL
Qty: 30 | Refills: 0 | Status: DISCONTINUED | COMMUNITY
Start: 2022-10-18

## 2022-11-01 RX ORDER — CLARITHROMYCIN 500 MG/1
500 TABLET, FILM COATED ORAL
Qty: 20 | Refills: 0 | Status: DISCONTINUED | COMMUNITY
Start: 2022-10-18

## 2022-11-01 NOTE — PHYSICAL EXAM
[No Acute Distress] : no acute distress [Normal Oropharynx] : normal oropharynx [III] : Mallampati Class: III [Normal Appearance] : normal appearance [No Neck Mass] : no neck mass [Normal Rate/Rhythm] : normal rate/rhythm [Normal S1, S2] : normal s1, s2 [No Murmurs] : no murmurs [No Resp Distress] : no resp distress [No Abnormalities] : no abnormalities [Benign] : benign [Normal Gait] : normal gait [No Clubbing] : no clubbing [No Cyanosis] : no cyanosis [No Edema] : no edema [FROM] : FROM [Normal Color/ Pigmentation] : normal color/ pigmentation [No Focal Deficits] : no focal deficits [Oriented x3] : oriented x3 [Normal Affect] : normal affect [TextBox_68] : I:E ratio 1:3; mild expiratory wheezes

## 2022-11-01 NOTE — ADDENDUM
[FreeTextEntry1] : Documented by Tyrel Arellano acting as a scribe for Dr. Bentley Hall on 11/01/2022.\par \par All medical record entries made by the Scribe were at my, Dr. Bentley Hall's, direction and personally dictated by me on 11/01/2022. I have reviewed the chart and agree that the record accurately reflects my personal performance of the history, physical exam, assessment and plan. I have also personally directed, reviewed, and agree with the discharge instructions.

## 2022-11-01 NOTE — HISTORY OF PRESENT ILLNESS
[TextBox_4] : Ms. APARICIO is a 87 year old female with a history of originally form [place], secondary smoking exposure, CAD, diverticulitis, dermatographism, GERD, arthritis, osteoporosis, ?OW, low VItamin D, asthma, elevated cholesterol, hepatitis C, kidney stone  presenting to the office today for a follow-up pulmonary evaluation. Her chief complaint is\par \par -she notes that she has been coughing for a month for which she has been given Biaxin and Tessalon Pearles (did not help)\par -she notes constipation\par -she notes that her bowels are irregular \par -she notes that her cough has improved but she notes excess mucus in her throat that she needs to clear (clear color)\par -she notes ocular issues recently\par -she notes that she has been using Bevespi\par -\par \par \par -patient denies any headaches, nausea, vomiting, fever, chills, sweats, chest pain, chest pressure, palpitations, fatigue, diarrhea, dysphagia, myalgias, dizziness, leg swelling, leg pain, itchy eyes, itchy ears, heartburn, reflux or sour taste in the mouth

## 2022-11-01 NOTE — ASSESSMENT
[FreeTextEntry1] : Ms. APARICIO is a 87 year old female with a history of originally form [place], secondary smoking exposure, CAD, diverticulitis, dermatographism, GERD, arthritis, osteoporosis, low VItamin D, asthma, elevated cholesterol, hepatitis C, kidney stone  presenting to the office today for a follow-up pulmonary evaluation for SOB, mild to moderate persistent asthma, allergies, GERD, idiopathic urticaria, poor sleep - active asthma, s/p hospitalization UTI [place] (post COVID-19 4/2022)- #1 issues is asthmatic bronchitis\par \par Her shortness of breath is multifactorial due to:\par -poor mechanics of breathing \par -out of shape \par -pulmonary disease\par    -mild to moderate persistent asthma \par -?cardiac disease \par \par problem 1: mild to moderate persistent asthma -active\par -Add Prednisone 20mg for 7 days then 10mg for 7 days \par -Add generic Symbicort 160 2 inhalations BID \par -Transition from Bevespi 2 puffs BID (first)\par -Transition from Flovent  BID (second)\par -continue Albuterol via nebulizer, Q6H\par -continue Singulair 10 mg QHS \par -Asthma is  believed to be caused by inherited (genetic) and environmental factor, but its exact cause is unknown. Asthma may be triggered by allergens, lung infections, or irritants in the air. Asthma triggers are different for each person \par -Inhaler technique reviewed as well as oral hygiene techniques reviewed with patient. Avoidance of cold air, extremes of temperature, rescue inhaler should be used before exercise. Order of medication reviewed with patient. Recommended use of a cool mist humidifier in the bedroom. \par -Information sheet given to the patient to be reviewed, this medication is never to be used without consulting the prescribing physician. Proper dietary restraint is necessary specifically salt containing foods, if any reaction may occur should be reported. \par \par problem 2: allergies/ sinus \par -continue Allegra 180 mg QAM \par -s/p Blood work to include: asthma panel (+), food IgE panel (+), IgE level (-), eosinophil level (-), vitamin D level (-) \par -Environmental measures for allergies were encouraged including mattress and pillow covers, air purifier, and environmental controls. \par \par problem 3: GERD \par -continue Omeprazole 20 mg QAM, pre-meal \par -Rule of 2s: avoid eating too much, eating too late, eating too spicy, eating two hours before bed.\par -Things to avoid including overeating, spicy foods, tight clothing, eating within three hours of bed, this list is not all inclusive. \par -For treatment of reflux, possible options discussed including diet control, H2 blockers, PPIs, as well as coating motility agents discussed as treatment options. Timing of meals and proximity of last meal to sleep were discussed. If symptoms persist, a formal gastrointestinal evaluation is needed. \par \par Problem 4:idiopathic urticaria\par -s/p IgE level blood test (-)\par -continue hydroxyzine 10 mg Q8H\par \par Problem 5 poor sleep ?CHRIS\par -continue hydroxyzine 10mg QHS\par -complete home sleep study \par Sleep apnea is associated with adverse clinical consequences which can affect most organ systems.  Cardiovascular disease risk includes arrhythmias, atrial fibrillation, hypertension, coronary artery disease, and stroke. Metabolic disorders include diabetes type 2, non-alcoholic fatty liver disease. Mood disorder especially depression; and cognitive decline especially in the elderly. Associations with  chronic reflux/Reich’s esophagus some but not all inclusive. \par -Reasons  include arousal consistent with hypopnea; respiratory events most prominent in REM sleep or supine position; therefore sleep staging and body position are important for accurate diagnosis and estimation of AHI. \par \par Problem 6 Low Vitamin D\par -continue Vitamin D supplements \par Low vitamin D levels have been associated with asthma exacerbations and increased allergic symptoms. The goal based on recent information is maintaining levels between 50-70 and low normal is 30. Recommended 50,000 units every two weeks to once a month depending on the level. \par problem 6: cardiac disease\par -recommended to continue to follow up with Cardiologist \par \par problem 7: poor breathing mechanics\par -Recommended Wim Hof and Buteyko breathing techniques \par -Proper breathing techniques were reviewed with an emphasis of exhalation. Patient instructed to breath in for 1 second and out for four seconds. Patient was encouraged to not talk while walking. \par \par problem 8: out of shape \par -Weight loss, exercise, and diet control were discussed and are highly encouraged. Treatment options were given such as, aqua therapy, and contacting a nutritionist. Recommended to use the elliptical, stationary bike, less use of treadmill. Mindful eating was explained to the patient Obesity is associated with worsening asthma, shortness of breath, and potential for cardiac disease, diabetes, and other underlying medical conditions\par \par Problem 9B: Back pain c/w MS\par -CXR- clear \par \par problem 9: health maintenance \par -recommended probiotic course\par -s/p COVID infection 4/2022 - Paxlovid \par -Recommended not to get an additional COVID-19 booster at this time until it is updated against the current variants \par -s/p COVID-19 vaccine x3 \par -recommended yearly flu shot after October 15\par -recommended strep pneumonia vaccines: Prevnar-13 vaccine, followed by Pneumo vaccine 23 one year following after 65\par -recommended early intervention for Upper Respiratory Infections (URIs)\par -recommended regular osteoporosis evaluations\par -recommended early dermatological evaluations\par -recommended after the age of 50 to the age of 70, colonoscopy every 5 years\par \par F/U in 6-8 weeks.\par She is encouraged to call with any changes, concerns, or questions\par

## 2022-11-07 ENCOUNTER — APPOINTMENT (OUTPATIENT)
Dept: ENDOCRINOLOGY | Facility: CLINIC | Age: 87
End: 2022-11-07

## 2022-11-07 PROCEDURE — 96372 THER/PROPH/DIAG INJ SC/IM: CPT

## 2022-11-07 RX ORDER — DENOSUMAB 60 MG/ML
60 INJECTION SUBCUTANEOUS
Qty: 1 | Refills: 0 | Status: COMPLETED | OUTPATIENT
Start: 2022-11-04

## 2022-12-05 RX ORDER — FLUTICASONE PROPIONATE 220 UG/1
220 AEROSOL, METERED RESPIRATORY (INHALATION)
Qty: 3 | Refills: 1 | Status: DISCONTINUED | OUTPATIENT
Start: 2020-02-06 | End: 2022-12-05

## 2022-12-23 ENCOUNTER — RX RENEWAL (OUTPATIENT)
Age: 87
End: 2022-12-23

## 2022-12-27 ENCOUNTER — NON-APPOINTMENT (OUTPATIENT)
Age: 87
End: 2022-12-27

## 2022-12-27 ENCOUNTER — APPOINTMENT (OUTPATIENT)
Dept: PULMONOLOGY | Facility: CLINIC | Age: 87
End: 2022-12-27

## 2022-12-27 VITALS
WEIGHT: 116 LBS | RESPIRATION RATE: 16 BRPM | HEIGHT: 57 IN | SYSTOLIC BLOOD PRESSURE: 130 MMHG | BODY MASS INDEX: 25.03 KG/M2 | HEART RATE: 74 BPM | DIASTOLIC BLOOD PRESSURE: 65 MMHG | OXYGEN SATURATION: 98 % | TEMPERATURE: 97.7 F

## 2022-12-27 DIAGNOSIS — U07.1 COVID-19: ICD-10-CM

## 2022-12-27 PROCEDURE — 99214 OFFICE O/P EST MOD 30 MIN: CPT | Mod: CS,25

## 2022-12-27 PROCEDURE — 95012 NITRIC OXIDE EXP GAS DETER: CPT

## 2022-12-27 PROCEDURE — 94010 BREATHING CAPACITY TEST: CPT

## 2022-12-27 NOTE — HISTORY OF PRESENT ILLNESS
[TextBox_4] : Ms. APARICIO is a 87 year old female with a history of originally form [place], secondary smoking exposure, CAD, diverticulitis, dermatographism, GERD, arthritis, osteoporosis, ?OW, low VItamin D, asthma, elevated cholesterol, hepatitis C, kidney stone  presenting to the office today for a follow-up pulmonary evaluation. Her chief complaint is\par \par -she notes recent travel\par -s/p influenza \par -she notes she has a cough and phlegm production at night and mornings when she gets up\par -she notes the mucus is clear but thick \par -she notes constipation has improved\par -she notes her sinuses are non-problematic at the moment \par -she notes a painful spot on the left side of hear head\par -she notes wheezing once in a while\par -she notes acid reflux \par -she notes taking Prilosec\par -she notes back pain \par \par -patient denies any headaches, nausea, vomiting, fever, chills, sweats, chest pain, chest pressure, palpitations, wheezing, fatigue, dysphagia, myalgias, dizziness, leg swelling, leg pain, itchy eyes, itchy ears

## 2022-12-27 NOTE — ASSESSMENT
[FreeTextEntry1] : Ms. APARICIO is a 87 year old female with a history of originally form [place], secondary smoking exposure, CAD, diverticulitis, dermatographism, GERD, arthritis, osteoporosis, low VItamin D, asthma, elevated cholesterol, hepatitis C, kidney stone  presenting to the office today for a follow-up pulmonary evaluation for SOB, mild to moderate persistent asthma, allergies, GERD, idiopathic urticaria, poor sleep - active asthma, s/p hospitalization UTI [place] (post COVID-19 4/2022)- #1 issues is s/p asthmatic bronchitis (improved but not 100%) - reflux\par Her shortness of breath is multifactorial due to:\par -poor mechanics of breathing \par -out of shape \par -pulmonary disease\par    -mild to moderate persistent asthma \par -?cardiac disease \par \par problem 1: mild to moderate persistent asthma -active 11/2022\par -Add Prednisone 20mg for 7 days then 10mg for 7 days \par -continue generic Symbicort 160 2 inhalations BID \par -Transition from Bevespi 2 puffs BID (first) to Stiolto 2 Puffs BID if insurance unable to cover Bevespi\par -Transition from Flovent  BID (second)\par -continue Albuterol via nebulizer, Q6H\par -continue Singulair 10 mg QHS \par -Asthma is  believed to be caused by inherited (genetic) and environmental factor, but its exact cause is unknown. Asthma may be triggered by allergens, lung infections, or irritants in the air. Asthma triggers are different for each person \par -Inhaler technique reviewed as well as oral hygiene techniques reviewed with patient. Avoidance of cold air, extremes of temperature, rescue inhaler should be used before exercise. Order of medication reviewed with patient. Recommended use of a cool mist humidifier in the bedroom. \par -Information sheet given to the patient to be reviewed, this medication is never to be used without consulting the prescribing physician. Proper dietary restraint is necessary specifically salt containing foods, if any reaction may occur should be reported. \par \par problem 2: allergies/ sinus \par -continue Allegra 180 mg QAM \par -s/p Blood work to include: asthma panel (+), food IgE panel (+), IgE level (-), eosinophil level (-), vitamin D level (-) \par -Environmental measures for allergies were encouraged including mattress and pillow covers, air purifier, and environmental controls. \par \par problem 3: GERD (active)\par -add Pepcid 40 mg QHS \par -continue Omeprazole 20 mg QAM, pre-meal \par -Rule of 2s: avoid eating too much, eating too late, eating too spicy, eating two hours before bed.\par -Things to avoid including overeating, spicy foods, tight clothing, eating within three hours of bed, this list is not all inclusive. \par -For treatment of reflux, possible options discussed including diet control, H2 blockers, PPIs, as well as coating motility agents discussed as treatment options. Timing of meals and proximity of last meal to sleep were discussed. If symptoms persist, a formal gastrointestinal evaluation is needed. \par \par Problem 4:idiopathic urticaria\par -s/p IgE level blood test (-)\par -continue hydroxyzine 10 mg Q8H\par \par Problem 5 poor sleep ?CHRIS\par -continue hydroxyzine 10mg QHS\par -complete home sleep study \par Sleep apnea is associated with adverse clinical consequences which can affect most organ systems.  Cardiovascular disease risk includes arrhythmias, atrial fibrillation, hypertension, coronary artery disease, and stroke. Metabolic disorders include diabetes type 2, non-alcoholic fatty liver disease. Mood disorder especially depression; and cognitive decline especially in the elderly. Associations with  chronic reflux/Reich’s esophagus some but not all inclusive. \par -Reasons  include arousal consistent with hypopnea; respiratory events most prominent in REM sleep or supine position; therefore sleep staging and body position are important for accurate diagnosis and estimation of AHI. \par \par Problem 6 Low Vitamin D\par -continue Vitamin D supplements \par Low vitamin D levels have been associated with asthma exacerbations and increased allergic symptoms. The goal based on recent information is maintaining levels between 50-70 and low normal is 30. Recommended 50,000 units every two weeks to once a month depending on the level. \par problem 6: cardiac disease\par -recommended to continue to follow up with Cardiologist \par \par problem 7: poor breathing mechanics\par -Recommended Wim Hof and Buteyko breathing techniques \par -Proper breathing techniques were reviewed with an emphasis of exhalation. Patient instructed to breath in for 1 second and out for four seconds. Patient was encouraged to not talk while walking. \par \par problem 8: out of shape \par -Weight loss, exercise, and diet control were discussed and are highly encouraged. Treatment options were given such as, aqua therapy, and contacting a nutritionist. Recommended to use the elliptical, stationary bike, less use of treadmill. Mindful eating was explained to the patient Obesity is associated with worsening asthma, shortness of breath, and potential for cardiac disease, diabetes, and other underlying medical conditions\par \par Problem 9B: Back pain c/w MS\par -CXR- clear \par \par problem 9: health maintenance \par -recommended probiotic course\par -s/p COVID infection 4/2022 - Paxlovid \par -Recommended not to get an additional COVID-19 booster at this time until it is updated against the current variants \par -s/p COVID-19 vaccine x3 \par -refused yearly flu shot - 2022\par -recommended strep pneumonia vaccines: Prevnar-13 vaccine, followed by Pneumo vaccine 23 one year following after 65 (completed)\par -recommended early intervention for Upper Respiratory Infections (URIs)\par -recommended regular osteoporosis evaluations\par -recommended early dermatological evaluations\par -recommended after the age of 50 to the age of 70, colonoscopy every 5 years\par \par F/U in 6-8 weeks.\par She is encouraged to call with any changes, concerns, or questions\par

## 2022-12-27 NOTE — ADDENDUM
[FreeTextEntry1] : Documented by Tyrel Arellano acting as a scribe for Dr. Bentley Hall on 12/27/2022.\par \par All medical record entries made by the Scribe were at my, Dr. Bentley Hall's, direction and personally dictated by me on 12/27/2022. I have reviewed the chart and agree that the record accurately reflects my personal performance of the history, physical exam, assessment and plan. I have also personally directed, reviewed, and agree with the discharge instructions.

## 2022-12-27 NOTE — PROCEDURE
[FreeTextEntry1] : Feno was unable to be performed; a normal value being less than 25. Fractional exhaled nitric oxide (FENO) is regarded as a simple, noninvasive method for assessing eosinophilic airway inflammation. Produced by a variety of cells within the lung, nitric oxide (NO) concentrations are generally low in healthy individuals. However, high concentrations of NO appear to be involved in nonspecific host defense mechanisms and chronic inflammatory  diseases such as asthma. The American Thoracic Society (ATS) therefore recommended using FENO to aid in the diagnosis and monitoring of eosinophilic airway inflammation and asthma, and for identifying steroid responsive individuals whose chronic respiratory symptoms may be caused by airway inflammation \par \par PFT revealed normal flows, with a FEV1 of 1.01L, which is 84% of predicted, with a normal flow volume loop

## 2022-12-27 NOTE — PHYSICAL EXAM
[No Acute Distress] : no acute distress [Normal Oropharynx] : normal oropharynx [III] : Mallampati Class: III [Normal Appearance] : normal appearance [No Neck Mass] : no neck mass [Normal Rate/Rhythm] : normal rate/rhythm [Normal S1, S2] : normal s1, s2 [No Murmurs] : no murmurs [No Resp Distress] : no resp distress [No Abnormalities] : no abnormalities [Benign] : benign [Normal Gait] : normal gait [No Clubbing] : no clubbing [No Cyanosis] : no cyanosis [No Edema] : no edema [FROM] : FROM [Normal Color/ Pigmentation] : normal color/ pigmentation [No Focal Deficits] : no focal deficits [Oriented x3] : oriented x3 [Normal Affect] : normal affect [TextBox_68] : I:E ratio 1:3; clear

## 2023-01-03 ENCOUNTER — APPOINTMENT (OUTPATIENT)
Dept: GASTROENTEROLOGY | Facility: CLINIC | Age: 88
End: 2023-01-03
Payer: MEDICARE

## 2023-01-03 VITALS
RESPIRATION RATE: 18 BRPM | SYSTOLIC BLOOD PRESSURE: 141 MMHG | OXYGEN SATURATION: 96 % | HEART RATE: 75 BPM | BODY MASS INDEX: 23.18 KG/M2 | DIASTOLIC BLOOD PRESSURE: 62 MMHG | WEIGHT: 115 LBS | HEIGHT: 59 IN

## 2023-01-03 PROCEDURE — 99214 OFFICE O/P EST MOD 30 MIN: CPT

## 2023-01-03 NOTE — HISTORY OF PRESENT ILLNESS
[FreeTextEntry1] : 88 yo female with c/o gerd on omeprazole 20mg daily, no n/v. reports phlegm in am. Patient with c/o abdominal pain and bloating. patient reports constipation and rectal discomfort no brbpr, no melena. no weight loss. \par \par \par \par ct colonography in 2019 for constipation and weight loss, no colon lesion noted\par \par s/p egd gastritis, hiatal hernia 2020\par \par s/p colonoscopy incomplete in 202 diverticulosis.\par \par

## 2023-01-03 NOTE — REVIEW OF SYSTEMS
[As Noted in HPI] : as noted in HPI [Feeling Poorly] : not feeling poorly [Feeling Tired] : not feeling tired [Sore Throat] : no sore throat [Palpitations] : no palpitations [SOB on Exertion] : no shortness of breath during exertion [Testicular Pain] : no testicular pain [Rash] : no rash [Joint Stiffness] : no joint stiffness [Skin Wound] : no skin wound [Dizziness] : no dizziness [Fainting] : no fainting [Anxiety] : no anxiety [Muscle Weakness] : no muscle weakness [Easy Bruising] : no tendency for easy bruising

## 2023-01-03 NOTE — ASSESSMENT
[FreeTextEntry1] : 1. refractory GERD\par \par plan ppi bid\par        add h2 blocker qhs\par      ugi series\par \par 2. constipation not improved on linzess\par \par plan increase dose of linzess\par       if no improvement addd senna/colace\par \par 3. chronic abdominal pain ,no f/c tolerating po, recent ct scan last year, ? related to constipation\par \par plan f/u gi in 3 months after daily laxative use\par \par 3. rectal discomfort probable hemorrhoids\par \par plan proctozone cream

## 2023-01-03 NOTE — PHYSICAL EXAM
[Normal Appearance] : the appearance of the neck was normal [No Respiratory Distress] : no respiratory distress [Normal S1, S2] : normal S1 and S2 [None] : no edema [Normal] : normal bowel sounds, non-tender, no masses, soft, no no hepato-splenomegaly [No CVA Tenderness] : no CVA  tenderness [Abnormal Walk] : normal gait [No Clubbing, Cyanosis] : no clubbing or cyanosis of the fingernails [] : no rash [No Focal Deficits] : no focal deficits [Oriented To Time, Place, And Person] : oriented to person, place, and time [de-identified] : epigastric tenderness,mild

## 2023-01-19 ENCOUNTER — APPOINTMENT (OUTPATIENT)
Dept: INTERNAL MEDICINE | Facility: CLINIC | Age: 88
End: 2023-01-19
Payer: MEDICARE

## 2023-01-19 VITALS
WEIGHT: 112 LBS | BODY MASS INDEX: 22.58 KG/M2 | SYSTOLIC BLOOD PRESSURE: 130 MMHG | HEART RATE: 80 BPM | HEIGHT: 59 IN | DIASTOLIC BLOOD PRESSURE: 60 MMHG

## 2023-01-19 DIAGNOSIS — H91.90 UNSPECIFIED HEARING LOSS, UNSPECIFIED EAR: ICD-10-CM

## 2023-01-19 DIAGNOSIS — R32 UNSPECIFIED URINARY INCONTINENCE: ICD-10-CM

## 2023-01-19 LAB — GLUCOSE BLDC GLUCOMTR-MCNC: 115

## 2023-01-19 PROCEDURE — 82962 GLUCOSE BLOOD TEST: CPT

## 2023-01-19 PROCEDURE — 99214 OFFICE O/P EST MOD 30 MIN: CPT | Mod: 25

## 2023-01-19 RX ORDER — HYDROXYZINE HYDROCHLORIDE 10 MG/1
10 TABLET ORAL
Qty: 90 | Refills: 5 | Status: COMPLETED | COMMUNITY
Start: 2022-08-22 | End: 2023-01-19

## 2023-01-19 RX ORDER — PREDNISONE 10 MG/1
10 TABLET ORAL
Qty: 50 | Refills: 0 | Status: COMPLETED | COMMUNITY
Start: 2022-11-01 | End: 2023-01-19

## 2023-01-19 NOTE — HISTORY OF PRESENT ILLNESS
[FreeTextEntry1] : follow up  [de-identified] : 87F originally from [facility] with chronic constipation, diverticulosis, dermatographism, GERD, arthritis, osteoporosis, low vitamin D. HLD, HCV, nephrolithiasis, asthma presents for follow up\par \par Patient reporting headache on the left side of her head and earache. Happening some days per week. Had it yesterday evening. She drinks enough water. Sleep she describes as "so so." The ear is not really bothering her but it is not comfortable. She notices a change in her hearing as well. She wants to see ENT doctor. Advil helps with headaches\par \par She also wants to check her blood sugar today. No symptoms, just wants to check. \par \par Pt also wants to see urology for urinary incontinence that has persisted despite trying conservative measures

## 2023-01-19 NOTE — ASSESSMENT
[FreeTextEntry1] : 87F originally from [facility] with chronic constipation, diverticulosis, dermatographism, GERD, arthritis, osteoporosis, low vitamin D. HLD, HCV, nephrolithiasis, asthma presents for follow up; she was concerned about blood sugar and non fasting check was 115 which is normal.\par \par 1. Asthma\par -reviewed note from Dr. Hall 08/22/22 - patient on bevespi, flovent HFA and nebulizer albuterol, singular 10 mg daily with exacerbation from allergies and GERD so also on allegra 180 mg daily, omeprazole 20 mg daily and hydroxizine 10 mg \par on 11/1/2022 she was transitioned fom bevespi and flovent to stioto, prednisone and generic symbicort. \par \par 2. Hx cataract surgery - reviewed note from Dr. Poole 4/15/22. \par \par 3. Chronic constipation\par -reviewed note from Dr. Licea 01/03/23: c/w ppi and linzess dose increased. \par \par 4. HCV - \par -s/p treatment and is resolved. \par \par 5. Osteoporosis\par -prolia with endocrinology office - last 11/2022\par \par 6. HTN\par -carvedilol 3.125 mg BID, losartan 50 mg daily - bp controlled\par \par 7. HLD\par -rosuvastatin 10 mg daily \par \par 8. HCM\par -DEXA 2021 osteoporosis, on treatment\par -HIV/HCV declined last note 2020\par -covid vaccine completed, booster completed. \par -pneumonia vaccines UTD\par -tdap, shingrix - advise to obtain in pharmacy to avoid charge in office. pt wants to think about before obtaining\par -flu vaccine: \par \par 9. Hearing loss - audiology referral given last visit. ent referral given as TM appear sclerotic on exam today\par \par 10. Urinary incontinence; placed urogyn referral \par \par

## 2023-01-27 NOTE — H&P PST ADULT - NEGATIVE GASTROINTESTINAL SYMPTOMS
Nidia from [facility] Pre-admitting notified of both clearance letters available within [facility] and faxed to Dr. Morris.    no nausea/no vomiting/no diarrhea/no abdominal pain/no melena no nausea/no vomiting/no diarrhea/no abdominal pain

## 2023-01-31 ENCOUNTER — APPOINTMENT (OUTPATIENT)
Dept: UROGYNECOLOGY | Facility: CLINIC | Age: 88
End: 2023-01-31
Payer: MEDICARE

## 2023-01-31 VITALS
WEIGHT: 112 LBS | BODY MASS INDEX: 22.58 KG/M2 | HEART RATE: 70 BPM | HEIGHT: 59 IN | SYSTOLIC BLOOD PRESSURE: 150 MMHG | DIASTOLIC BLOOD PRESSURE: 69 MMHG

## 2023-01-31 DIAGNOSIS — R35.0 FREQUENCY OF MICTURITION: ICD-10-CM

## 2023-01-31 LAB
BILIRUB UR QL STRIP: NORMAL
CLARITY UR: CLEAR
COLLECTION METHOD: NORMAL
GLUCOSE UR-MCNC: NORMAL
HCG UR QL: 0.2 EU/DL
HGB UR QL STRIP.AUTO: NORMAL
KETONES UR-MCNC: NORMAL
LEUKOCYTE ESTERASE UR QL STRIP: NORMAL
NITRITE UR QL STRIP: NORMAL
PH UR STRIP: 7.5
PROT UR STRIP-MCNC: NORMAL
SP GR UR STRIP: 1.03

## 2023-01-31 PROCEDURE — 81003 URINALYSIS AUTO W/O SCOPE: CPT | Mod: QW

## 2023-01-31 PROCEDURE — 99204 OFFICE O/P NEW MOD 45 MIN: CPT | Mod: 25

## 2023-01-31 PROCEDURE — 51701 INSERT BLADDER CATHETER: CPT

## 2023-02-01 LAB
APPEARANCE: CLEAR
BACTERIA: NEGATIVE
BILIRUBIN URINE: NEGATIVE
BLOOD URINE: NEGATIVE
COLOR: NORMAL
GLUCOSE QUALITATIVE U: NEGATIVE
HYALINE CASTS: 0 /LPF
KETONES URINE: NEGATIVE
LEUKOCYTE ESTERASE URINE: NEGATIVE
MICROSCOPIC-UA: NORMAL
NITRITE URINE: NEGATIVE
PH URINE: 7.5
PROTEIN URINE: NEGATIVE
RED BLOOD CELLS URINE: 1 /HPF
SPECIFIC GRAVITY URINE: 1.01
SQUAMOUS EPITHELIAL CELLS: 0 /HPF
UROBILINOGEN URINE: NORMAL
WHITE BLOOD CELLS URINE: 0 /HPF

## 2023-02-01 NOTE — HISTORY OF PRESENT ILLNESS
[FreeTextEntry1] : SAJAN APARICIO is a 87 year old P4 presenting for evaluation of urinary incontinence. She reports a history of sling? 3-5 years ago at [facility], follow-up by Botox injection ~2 years ago. She has continued urgency and leakage since then. Tried Gemtesa for a few days, likely not long enough to see effects.  She voids >8 times times during the daytime and 4-5 times during the night. She also reports occasional stress urinary incontinence.\par \par Daily fluid intake:\par Water: ~66 oz plain water\par Tea: 1 cup (sometimes with lemon) in the evening\par \par PMH: HTN, constipation, diverticulosis, GERD, HLD, HCV, asthma, arthritis, osteoporosis\par PSH: sling?, cystoscopy, Botox\par OBGYN Hx:  x 4\par Social Hx: non-smoker

## 2023-02-01 NOTE — DISCUSSION/SUMMARY
[FreeTextEntry1] : #Overactive bladder: I counseled patient on the etiologies of overactive bladder using computer generated images. Management options were discussed, with emphasis on behavioral modifications, pelvic floor exercises or physical therapy, bladder training, medication. She desires: \par - Initiate behavioral modifications\par - RTO for UDS given h/o of sling\par - F/u UA/UCx\par - Possible longer trial of Gemtesa, however will defer further treatment until UDS

## 2023-02-01 NOTE — PROCEDURE
[Straight Catheterization] : insertion of a straight catheter [Urgent Incontinence] : urgent incontinence [Urinary Frequency] : urinary frequency [Patient] : the patient [Allergies Reviewed] : Allergies reviewed [None] : none [___ Fr Straight Tip] : a [unfilled] in [place] straight tip catheter [No Complications] : no complications [Tolerated Well] : the patient tolerated the procedure well [Post procedure instructions and information given] : Post procedure instructions and information were given and reviewed with patient. [0] : 0

## 2023-02-01 NOTE — PHYSICAL EXAM
[FreeTextEntry1] : . [No Acute Distress] : in no acute distress [Well developed] : well developed [Well Nourished] : ~L well nourished [Good Hygeine] : demonstrates good hygeine [Oriented x3] : oriented to person, place, and time [Normal Memory] : ~T memory was ~L unimpaired [Normal Mood/Affect] : mood and affect are normal [Anxiety] : patient is not anxious [Respirations regular] : ~T respiratory rate was regular [Mass (___ Cm)] : no ~M [unfilled] abdominal mass was palpated [Tenderness] : ~T no ~M abdominal tenderness observed [Vulvar Atrophy] : vulvar atrophy [Normal Appearance] : general appearance was normal [Atrophy] : atrophy [Dry Mucosa] : dry mucosa [Normal] : normal [Post Void Residual ____ml] : post void residual was [unfilled] ml

## 2023-02-02 DIAGNOSIS — Z82.49 FAMILY HISTORY OF ISCHEMIC HEART DISEASE AND OTHER DISEASES OF THE CIRCULATORY SYSTEM: ICD-10-CM

## 2023-02-02 DIAGNOSIS — Z87.09 PERSONAL HISTORY OF OTHER DISEASES OF THE RESPIRATORY SYSTEM: ICD-10-CM

## 2023-02-02 DIAGNOSIS — Z87.19 PERSONAL HISTORY OF OTHER DISEASES OF THE DIGESTIVE SYSTEM: ICD-10-CM

## 2023-02-02 DIAGNOSIS — Z83.42 FAMILY HISTORY OF FAMILIAL HYPERCHOLESTEROLEMIA: ICD-10-CM

## 2023-02-02 DIAGNOSIS — Z82.5 FAMILY HISTORY OF ASTHMA AND OTHER CHRONIC LOWER RESPIRATORY DISEASES: ICD-10-CM

## 2023-02-02 DIAGNOSIS — Z83.79 FAMILY HISTORY OF OTHER DISEASES OF THE DIGESTIVE SYSTEM: ICD-10-CM

## 2023-02-02 DIAGNOSIS — Z87.898 PERSONAL HISTORY OF OTHER SPECIFIED CONDITIONS: ICD-10-CM

## 2023-02-02 LAB — BACTERIA UR CULT: NORMAL

## 2023-02-09 ENCOUNTER — APPOINTMENT (OUTPATIENT)
Dept: OTOLARYNGOLOGY | Facility: CLINIC | Age: 88
End: 2023-02-09
Payer: MEDICARE

## 2023-02-09 VITALS
WEIGHT: 112 LBS | BODY MASS INDEX: 22.58 KG/M2 | SYSTOLIC BLOOD PRESSURE: 156 MMHG | HEIGHT: 59 IN | DIASTOLIC BLOOD PRESSURE: 62 MMHG | HEART RATE: 78 BPM | TEMPERATURE: 97.2 F

## 2023-02-09 DIAGNOSIS — H90.3 SENSORINEURAL HEARING LOSS, BILATERAL: ICD-10-CM

## 2023-02-09 PROCEDURE — 99204 OFFICE O/P NEW MOD 45 MIN: CPT

## 2023-02-09 PROCEDURE — 92567 TYMPANOMETRY: CPT

## 2023-02-09 PROCEDURE — 92557 COMPREHENSIVE HEARING TEST: CPT

## 2023-02-09 NOTE — ASSESSMENT
[FreeTextEntry1] : Patient diminished hearing audio moderate sensorineural hearing loss cleared for hearing aids symmetrical in nature follow-up as needed.

## 2023-02-09 NOTE — END OF VISIT
[FreeTextEntry3] : I, Dr. Oakes personally performed the evaluation and management (E/M) services including all necessary procedures, for this new patient. That E/M includes conducting the clinically appropriate initial history &/or exam, assessing all conditions, and establishing the plan of care. Today, my JAZMINE, Opal Harris, was here to observe &/or participate in the visit & follow plan of care established by me.\par \par \par

## 2023-02-09 NOTE — HISTORY OF PRESENT ILLNESS
[de-identified] : Patient comes in with several years  of hearing loss that has been worse recently. She has occasional mild noises in both ears that come and goes. \par SHe has a lot of nasal congestion that comes and goes in both nasal cavities.

## 2023-03-01 ENCOUNTER — APPOINTMENT (OUTPATIENT)
Dept: UROGYNECOLOGY | Facility: CLINIC | Age: 88
End: 2023-03-01
Payer: MEDICARE

## 2023-03-01 ENCOUNTER — RESULT CHARGE (OUTPATIENT)
Age: 88
End: 2023-03-01

## 2023-03-01 DIAGNOSIS — N30.00 ACUTE CYSTITIS W/OUT HEMATURIA: ICD-10-CM

## 2023-03-01 LAB
BILIRUB UR QL STRIP: NEGATIVE
CLARITY UR: CLEAR
COLLECTION METHOD: NORMAL
GLUCOSE UR-MCNC: NEGATIVE
HCG UR QL: 0.2 EU/DL
HGB UR QL STRIP.AUTO: NORMAL
KETONES UR-MCNC: NEGATIVE
LEUKOCYTE ESTERASE UR QL STRIP: NORMAL
NITRITE UR QL STRIP: NEGATIVE
PH UR STRIP: 6.5
PROT UR STRIP-MCNC: NEGATIVE
SP GR UR STRIP: 1.01

## 2023-03-01 PROCEDURE — 99214 OFFICE O/P EST MOD 30 MIN: CPT | Mod: 25

## 2023-03-01 PROCEDURE — 51701 INSERT BLADDER CATHETER: CPT

## 2023-03-01 PROCEDURE — 81003 URINALYSIS AUTO W/O SCOPE: CPT | Mod: QW

## 2023-03-04 LAB
APPEARANCE: CLEAR
BACTERIA: NEGATIVE
BILIRUBIN URINE: NEGATIVE
BLOOD URINE: NEGATIVE
COLOR: NORMAL
GLUCOSE QUALITATIVE U: NEGATIVE
HYALINE CASTS: 0 /LPF
KETONES URINE: NEGATIVE
LEUKOCYTE ESTERASE URINE: ABNORMAL
MICROSCOPIC-UA: NORMAL
NITRITE URINE: NEGATIVE
PH URINE: 6.5
PROTEIN URINE: NEGATIVE
RED BLOOD CELLS URINE: 1 /HPF
SPECIFIC GRAVITY URINE: 1.01
SQUAMOUS EPITHELIAL CELLS: 0 /HPF
UROBILINOGEN URINE: NORMAL
WHITE BLOOD CELLS URINE: 23 /HPF

## 2023-03-06 ENCOUNTER — APPOINTMENT (OUTPATIENT)
Dept: UROGYNECOLOGY | Facility: CLINIC | Age: 88
End: 2023-03-06

## 2023-03-07 ENCOUNTER — APPOINTMENT (OUTPATIENT)
Dept: PULMONOLOGY | Facility: CLINIC | Age: 88
End: 2023-03-07

## 2023-03-28 ENCOUNTER — OUTPATIENT (OUTPATIENT)
Dept: OUTPATIENT SERVICES | Facility: HOSPITAL | Age: 88
LOS: 1 days | End: 2023-03-28
Payer: MEDICARE

## 2023-03-28 ENCOUNTER — RESULT CHARGE (OUTPATIENT)
Age: 88
End: 2023-03-28

## 2023-03-28 ENCOUNTER — APPOINTMENT (OUTPATIENT)
Dept: UROGYNECOLOGY | Facility: CLINIC | Age: 88
End: 2023-03-28
Payer: MEDICARE

## 2023-03-28 VITALS — SYSTOLIC BLOOD PRESSURE: 178 MMHG | HEART RATE: 76 BPM | DIASTOLIC BLOOD PRESSURE: 81 MMHG

## 2023-03-28 DIAGNOSIS — Z01.818 ENCOUNTER FOR OTHER PREPROCEDURAL EXAMINATION: ICD-10-CM

## 2023-03-28 DIAGNOSIS — Z90.710 ACQUIRED ABSENCE OF BOTH CERVIX AND UTERUS: Chronic | ICD-10-CM

## 2023-03-28 DIAGNOSIS — Z98.890 OTHER SPECIFIED POSTPROCEDURAL STATES: Chronic | ICD-10-CM

## 2023-03-28 DIAGNOSIS — T14.8XXA OTHER INJURY OF UNSPECIFIED BODY REGION, INITIAL ENCOUNTER: Chronic | ICD-10-CM

## 2023-03-28 DIAGNOSIS — Z90.49 ACQUIRED ABSENCE OF OTHER SPECIFIED PARTS OF DIGESTIVE TRACT: Chronic | ICD-10-CM

## 2023-03-28 DIAGNOSIS — Z98.41 CATARACT EXTRACTION STATUS, RIGHT EYE: Chronic | ICD-10-CM

## 2023-03-28 LAB
BILIRUB UR QL STRIP: NEGATIVE
CLARITY UR: CLEAR
COLLECTION METHOD: NORMAL
GLUCOSE UR-MCNC: NEGATIVE
HCG UR QL: 0.2 EU/DL
HGB UR QL STRIP.AUTO: NORMAL
KETONES UR-MCNC: NEGATIVE
LEUKOCYTE ESTERASE UR QL STRIP: NEGATIVE
NITRITE UR QL STRIP: NEGATIVE
PH UR STRIP: 6.5
PROT UR STRIP-MCNC: NEGATIVE
SP GR UR STRIP: 1.01

## 2023-03-28 PROCEDURE — 81003 URINALYSIS AUTO W/O SCOPE: CPT | Mod: QW

## 2023-03-28 PROCEDURE — 51729 CYSTOMETROGRAM W/VP&UP: CPT

## 2023-03-28 PROCEDURE — 51797 INTRAABDOMINAL PRESSURE TEST: CPT

## 2023-03-28 PROCEDURE — 51797 INTRAABDOMINAL PRESSURE TEST: CPT | Mod: 26

## 2023-03-28 PROCEDURE — 51729 CYSTOMETROGRAM W/VP&UP: CPT | Mod: 26

## 2023-03-28 PROCEDURE — 51784 ANAL/URINARY MUSCLE STUDY: CPT

## 2023-03-28 PROCEDURE — 51784 ANAL/URINARY MUSCLE STUDY: CPT | Mod: 26

## 2023-03-29 LAB
APPEARANCE: CLEAR
BACTERIA: NEGATIVE
BILIRUBIN URINE: NEGATIVE
BLOOD URINE: NEGATIVE
COLOR: COLORLESS
GLUCOSE QUALITATIVE U: NEGATIVE
HYALINE CASTS: 0 /LPF
KETONES URINE: NEGATIVE
LEUKOCYTE ESTERASE URINE: NEGATIVE
MICROSCOPIC-UA: NORMAL
NITRITE URINE: NEGATIVE
PH URINE: 6.5
PROTEIN URINE: NEGATIVE
RED BLOOD CELLS URINE: 1 /HPF
SPECIFIC GRAVITY URINE: 1
SQUAMOUS EPITHELIAL CELLS: 0 /HPF
UROBILINOGEN URINE: NORMAL
WHITE BLOOD CELLS URINE: 0 /HPF

## 2023-03-31 ENCOUNTER — NON-APPOINTMENT (OUTPATIENT)
Age: 88
End: 2023-03-31

## 2023-04-03 ENCOUNTER — NON-APPOINTMENT (OUTPATIENT)
Age: 88
End: 2023-04-03

## 2023-04-05 ENCOUNTER — APPOINTMENT (OUTPATIENT)
Dept: UROGYNECOLOGY | Facility: CLINIC | Age: 88
End: 2023-04-05
Payer: MEDICARE

## 2023-04-05 VITALS
DIASTOLIC BLOOD PRESSURE: 59 MMHG | BODY MASS INDEX: 22.58 KG/M2 | WEIGHT: 112 LBS | SYSTOLIC BLOOD PRESSURE: 146 MMHG | HEART RATE: 77 BPM | HEIGHT: 59 IN

## 2023-04-05 DIAGNOSIS — N39.3 STRESS INCONTINENCE (FEMALE) (MALE): ICD-10-CM

## 2023-04-05 PROCEDURE — 99213 OFFICE O/P EST LOW 20 MIN: CPT

## 2023-04-05 NOTE — DISCUSSION/SUMMARY
[FreeTextEntry1] : We reviewed UDS results and discussed again management option for overactive bladder. We discussed pelvic floor therapy/bladder training, PTNS, prolonged trial of medication (Gemtesa - had only tried for a few days). She has previously tried Botox without any improvement in her symptoms. At this time, she will try pelvic floor PT as well as PTNS. Referral placed to [facility] and she was given list of locations with requisition form. She will also schedule PTNS. RTO 6 weeks after initial PTNS.

## 2023-04-05 NOTE — HISTORY OF PRESENT ILLNESS
[FreeTextEntry1] : Lexis presents for follow up of OAB. She is s/p UDS given h/o of sling which demonstrated significantly reduced bladder capacity, DO and NARESH. She is mostly bothered by overactivity. S/p trial of Botox injection in the past. Also tried Gemtesa for short period of time.

## 2023-04-08 ENCOUNTER — NON-APPOINTMENT (OUTPATIENT)
Age: 88
End: 2023-04-08

## 2023-04-26 ENCOUNTER — APPOINTMENT (OUTPATIENT)
Dept: UROGYNECOLOGY | Facility: CLINIC | Age: 88
End: 2023-04-26
Payer: MEDICARE

## 2023-04-26 PROCEDURE — 64566 NEUROELTRD STIM POST TIBIAL: CPT

## 2023-04-27 ENCOUNTER — APPOINTMENT (OUTPATIENT)
Dept: PULMONOLOGY | Facility: CLINIC | Age: 88
End: 2023-04-27
Payer: MEDICARE

## 2023-04-27 VITALS
HEART RATE: 70 BPM | DIASTOLIC BLOOD PRESSURE: 60 MMHG | WEIGHT: 112 LBS | HEIGHT: 59 IN | RESPIRATION RATE: 18 BRPM | BODY MASS INDEX: 22.58 KG/M2 | OXYGEN SATURATION: 98 % | SYSTOLIC BLOOD PRESSURE: 110 MMHG | TEMPERATURE: 97.1 F

## 2023-04-27 DIAGNOSIS — K21.9 GASTRO-ESOPHAGEAL REFLUX DISEASE W/OUT ESOPHAGITIS: ICD-10-CM

## 2023-04-27 DIAGNOSIS — J45.909 UNSPECIFIED ASTHMA, UNCOMPLICATED: ICD-10-CM

## 2023-04-27 PROCEDURE — 94729 DIFFUSING CAPACITY: CPT

## 2023-04-27 PROCEDURE — 99214 OFFICE O/P EST MOD 30 MIN: CPT | Mod: 25

## 2023-04-27 PROCEDURE — 94010 BREATHING CAPACITY TEST: CPT

## 2023-04-27 PROCEDURE — 95012 NITRIC OXIDE EXP GAS DETER: CPT

## 2023-04-27 PROCEDURE — 94727 GAS DIL/WSHOT DETER LNG VOL: CPT

## 2023-04-27 RX ORDER — AMOXICILLIN AND CLAVULANATE POTASSIUM 875; 125 MG/1; MG/1
875-125 TABLET, COATED ORAL
Qty: 14 | Refills: 0 | Status: DISCONTINUED | COMMUNITY
Start: 2023-03-31 | End: 2023-04-27

## 2023-04-27 RX ORDER — TIOTROPIUM BROMIDE AND OLODATEROL 3.124; 2.736 UG/1; UG/1
2.5-2.5 SPRAY, METERED RESPIRATORY (INHALATION) DAILY
Qty: 3 | Refills: 1 | Status: ACTIVE | COMMUNITY
Start: 2023-04-27 | End: 1900-01-01

## 2023-04-27 NOTE — REASON FOR VISIT
[Follow-Up] : a follow-up visit [Abnormal CXR/ Chest CT] : an abnormal CXR/ chest CT [TextBox_44] : SOB, mild to moderate persistent asthma, allergies, GERD, idiopathic urticaria, poor sleep \par

## 2023-04-27 NOTE — PROCEDURE
[FreeTextEntry1] : Full PFT revealed mild obstructive dysfunction at mid-low lung volumes, with a FEV1 of 1.31L, which is 97% of predicted, normal lung volumes, and a diffusion of 11.3, which is 89% of predicted, with a normal flow volume loop. -PFTs performed today for evaluation of asthma and SOB (04/27/2023) \par \par Feno was 20; a normal value being less than 25. Fractional exhaled nitric oxide (FENO) is regarded as a simple, noninvasive method for assessing eosinophilic airway inflammation. Produced by a variety of cells within the lung, nitric oxide (NO) concentrations are generally low in healthy individuals. However, high concentrations of NO appear to be involved in nonspecific host defense mechanisms and chronic inflammatory  diseases such as asthma. The American Thoracic Society (ATS) therefore recommended using FENO to aid in the diagnosis and monitoring of eosinophilic airway inflammation and asthma, and for identifying steroid responsive individuals whose chronic respiratory symptoms may be caused by airway inflammation

## 2023-04-27 NOTE — ADDENDUM
[FreeTextEntry1] : Documented by Tyrel Arellano acting as a scribe for Dr. Bentley Hall on 04/27/2023.\par \par All medical record entries made by the Scribe were at my, Dr. Bentley Hall's, direction and personally dictated by me on 04/27/2023. I have reviewed the chart and agree that the record accurately reflects my personal performance of the history, physical exam, assessment and plan. I have also personally directed, reviewed, and agree with the discharge instructions.

## 2023-04-27 NOTE — HISTORY OF PRESENT ILLNESS
[TextBox_4] : Ms. APARICIO is a 87 year old female with a history of originally form [place], secondary smoking exposure, CAD, diverticulitis, dermatographism, GERD, arthritis, osteoporosis, ?OW, low VItamin D, asthma, elevated cholesterol, hepatitis C, kidney stone presenting to the office today for a follow-up pulmonary evaluation. Her chief complaint is\par \par -s/p fall April 1st and she hit her head\par -she notes back pain\par -she notes neck discomfort \par -she notes rib pain has improved\par -she notes problems with sleep at times\par -she notes her weight is stable \par -she notes that her appetite is good \par -she notes incontinence which is her biggest complaint\par -she notes rash\par \par \par -patient denies any headaches, nausea, vomiting, fever, chills, sweats, chest pain, chest pressure, palpitations, coughing, wheezing, fatigue, diarrhea, constipation, dysphagia, dizziness, leg swelling, itchy eyes, itchy ears, heartburn, reflux or sour taste in the mouth

## 2023-04-27 NOTE — PHYSICAL EXAM
[No Acute Distress] : no acute distress [III] : Mallampati Class: III [Normal Oropharynx] : normal oropharynx [Normal Appearance] : normal appearance [No Neck Mass] : no neck mass [Normal Rate/Rhythm] : normal rate/rhythm [Normal S1, S2] : normal s1, s2 [No Murmurs] : no murmurs [No Resp Distress] : no resp distress [No Abnormalities] : no abnormalities [Benign] : benign [Normal Gait] : normal gait [No Clubbing] : no clubbing [No Cyanosis] : no cyanosis [No Edema] : no edema [FROM] : FROM [Normal Color/ Pigmentation] : normal color/ pigmentation [No Focal Deficits] : no focal deficits [Oriented x3] : oriented x3 [Normal Affect] : normal affect [TextBox_68] : I:E ratio 1:3; clear

## 2023-04-27 NOTE — ASSESSMENT
[FreeTextEntry1] : Ms. APARICIO is a 87 year old female with a history of originally form [place], secondary smoking exposure, CAD, diverticulitis, dermatographism, GERD, arthritis, osteoporosis, low VItamin D, asthma, elevated cholesterol, hepatitis C, kidney stone  presenting to the office today for a follow-up pulmonary evaluation for SOB, mild to moderate persistent asthma, allergies, GERD, idiopathic urticaria, poor sleep - active asthma, s/p hospitalization UTI [place] (post COVID-19 4/2022)- iss/p asthmatic bronchitis (11/2022) - #1 issue is post concussion Sx\par \par Her shortness of breath is multifactorial due to:\par -poor mechanics of breathing \par -out of shape \par -pulmonary disease\par    -mild to moderate persistent asthma \par -?cardiac disease \par \par problem 1: mild to moderate persistent asthma -compliant\par -s/p Prednisone 20mg for 7 days then 10mg for 7 days (11/2022)\par -continue generic Symbicort 160 2 inhalations BID \par -Transition from Bevespi 2 puffs BID (first) to Stiolto 2 Puffs BID if insurance unable to cover Bevespi\par -Transition from Flovent  BID (second)\par -continue Albuterol via nebulizer, Q6H\par -continue Singulair 10 mg QHS \par -Asthma is  believed to be caused by inherited (genetic) and environmental factor, but its exact cause is unknown. Asthma may be triggered by allergens, lung infections, or irritants in the air. Asthma triggers are different for each person \par -Inhaler technique reviewed as well as oral hygiene techniques reviewed with patient. Avoidance of cold air, extremes of temperature, rescue inhaler should be used before exercise. Order of medication reviewed with patient. Recommended use of a cool mist humidifier in the bedroom. \par -Information sheet given to the patient to be reviewed, this medication is never to be used without consulting the prescribing physician. Proper dietary restraint is necessary specifically salt containing foods, if any reaction may occur should be reported. \par \par problem 2: allergies/ sinus \par -continue Allegra 180 mg QAM \par -s/p Blood work to include: asthma panel (+), food IgE panel (+), IgE level (-), eosinophil level (-), vitamin D level (-) \par -Environmental measures for allergies were encouraged including mattress and pillow covers, air purifier, and environmental controls. \par \par problem 3: GERD (active)\par -add Pepcid 40 mg QHS \par -continue Omeprazole 20 mg QAM, pre-meal \par -Rule of 2s: avoid eating too much, eating too late, eating too spicy, eating two hours before bed.\par -Things to avoid including overeating, spicy foods, tight clothing, eating within three hours of bed, this list is not all inclusive. \par -For treatment of reflux, possible options discussed including diet control, H2 blockers, PPIs, as well as coating motility agents discussed as treatment options. Timing of meals and proximity of last meal to sleep were discussed. If symptoms persist, a formal gastrointestinal evaluation is needed. \par \par Problem 4:idiopathic urticaria\par -s/p IgE level blood test (-)\par -continue hydroxyzine 10 mg Q8H\par \par Problem 5 poor sleep ?CHRIS\par -continue hydroxyzine 10mg QHS\par -complete home sleep study \par Sleep apnea is associated with adverse clinical consequences which can affect most organ systems.  Cardiovascular disease risk includes arrhythmias, atrial fibrillation, hypertension, coronary artery disease, and stroke. Metabolic disorders include diabetes type 2, non-alcoholic fatty liver disease. Mood disorder especially depression; and cognitive decline especially in the elderly. Associations with  chronic reflux/Reich’s esophagus some but not all inclusive. \par -Reasons  include arousal consistent with hypopnea; respiratory events most prominent in REM sleep or supine position; therefore sleep staging and body position are important for accurate diagnosis and estimation of AHI. \par \par Problem 6 Low Vitamin D\par -continue Vitamin D supplements \par Low vitamin D levels have been associated with asthma exacerbations and increased allergic symptoms. The goal based on recent information is maintaining levels between 50-70 and low normal is 30. Recommended 50,000 units every two weeks to once a month depending on the level. \par problem 6: cardiac disease\par -recommended to continue to follow up with Cardiologist \par \par problem 7: poor breathing mechanics\par -Recommended Wim Hof and Buteyko breathing techniques \par -Proper breathing techniques were reviewed with an emphasis of exhalation. Patient instructed to breath in for 1 second and out for four seconds. Patient was encouraged to not talk while walking. \par \par problem 8: out of shape \par -Weight loss, exercise, and diet control were discussed and are highly encouraged. Treatment options were given such as, aqua therapy, and contacting a nutritionist. Recommended to use the elliptical, stationary bike, less use of treadmill. Mindful eating was explained to the patient Obesity is associated with worsening asthma, shortness of breath, and potential for cardiac disease, diabetes, and other underlying medical conditions\par \par Problem 9B: Back pain c/w MS\par -CXR- clear \par \par problem 9: health maintenance \par -recommended probiotic course\par -s/p COVID infection 4/2022 - Paxlovid \par -Recommended not to get an additional COVID-19 booster at this time until it is updated against the current variants \par -s/p COVID-19 vaccine x3 \par -refused yearly flu shot - 2022\par -recommended strep pneumonia vaccines: Prevnar-13 vaccine, followed by Pneumo vaccine 23 one year following after 65 (completed)\par -recommended early intervention for Upper Respiratory Infections (URIs)\par -recommended regular osteoporosis evaluations\par -recommended early dermatological evaluations\par -recommended after the age of 50 to the age of 70, colonoscopy every 5 years\par \par F/U in 6-8 weeks.\par She is encouraged to call with any changes, concerns, or questions\par

## 2023-05-02 ENCOUNTER — APPOINTMENT (OUTPATIENT)
Dept: MRI IMAGING | Facility: CLINIC | Age: 88
End: 2023-05-02
Payer: MEDICARE

## 2023-05-02 ENCOUNTER — OUTPATIENT (OUTPATIENT)
Dept: OUTPATIENT SERVICES | Facility: HOSPITAL | Age: 88
LOS: 1 days | End: 2023-05-02
Payer: MEDICARE

## 2023-05-02 DIAGNOSIS — T14.8XXA OTHER INJURY OF UNSPECIFIED BODY REGION, INITIAL ENCOUNTER: Chronic | ICD-10-CM

## 2023-05-02 DIAGNOSIS — Z98.890 OTHER SPECIFIED POSTPROCEDURAL STATES: Chronic | ICD-10-CM

## 2023-05-02 DIAGNOSIS — Z98.41 CATARACT EXTRACTION STATUS, RIGHT EYE: Chronic | ICD-10-CM

## 2023-05-02 DIAGNOSIS — M51.27 OTHER INTERVERTEBRAL DISC DISPLACEMENT, LUMBOSACRAL REGION: ICD-10-CM

## 2023-05-02 DIAGNOSIS — Z90.49 ACQUIRED ABSENCE OF OTHER SPECIFIED PARTS OF DIGESTIVE TRACT: Chronic | ICD-10-CM

## 2023-05-02 DIAGNOSIS — Z90.710 ACQUIRED ABSENCE OF BOTH CERVIX AND UTERUS: Chronic | ICD-10-CM

## 2023-05-02 PROCEDURE — G1004: CPT

## 2023-05-02 PROCEDURE — 72148 MRI LUMBAR SPINE W/O DYE: CPT | Mod: ME

## 2023-05-02 PROCEDURE — 72148 MRI LUMBAR SPINE W/O DYE: CPT | Mod: 26,ME

## 2023-05-03 ENCOUNTER — APPOINTMENT (OUTPATIENT)
Dept: PHARMACY | Facility: CLINIC | Age: 88
End: 2023-05-03
Payer: SELF-PAY

## 2023-05-03 ENCOUNTER — APPOINTMENT (OUTPATIENT)
Dept: UROGYNECOLOGY | Facility: CLINIC | Age: 88
End: 2023-05-03
Payer: MEDICARE

## 2023-05-03 VITALS
WEIGHT: 113 LBS | DIASTOLIC BLOOD PRESSURE: 77 MMHG | BODY MASS INDEX: 22.78 KG/M2 | OXYGEN SATURATION: 98 % | SYSTOLIC BLOOD PRESSURE: 116 MMHG | HEART RATE: 83 BPM | HEIGHT: 59 IN

## 2023-05-03 PROCEDURE — V5010 ASSESSMENT FOR HEARING AID: CPT | Mod: NC

## 2023-05-03 PROCEDURE — 64566 NEUROELTRD STIM POST TIBIAL: CPT

## 2023-05-03 NOTE — ASSESSMENT
[FreeTextEntry1] : Discussed different styles, manufacturers, levels of technology and various features including direct streaming and rechargeable options. Discussed pros vs cons of ITC vs. AQUILES- she expressed understanding. \par Performed DEMO of Oticon More 1 miniRITE-R. Set to adaptation level 3 and fit with SPENCER FOURNIER. Patient reported improvement in overall hearing ability and clarity, however she noted an echo when she spoke. Counseled re: with consistent use and acclimatization, echo will likely diminish. \par \par Informed patient of [facility] Hearing benefit. Discussed differences between [facility] and obtaining devices through [facility] directly. Patient expressed understanding and chose to contact [facility] to obtain more information.

## 2023-05-03 NOTE — HISTORY OF PRESENT ILLNESS
[Difficulty hearing in group setting] : difficulty hearing in group setting [Drainage from ear] : no drainage from ear [Ear, Nose or Throat Surgery] : no ear, nose or throat surgery [Hearing Aid ___] : no hearing aid(s) [FreeTextEntry1] : 86 yo female referred by Dr. Oakes for hearing aid evaluation. Patient presents with essentially moderate to severe SNHL with 80/84% speech understanding. She reports difficulties hearing for several years. Difficulties include groups, noise, TV, and most individual speakers. She has never worn hearing aids. \par + tinnitus- occasional and worse AS

## 2023-05-10 ENCOUNTER — APPOINTMENT (OUTPATIENT)
Dept: UROGYNECOLOGY | Facility: CLINIC | Age: 88
End: 2023-05-10
Payer: MEDICARE

## 2023-05-10 PROCEDURE — 64566 NEUROELTRD STIM POST TIBIAL: CPT

## 2023-05-16 ENCOUNTER — APPOINTMENT (OUTPATIENT)
Dept: ENDOCRINOLOGY | Facility: CLINIC | Age: 88
End: 2023-05-16
Payer: MEDICARE

## 2023-05-16 VITALS
BODY MASS INDEX: 22.98 KG/M2 | DIASTOLIC BLOOD PRESSURE: 60 MMHG | WEIGHT: 114 LBS | OXYGEN SATURATION: 96 % | SYSTOLIC BLOOD PRESSURE: 124 MMHG | HEART RATE: 76 BPM | HEIGHT: 59 IN

## 2023-05-16 PROCEDURE — 96372 THER/PROPH/DIAG INJ SC/IM: CPT

## 2023-05-16 PROCEDURE — 99214 OFFICE O/P EST MOD 30 MIN: CPT | Mod: 25

## 2023-05-16 RX ORDER — DENOSUMAB 60 MG/ML
60 INJECTION SUBCUTANEOUS
Qty: 1 | Refills: 0 | Status: COMPLETED | OUTPATIENT
Start: 2023-05-16

## 2023-05-16 RX ADMIN — DENOSUMAB 0 MG/ML: 60 INJECTION SUBCUTANEOUS at 00:00

## 2023-05-16 NOTE — ASSESSMENT
[FreeTextEntry1] : Patient is an 87-year-old woman with history of prediabetes, osteoporosis, hypertension, hyperlipidemia, here for endocrinology follow-up.\par \par 1.  Osteoporosis\par Patient with history of severe osteoporosis with history of rib fracture.  She is at a high risk of fracture secondary to poor balance at times, also history of rheumatoid arthritis.\par She has deferred treatment for many years and was restarted on Prolia last year.\par ONJ/AF side effects were discussed.  No plan for major dental work.\par .\par L2-L4\par 9/6/2022 BMD 0.659, T score -3.8, +7.8% compared to 2021\par 3/12/2021: BMD 0.691, T score -4.3\par Total hip: 9/6/2022: BMD 0.5189, T score -2.8, +2.3% compared to 2021\par 3/12/2021: BMD 0.575, T score -2.8\par Femoral neck\par 9/6/2022: BMD 0.473, T score -3.4, +6.8% compared to 2021\par 3/12/2021: BMD 0.443, T score -3.7\par \par 2. Prediabetes\par Repeat A1c level.\par Continue with diet and exercise.\par \par 3. HLD\par Patient is currently on statin therapy.\par \par 4. HTN\par Blood pressure goal <140/90.\par \par Risks and benefits of denosumab therapy were discussed with the patient including eczema, cellulitis, osteonecrosis of the jaw and atypical femur fractures. \par

## 2023-05-17 ENCOUNTER — APPOINTMENT (OUTPATIENT)
Dept: UROGYNECOLOGY | Facility: CLINIC | Age: 88
End: 2023-05-17
Payer: MEDICARE

## 2023-05-17 PROCEDURE — 64566 NEUROELTRD STIM POST TIBIAL: CPT

## 2023-05-25 ENCOUNTER — APPOINTMENT (OUTPATIENT)
Dept: GASTROENTEROLOGY | Facility: CLINIC | Age: 88
End: 2023-05-25
Payer: MEDICARE

## 2023-05-25 VITALS
SYSTOLIC BLOOD PRESSURE: 124 MMHG | BODY MASS INDEX: 22.98 KG/M2 | HEART RATE: 88 BPM | WEIGHT: 114 LBS | HEIGHT: 59 IN | TEMPERATURE: 97.2 F | DIASTOLIC BLOOD PRESSURE: 78 MMHG | OXYGEN SATURATION: 96 %

## 2023-05-25 DIAGNOSIS — K59.09 OTHER CONSTIPATION: ICD-10-CM

## 2023-05-25 PROCEDURE — 99204 OFFICE O/P NEW MOD 45 MIN: CPT

## 2023-05-25 PROCEDURE — 99214 OFFICE O/P EST MOD 30 MIN: CPT

## 2023-05-25 NOTE — REVIEW OF SYSTEMS
[Feeling Poorly] : not feeling poorly [Feeling Tired] : not feeling tired [Recent Weight Loss (___ Lbs)] : no recent weight loss [Red Eyes] : eyes not red [Scleral Icterus (Yellow Eyes)] : no scleral icterus [Sore Throat] : no sore throat [Hoarseness] : no hoarseness [Chest Pain] : no chest pain [Palpitations] : no palpitations [Wheezing] : no wheezing [SOB on Exertion] : no shortness of breath during exertion [As Noted in HPI] : as noted in HPI [Arthralgias (joint pain)] : no arthralgias [Limb Swelling] : no limb swelling [Itching] : no itching [Jaundice (yellowing of skin)] : no jaundice [Dizziness] : no dizziness [Fainting] : no fainting [Anxiety] : no anxiety [Depression] : no depression [Easy Bleeding] : no tendency for easy bleeding [Easy Bruising] : no tendency for easy bruising

## 2023-05-25 NOTE — HISTORY OF PRESENT ILLNESS
[FreeTextEntry1] : 87 year old woman with hx of chronic constipation, diverticulosis, GERD, arthritis, osteoporosis, low vitamin D. HLD, HCV, nephrolithiasis, asthma presents for evaluation of constipation.\par \par Patient reports having hard bowel movements roughly every other day associated with nausea & a few episodes of NBNB emesis. She also reports having some lower abdominal and back discomfort, which improves with bowel movements. Patient reports she was in her usual state of health and had daily soft bowel movements while taking Linzess, but stopped the medication a few months ago. \par \par Patient denies fevers, chills, chest pain, SOB, diarrhea, melena, hematochezia, hematemesis, dysphagia, odynophagia, headache, dizziness, and recent travel.

## 2023-05-25 NOTE — ASSESSMENT
[FreeTextEntry1] : Impression:\par # Constipation associated with abdominal pain: Previously well controlled with Linzess\par # Diverticulosis\par \par Plan:\par - Resume Linzess\par - Educated on appropriate diet given diverticular strictures reported prior colonoscopy\par - RTC in one month

## 2023-05-25 NOTE — PHYSICAL EXAM
[Alert] : alert [Normal Voice/Communication] : normal voice/communication [Healthy Appearing] : healthy appearing [No Acute Distress] : no acute distress [Sclera] : the sclera and conjunctiva were normal [Hearing Threshold Finger Rub Not [place]] : hearing was normal [Normal Lips/Gums] : the lips and gums were normal [Normal Appearance] : the appearance of the neck was normal [No Respiratory Distress] : no respiratory distress [No Acc Muscle Use] : no accessory muscle use [Respiration, Rhythm And Depth] : normal respiratory rhythm and effort [Heart Rate And Rhythm] : heart rate was normal and rhythm regular [Normal S1, S2] : normal S1 and S2 [Murmurs] : no murmurs [Bowel Sounds] : normal bowel sounds [Abdomen Tenderness] : non-tender [No Masses] : no abdominal mass palpated [Abdomen Soft] : soft [Cervical Lymph Nodes Enlarged Posterior Bilaterally] : no posterior cervical lymphadenopathy [Cervical Lymph Nodes Enlarged Anterior Bilaterally] : no anterior cervical lymphadenopathy [No CVA Tenderness] : no CVA  tenderness [No Spinal Tenderness] : no spinal tenderness [Abnormal Walk] : normal gait [Involuntary Movements] : no involuntary movements were seen [] : no rash [Skin Lesions] : no skin lesions [No Focal Deficits] : no focal deficits [Motor Exam] : the motor exam was normal [Oriented To Time, Place, And Person] : oriented to person, place, and time [Normal Affect] : the affect was normal [Normal Mood] : the mood was normal

## 2023-05-26 ENCOUNTER — OUTPATIENT (OUTPATIENT)
Dept: OUTPATIENT SERVICES | Facility: HOSPITAL | Age: 88
LOS: 1 days | End: 2023-05-26
Payer: MEDICARE

## 2023-05-26 ENCOUNTER — APPOINTMENT (OUTPATIENT)
Dept: MRI IMAGING | Facility: IMAGING CENTER | Age: 88
End: 2023-05-26
Payer: MEDICARE

## 2023-05-26 DIAGNOSIS — T14.8XXA OTHER INJURY OF UNSPECIFIED BODY REGION, INITIAL ENCOUNTER: Chronic | ICD-10-CM

## 2023-05-26 DIAGNOSIS — Z90.710 ACQUIRED ABSENCE OF BOTH CERVIX AND UTERUS: Chronic | ICD-10-CM

## 2023-05-26 DIAGNOSIS — Z98.41 CATARACT EXTRACTION STATUS, RIGHT EYE: Chronic | ICD-10-CM

## 2023-05-26 DIAGNOSIS — Z00.8 ENCOUNTER FOR OTHER GENERAL EXAMINATION: ICD-10-CM

## 2023-05-26 DIAGNOSIS — Z90.49 ACQUIRED ABSENCE OF OTHER SPECIFIED PARTS OF DIGESTIVE TRACT: Chronic | ICD-10-CM

## 2023-05-26 PROCEDURE — 72195 MRI PELVIS W/O DYE: CPT

## 2023-05-26 PROCEDURE — 72195 MRI PELVIS W/O DYE: CPT | Mod: 26,MH

## 2023-06-07 ENCOUNTER — APPOINTMENT (OUTPATIENT)
Dept: UROGYNECOLOGY | Facility: CLINIC | Age: 88
End: 2023-06-07
Payer: MEDICARE

## 2023-06-07 VITALS
WEIGHT: 115 LBS | BODY MASS INDEX: 23.18 KG/M2 | HEART RATE: 82 BPM | DIASTOLIC BLOOD PRESSURE: 75 MMHG | SYSTOLIC BLOOD PRESSURE: 116 MMHG | HEIGHT: 59 IN

## 2023-06-07 PROCEDURE — 99212 OFFICE O/P EST SF 10 MIN: CPT

## 2023-06-07 NOTE — DISCUSSION/SUMMARY
[FreeTextEntry1] : Pt would like to continue PTNS as well as pelvic floor PT for now. We discussed possibility of trial of medication again, but she is not interested in this at this time. She will follow-up after completing PTNS sessions to re-assess.

## 2023-06-07 NOTE — HISTORY OF PRESENT ILLNESS
[FreeTextEntry1] : Lexis is s/p 4 sessions of PTNS (failed Botox in the past). Hasn't seen any changes yet. She also started pelvic floor PT yesterday, had initial consultation.

## 2023-06-07 NOTE — ED ADULT TRIAGE NOTE - BRAND OF COVID-19 VACCINATION
Discharge Instructions  General  Expect some vaginal spotting to light vaginal bleeding that may last up to 1 week  Expect some mild cramping  May take shower or tub bath   May walk and climb stairs as tolerated    Follow-up   Continue stool softener or laxative if necessary  Call for appointment  for 2 weeks after your surgery  Call for fever, chills, increased bleeding, or any other concerns
Pfizer dose 1 and 2

## 2023-06-07 NOTE — PHYSICAL EXAM
[FreeTextEntry1] : General: Well, appearing, no acute distress\par HEENT: Normocephalic, atraumatic\par Respiratory: Speaking in full sentences comfortably, normal work of breathing and no cough during visit\par Neuro: Alert and oriented x 3, speech is fluent, normal rate\par Psych: Normal mood and affect\par

## 2023-06-16 ENCOUNTER — NON-APPOINTMENT (OUTPATIENT)
Age: 88
End: 2023-06-16

## 2023-06-16 ENCOUNTER — LABORATORY RESULT (OUTPATIENT)
Age: 88
End: 2023-06-16

## 2023-06-16 ENCOUNTER — APPOINTMENT (OUTPATIENT)
Dept: CARDIOLOGY | Facility: CLINIC | Age: 88
End: 2023-06-16
Payer: MEDICARE

## 2023-06-16 VITALS
SYSTOLIC BLOOD PRESSURE: 132 MMHG | OXYGEN SATURATION: 99 % | RESPIRATION RATE: 16 BRPM | HEART RATE: 78 BPM | BODY MASS INDEX: 21.97 KG/M2 | HEIGHT: 59 IN | TEMPERATURE: 98 F | WEIGHT: 109 LBS | DIASTOLIC BLOOD PRESSURE: 81 MMHG

## 2023-06-16 PROCEDURE — 93000 ELECTROCARDIOGRAM COMPLETE: CPT

## 2023-06-16 PROCEDURE — 99204 OFFICE O/P NEW MOD 45 MIN: CPT

## 2023-06-16 NOTE — PHYSICAL EXAM
[Well Developed] : well developed [Well Nourished] : well nourished [No Acute Distress] : no acute distress [Normal Conjunctiva] : normal conjunctiva [Normal Venous Pressure] : normal venous pressure [No Carotid Bruit] : no carotid bruit [Normal S1, S2] : normal S1, S2 [No Rub] : no rub [5th Left ICS - MCL] : palpated at the 5th LICS in the midclavicular line [Normal] : normal [No Precordial Heave] : no precordial heave was noted [Normal Rate] : normal [Rhythm Regular] : regular [Normal S1] : normal S1 [Normal S2] : normal S2 [No Gallop] : no gallop heard [II] : a grade 2 [No Pitting Edema] : no pitting edema present [2+] : left 2+ [No Abnormalities] : the abdominal aorta was not enlarged and no bruit was heard [Clear Lung Fields] : clear lung fields [Good Air Entry] : good air entry [No Respiratory Distress] : no respiratory distress  [Soft] : abdomen soft [Non Tender] : non-tender [No Masses/organomegaly] : no masses/organomegaly [Normal Gait] : normal gait [Normal Bowel Sounds] : normal bowel sounds [No Edema] : no edema [No Cyanosis] : no cyanosis [No Clubbing] : no clubbing [No Varicosities] : no varicosities [No Rash] : no rash [No Skin Lesions] : no skin lesions [Moves all extremities] : moves all extremities [No Focal Deficits] : no focal deficits [Normal Speech] : normal speech [Alert and Oriented] : alert and oriented [Normal memory] : normal memory [S3] : no S3 [S4] : no S4 [Right Carotid Bruit] : no bruit heard over the right carotid [Left Carotid Bruit] : no bruit heard over the left carotid [Right Femoral Bruit] : no bruit heard over the right femoral artery [Left Femoral Bruit] : no bruit heard over the left femoral artery

## 2023-06-16 NOTE — DISCUSSION/SUMMARY
[FreeTextEntry1] : This is an 88-year-old female with past medical history significant for hypertension, hyperlipidemia, asthma, murmur, status pos appendectomy, status post right inguinal hernia repair, status post right wrist surgery, right cataract surgery, hysterectomy, who comes in for cardiac consultation.\par She denies chest pain, shortness of breath, dizziness or syncope.  She was born in [facility] and has no history of rheumatic fever.  She does not drink excessive caffeine or alcohol.\par Cardiac risk factors include hypertension.\par Echo Doppler examination done June 2, 2021 demonstrated normal left ventricular ejection fraction of 63%, trace aortic valve regurgitation, mild mitral valve regurgitation, mild tricuspid valve regurgitation, trace pulmonic valve regurgitation.\par The patient will remain on her current dose of Coreg 3.125 twice per day.  Her blood pressure seems to be under adequate control.\par The patient had a normal pharmacologic nuclear stress test June 9, 2021\par The patient will have new blood work done today for lipid panel, electrolytes, SMA-20.\par She is currently hemodynamically stable and asymptomatic from cardiac standpoint.\par The patient understands that aerobic exercises must be increased to 40 minutes 4 times per week. A detailed discussion of lifestyle modification was done today. The patient has a good understanding of the diagnosis, and treatment plan. Lifestyle modification was also outlined.

## 2023-06-19 RX ORDER — CELECOXIB 200 MG/1
200 CAPSULE ORAL
Qty: 30 | Refills: 0 | Status: COMPLETED | COMMUNITY
Start: 2023-04-17 | End: 2023-06-19

## 2023-06-19 RX ORDER — HYDROCORTISONE 2.5% 25 MG/G
2.5 CREAM TOPICAL DAILY
Qty: 1 | Refills: 1 | Status: COMPLETED | COMMUNITY
Start: 2023-01-03 | End: 2023-06-19

## 2023-06-19 RX ORDER — ALBUTEROL SULFATE 90 UG/1
108 (90 BASE) INHALANT RESPIRATORY (INHALATION)
Qty: 1 | Refills: 2 | Status: COMPLETED | COMMUNITY
Start: 2020-05-12 | End: 2023-06-19

## 2023-06-19 RX ORDER — GLYCOPYRROLATE AND FORMOTEROL FUMARATE 9; 4.8 UG/1; UG/1
9-4.8 AEROSOL, METERED RESPIRATORY (INHALATION)
Qty: 3 | Refills: 1 | Status: COMPLETED | OUTPATIENT
Start: 2022-04-12 | End: 2023-06-19

## 2023-06-19 RX ORDER — LINACLOTIDE 290 UG/1
290 CAPSULE, GELATIN COATED ORAL
Qty: 30 | Refills: 3 | Status: COMPLETED | COMMUNITY
Start: 2022-05-25 | End: 2023-06-19

## 2023-06-19 RX ORDER — BUDESONIDE AND FORMOTEROL FUMARATE DIHYDRATE 160; 4.5 UG/1; UG/1
160-4.5 AEROSOL RESPIRATORY (INHALATION)
Qty: 3 | Refills: 1 | Status: COMPLETED | COMMUNITY
Start: 2022-11-01 | End: 2023-06-19

## 2023-06-19 RX ORDER — PANTOPRAZOLE 40 MG/1
40 TABLET, DELAYED RELEASE ORAL TWICE DAILY
Qty: 30 | Refills: 5 | Status: COMPLETED | COMMUNITY
Start: 2023-01-03 | End: 2023-06-19

## 2023-06-19 RX ORDER — FAMOTIDINE 40 MG/1
40 TABLET, FILM COATED ORAL
Qty: 90 | Refills: 1 | Status: COMPLETED | COMMUNITY
Start: 2022-12-27 | End: 2023-06-19

## 2023-06-19 RX ORDER — SULFAMETHOXAZOLE AND TRIMETHOPRIM 800; 160 MG/1; MG/1
800-160 TABLET ORAL TWICE DAILY
Qty: 6 | Refills: 0 | Status: COMPLETED | COMMUNITY
Start: 2023-03-01 | End: 2023-06-19

## 2023-06-19 RX ORDER — ALBUTEROL SULFATE 2.5 MG/3ML
(2.5 MG/3ML) SOLUTION RESPIRATORY (INHALATION)
Qty: 120 | Refills: 2 | Status: COMPLETED | COMMUNITY
Start: 2022-11-01 | End: 2023-06-19

## 2023-06-19 RX ORDER — CONJUGATED ESTROGENS 0.62 MG/G
0.62 CREAM VAGINAL
Qty: 30 | Refills: 0 | Status: COMPLETED | COMMUNITY
Start: 2021-10-29 | End: 2023-06-19

## 2023-06-19 RX ORDER — CIPROFLOXACIN HYDROCHLORIDE 250 MG/1
250 TABLET, FILM COATED ORAL
Qty: 6 | Refills: 0 | Status: COMPLETED | COMMUNITY
Start: 2023-01-04 | End: 2023-06-19

## 2023-06-19 RX ORDER — TIOTROPIUM BROMIDE AND OLODATEROL 3.124; 2.736 UG/1; UG/1
2.5-2.5 SPRAY, METERED RESPIRATORY (INHALATION) DAILY
Qty: 3 | Refills: 1 | Status: COMPLETED | COMMUNITY
Start: 2022-12-27 | End: 2023-06-19

## 2023-06-19 RX ORDER — PANTOPRAZOLE 40 MG/1
40 TABLET, DELAYED RELEASE ORAL
Qty: 90 | Refills: 0 | Status: COMPLETED | COMMUNITY
Start: 2022-11-22 | End: 2023-06-19

## 2023-06-19 RX ORDER — FLUTICASONE PROPIONATE 220 UG/1
220 AEROSOL, METERED RESPIRATORY (INHALATION)
Refills: 6 | Status: COMPLETED | COMMUNITY
Start: 2021-06-07 | End: 2023-06-19

## 2023-06-19 RX ORDER — CEPHALEXIN 500 MG/1
500 CAPSULE ORAL
Qty: 14 | Refills: 0 | Status: COMPLETED | COMMUNITY
Start: 2023-03-31 | End: 2023-06-19

## 2023-06-19 RX ORDER — MONTELUKAST 10 MG/1
10 TABLET, FILM COATED ORAL
Qty: 1 | Refills: 1 | Status: COMPLETED | COMMUNITY
Start: 2022-11-01 | End: 2023-06-19

## 2023-06-19 RX ORDER — ALBUTEROL SULFATE 90 UG/1
108 (90 BASE) AEROSOL, METERED RESPIRATORY (INHALATION)
Refills: 0 | Status: COMPLETED | COMMUNITY
End: 2023-06-19

## 2023-06-19 RX ORDER — HYDROXYZINE HYDROCHLORIDE 10 MG/1
10 TABLET ORAL
Qty: 360 | Refills: 1 | Status: COMPLETED | COMMUNITY
Start: 2023-04-27 | End: 2023-06-19

## 2023-06-19 RX ORDER — GLYCOPYRROLATE AND FORMOTEROL FUMARATE 9; 4.8 UG/1; UG/1
9-4.8 AEROSOL, METERED RESPIRATORY (INHALATION)
Qty: 1 | Refills: 5 | Status: COMPLETED | COMMUNITY
Start: 2022-05-13 | End: 2023-06-19

## 2023-06-19 RX ORDER — BUDESONIDE 0.5 MG/2ML
0.5 INHALANT ORAL TWICE DAILY
Qty: 60 | Refills: 3 | Status: COMPLETED | COMMUNITY
Start: 2022-11-01 | End: 2023-06-19

## 2023-06-22 ENCOUNTER — APPOINTMENT (OUTPATIENT)
Dept: UROGYNECOLOGY | Facility: CLINIC | Age: 88
End: 2023-06-22
Payer: MEDICARE

## 2023-06-22 DIAGNOSIS — R39.15 URGENCY OF URINATION: ICD-10-CM

## 2023-06-22 PROCEDURE — 64566 NEUROELTRD STIM POST TIBIAL: CPT

## 2023-06-27 ENCOUNTER — APPOINTMENT (OUTPATIENT)
Dept: UROGYNECOLOGY | Facility: CLINIC | Age: 88
End: 2023-06-27
Payer: MEDICARE

## 2023-06-27 VITALS
SYSTOLIC BLOOD PRESSURE: 134 MMHG | DIASTOLIC BLOOD PRESSURE: 80 MMHG | WEIGHT: 109 LBS | HEIGHT: 59 IN | BODY MASS INDEX: 21.97 KG/M2

## 2023-06-27 PROCEDURE — 64566 NEUROELTRD STIM POST TIBIAL: CPT

## 2023-07-05 ENCOUNTER — APPOINTMENT (OUTPATIENT)
Dept: UROGYNECOLOGY | Facility: CLINIC | Age: 88
End: 2023-07-05
Payer: MEDICARE

## 2023-07-05 VITALS — HEART RATE: 74 BPM | DIASTOLIC BLOOD PRESSURE: 80 MMHG | SYSTOLIC BLOOD PRESSURE: 140 MMHG | RESPIRATION RATE: 16 BRPM

## 2023-07-05 DIAGNOSIS — N32.81 OVERACTIVE BLADDER: ICD-10-CM

## 2023-07-05 PROCEDURE — 64566 NEUROELTRD STIM POST TIBIAL: CPT

## 2023-07-11 ENCOUNTER — APPOINTMENT (OUTPATIENT)
Dept: UROGYNECOLOGY | Facility: CLINIC | Age: 88
End: 2023-07-11
Payer: MEDICARE

## 2023-07-11 VITALS
HEART RATE: 64 BPM | HEIGHT: 59 IN | WEIGHT: 113 LBS | BODY MASS INDEX: 22.78 KG/M2 | SYSTOLIC BLOOD PRESSURE: 136 MMHG | DIASTOLIC BLOOD PRESSURE: 85 MMHG

## 2023-07-11 PROCEDURE — 64566 NEUROELTRD STIM POST TIBIAL: CPT

## 2023-08-01 NOTE — HEALTH RISK ASSESSMENT
[Patient reported bone density results were abnormal] : Patient reported bone density results were abnormal [Patient reported colonoscopy was normal] : Patient reported colonoscopy was normal [BoneDensityDate] : 09/22 [BoneDensityComments] : osteoporosis [ColonoscopyDate] : 08/21
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
## 2023-08-02 ENCOUNTER — APPOINTMENT (OUTPATIENT)
Dept: INTERNAL MEDICINE | Facility: CLINIC | Age: 88
End: 2023-08-02

## 2023-08-03 NOTE — H&P PST ADULT - LYMPH NODES
Patient presents for follow-up.  She was started on naltrexone 50 mg daily through [employer], however unfortunately her insurance recently stopped joseph with [employer].  She is also taking Wellbutrin 150 mg every morning. She reports that they also added metformin 500mg ER daily but she has not been taking it consistently due to diarrhea. She does feel some appetite suppression, has been on this for the last two months. She takes this in the mornings.   
detailed exam

## 2023-08-07 ENCOUNTER — APPOINTMENT (OUTPATIENT)
Dept: INTERNAL MEDICINE | Facility: CLINIC | Age: 88
End: 2023-08-07

## 2023-08-25 ENCOUNTER — APPOINTMENT (OUTPATIENT)
Dept: UROGYNECOLOGY | Facility: CLINIC | Age: 88
End: 2023-08-25
Payer: MEDICARE

## 2023-08-25 VITALS
HEART RATE: 64 BPM | HEIGHT: 59 IN | BODY MASS INDEX: 22.78 KG/M2 | SYSTOLIC BLOOD PRESSURE: 130 MMHG | DIASTOLIC BLOOD PRESSURE: 82 MMHG | WEIGHT: 113 LBS

## 2023-08-25 DIAGNOSIS — N39.41 URGE INCONTINENCE: ICD-10-CM

## 2023-08-25 PROCEDURE — 64566 NEUROELTRD STIM POST TIBIAL: CPT

## 2023-08-28 ENCOUNTER — APPOINTMENT (OUTPATIENT)
Dept: PULMONOLOGY | Facility: CLINIC | Age: 88
End: 2023-08-28
Payer: MEDICARE

## 2023-08-28 VITALS
WEIGHT: 113 LBS | BODY MASS INDEX: 22.78 KG/M2 | RESPIRATION RATE: 16 BRPM | HEIGHT: 59 IN | OXYGEN SATURATION: 98 % | DIASTOLIC BLOOD PRESSURE: 64 MMHG | TEMPERATURE: 96.7 F | SYSTOLIC BLOOD PRESSURE: 132 MMHG | HEART RATE: 93 BPM

## 2023-08-28 DIAGNOSIS — J45.909 UNSPECIFIED ASTHMA, UNCOMPLICATED: ICD-10-CM

## 2023-08-28 DIAGNOSIS — J30.9 ALLERGIC RHINITIS, UNSPECIFIED: ICD-10-CM

## 2023-08-28 DIAGNOSIS — J45.40 MODERATE PERSISTENT ASTHMA, UNCOMPLICATED: ICD-10-CM

## 2023-08-28 DIAGNOSIS — L50.1 IDIOPATHIC URTICARIA: ICD-10-CM

## 2023-08-28 DIAGNOSIS — R06.02 SHORTNESS OF BREATH: ICD-10-CM

## 2023-08-28 DIAGNOSIS — E55.9 VITAMIN D DEFICIENCY, UNSPECIFIED: ICD-10-CM

## 2023-08-28 DIAGNOSIS — Z72.820 SLEEP DEPRIVATION: ICD-10-CM

## 2023-08-28 PROCEDURE — 99214 OFFICE O/P EST MOD 30 MIN: CPT | Mod: 25

## 2023-08-28 PROCEDURE — 95012 NITRIC OXIDE EXP GAS DETER: CPT

## 2023-08-28 PROCEDURE — 94010 BREATHING CAPACITY TEST: CPT

## 2023-08-28 NOTE — ASSESSMENT
[FreeTextEntry1] : Ms. APARICIO is a 88 year old female with a history of originally form [place], secondary smoking exposure, CAD, diverticulitis, dermatographism, GERD, arthritis, osteoporosis, low Vitamin D, asthma, elevated cholesterol, hepatitis C, kidney stone  presenting to the office today for a follow-up pulmonary evaluation for SOB, mild to moderate persistent asthma, allergies, GERD, idiopathic urticaria, poor sleep - active asthma, s/p hospitalization UTI [place] (post COVID-19 4/2022)- iss/p asthmatic bronchitis (11/2022) - #1 issue is mild asthma   Her shortness of breath is multifactorial due to: -poor mechanics of breathing  -out of shape  -pulmonary disease    -mild to moderate persistent asthma  -?cardiac disease   problem 1: mild to moderate persistent asthma - Active  -s/p Prednisone 20mg for 7 days then 10mg for 7 days (11/2022) -continue generic Symbicort 160 2 inhalations BID (restart) -continue Albuterol via nebulizer, Q6H -continue Singulair 10 mg QHS  -Asthma is  believed to be caused by inherited (genetic) and environmental factor, but its exact cause is unknown. Asthma may be triggered by allergens, lung infections, or irritants in the air. Asthma triggers are different for each person  -Inhaler technique reviewed as well as oral hygiene techniques reviewed with patient. Avoidance of cold air, extremes of temperature, rescue inhaler should be used before exercise. Order of medication reviewed with patient. Recommended use of a cool mist humidifier in the bedroom.  -Information sheet given to the patient to be reviewed, this medication is never to be used without consulting the prescribing physician. Proper dietary restraint is necessary specifically salt containing foods, if any reaction may occur should be reported.   problem 2: allergies/ sinus  -continue Allegra 180 mg QAM  -s/p Blood work to include: asthma panel (+), food IgE panel (+), IgE level (-), eosinophil level (-), vitamin D level (-)  -Environmental measures for allergies were encouraged including mattress and pillow covers, air purifier, and environmental controls.   problem 3: GERD (semi-active) -add Pepcid 40 mg QHS  -continue Omeprazole 20 mg QAM, pre-meal  -Rule of 2s: avoid eating too much, eating too late, eating too spicy, eating two hours before bed. -Things to avoid including overeating, spicy foods, tight clothing, eating within three hours of bed, this list is not all inclusive.  -For treatment of reflux, possible options discussed including diet control, H2 blockers, PPIs, as well as coating motility agents discussed as treatment options. Timing of meals and proximity of last meal to sleep were discussed. If symptoms persist, a formal gastrointestinal evaluation is needed.   Problem 4:idiopathic urticaria -s/p IgE level blood test (-) -continue hydroxyzine 10 mg Q8H  Problem 5 poor sleep ?CHRIS -continue hydroxyzine 10mg QHS -complete home sleep study  -add Gabapentin 100 mg QSH Sleep apnea is associated with adverse clinical consequences which can affect most organ systems.  Cardiovascular disease risk includes arrhythmias, atrial fibrillation, hypertension, coronary artery disease, and stroke. Metabolic disorders include diabetes type 2, non-alcoholic fatty liver disease. Mood disorder especially depression; and cognitive decline especially in the elderly. Associations with  chronic reflux/Reich's esophagus some but not all inclusive.  -Reasons  include arousal consistent with hypopnea; respiratory events most prominent in REM sleep or supine position; therefore sleep staging and body position are important for accurate diagnosis and estimation of AHI.   Problem 6 Low Vitamin D -continue Vitamin D supplements  Low vitamin D levels have been associated with asthma exacerbations and increased allergic symptoms. The goal based on recent information is maintaining levels between 50-70 and low normal is 30. Recommended 50,000 units every two weeks to once a month depending on the level.  problem 6: cardiac disease -recommended to continue to follow up with Cardiologist   problem 7: poor breathing mechanics -Recommended Elzbieta Kidd and [place] breathing techniques  -Proper breathing techniques were reviewed with an emphasis of exhalation. Patient instructed to breath in for 1 second and out for four seconds. Patient was encouraged to not talk while walking.   problem 8: out of shape  -Weight loss, exercise, and diet control were discussed and are highly encouraged. Treatment options were given such as, aqua therapy, and contacting a nutritionist. Recommended to use the elliptical, stationary bike, less use of treadmill. Mindful eating was explained to the patient Obesity is associated with worsening asthma, shortness of breath, and potential for cardiac disease, diabetes, and other underlying medical conditions  Problem 9B: Back pain c/w MS -CXR- clear   problem 9: health maintenance  -recommended probiotic course -s/p COVID infection 4/2022 - Paxlovid  -Recommended not to get an additional COVID-19 booster at this time until it is updated against the current variants  -s/p COVID-19 vaccine x3  -refused yearly flu shot - 2022 -recommended strep pneumonia vaccines: Prevnar-13 vaccine, followed by Pneumo vaccine 23 one year following after 65 (completed) -recommended early intervention for Upper Respiratory Infections (URIs) -recommended regular osteoporosis evaluations -recommended early dermatological evaluations -recommended after the age of 50 to the age of 70, colonoscopy every 5 years  F/U in 6-8 weeks. She is encouraged to call with any changes, concerns, or questions

## 2023-08-28 NOTE — PHYSICAL EXAM
[No Acute Distress] : no acute distress [Normal Oropharynx] : normal oropharynx [III] : Mallampati Class: III [Normal Appearance] : normal appearance [No Neck Mass] : no neck mass [Normal Rate/Rhythm] : normal rate/rhythm [Normal S1, S2] : normal s1, s2 [No Murmurs] : no murmurs [No Resp Distress] : no resp distress [No Abnormalities] : no abnormalities [Benign] : benign [Normal Gait] : normal gait [No Clubbing] : no clubbing [No Cyanosis] : no cyanosis [No Edema] : no edema [FROM] : FROM [Normal Color/ Pigmentation] : normal color/ pigmentation [No Focal Deficits] : no focal deficits [Oriented x3] : oriented x3 [Normal Affect] : normal affect [Wheeze] : wheeze [TextBox_68] : I:E ratio 1:3; mild expiratory wheeze

## 2023-08-28 NOTE — PROCEDURE
[FreeTextEntry1] : PFTs revealed mild obstructive dysfunction; FEV1 was 0.99 L, which is 78.2% of predicted; normal flow volume loop. PFTs were performed to evaluate for Asthma   FENO was 9; a normal value being less than 25 Fractional exhaled nitric oxide (FENO) is regarded as a simple, noninvasive method for assessing eosinophilic airway inflammation. Produced by a variety of cells within the lung, nitric oxide (NO) concentrations are generally low in healthy individuals. However, high concentrations of NO appear to be involved in nonspecific host defense mechanisms and chronic inflammatory diseases such as asthma. The American Thoracic Society (ATS) therefore has recommended using FENO to aid in the diagnosis and monitoring of eosinophilic airway inflammation and asthma, and for identifying steroid responsive individuals whose chronic respiratory symptoms may be caused by airway inflammation.

## 2023-08-28 NOTE — ADDENDUM
[FreeTextEntry1] : Documented by Tarik Chong acting as a scribe for Dr. Bentley Hall on 08/28/2023.   All medical record entries made by the Scribe were at my, Dr. Bentley Hall's, direction and personally dictated by me on 08/28/2023. I have reviewed the chart and agree that the record accurately reflects my personal performance of the history, physical exam, assessment and plan. I have also personally directed, reviewed, and agree with the discharge instructions.

## 2023-08-28 NOTE — HISTORY OF PRESENT ILLNESS
[TextBox_4] : Ms. APARICIO is a 88 year old female with a history of originally form [place], secondary smoking exposure, CAD, diverticulitis, dermatographism, GERD, arthritis, osteoporosis, ?OW, low Vitamin D, asthma, elevated cholesterol, hepatitis C, kidney stone presenting to the office today for a follow-up pulmonary evaluation. Her chief complaint is  - she notes feeling okay - she notes constipation - she notes coughing - she notes during her recent travel to her home country the change of climate brought up her cough - she notes her cough is slightly productive with clear mucus - she notes slight congestion - she notes not exercising due to laziness - she notes using Flovent  - she notes not sleeping well due to her mind racing - she notes her reflux is controlled with medication   -she denies any headaches, nausea, emesis, fever, chills, sweats, chest pain, chest pressure, wheezing, palpitations, diarrhea, vertigo, dysphagia, heartburn, itchy eyes, itchy ears, leg swelling, leg pain, arthralgias, myalgias, or sour taste in the mouth.

## 2023-08-29 RX ORDER — BUDESONIDE AND FORMOTEROL FUMARATE DIHYDRATE 160; 4.5 UG/1; UG/1
160-4.5 AEROSOL RESPIRATORY (INHALATION) TWICE DAILY
Qty: 3 | Refills: 1 | Status: ACTIVE | COMMUNITY
Start: 2023-08-28 | End: 1900-01-01

## 2023-08-30 ENCOUNTER — NON-APPOINTMENT (OUTPATIENT)
Age: 88
End: 2023-08-30

## 2023-09-01 ENCOUNTER — APPOINTMENT (OUTPATIENT)
Dept: UROGYNECOLOGY | Facility: CLINIC | Age: 88
End: 2023-09-01
Payer: MEDICARE

## 2023-09-01 PROCEDURE — 64566 NEUROELTRD STIM POST TIBIAL: CPT

## 2023-09-06 ENCOUNTER — APPOINTMENT (OUTPATIENT)
Dept: OPHTHALMOLOGY | Facility: CLINIC | Age: 88
End: 2023-09-06
Payer: MEDICARE

## 2023-09-06 ENCOUNTER — NON-APPOINTMENT (OUTPATIENT)
Age: 88
End: 2023-09-06

## 2023-09-06 PROCEDURE — 92014 COMPRE OPH EXAM EST PT 1/>: CPT

## 2023-09-06 PROCEDURE — 92134 CPTRZ OPH DX IMG PST SGM RTA: CPT

## 2023-09-08 ENCOUNTER — APPOINTMENT (OUTPATIENT)
Dept: UROGYNECOLOGY | Facility: CLINIC | Age: 88
End: 2023-09-08
Payer: MEDICARE

## 2023-09-08 DIAGNOSIS — N32.81 OVERACTIVE BLADDER: ICD-10-CM

## 2023-09-08 PROCEDURE — 64566 NEUROELTRD STIM POST TIBIAL: CPT

## 2023-09-22 ENCOUNTER — APPOINTMENT (OUTPATIENT)
Dept: UROGYNECOLOGY | Facility: CLINIC | Age: 88
End: 2023-09-22

## 2023-11-03 ENCOUNTER — APPOINTMENT (OUTPATIENT)
Dept: CARDIOLOGY | Facility: CLINIC | Age: 88
End: 2023-11-03
Payer: MEDICARE

## 2023-11-03 ENCOUNTER — NON-APPOINTMENT (OUTPATIENT)
Age: 88
End: 2023-11-03

## 2023-11-03 VITALS
BODY MASS INDEX: 22.78 KG/M2 | WEIGHT: 113 LBS | HEART RATE: 92 BPM | TEMPERATURE: 98 F | HEIGHT: 59 IN | DIASTOLIC BLOOD PRESSURE: 86 MMHG | RESPIRATION RATE: 16 BRPM | OXYGEN SATURATION: 97 % | SYSTOLIC BLOOD PRESSURE: 141 MMHG

## 2023-11-03 PROCEDURE — 93000 ELECTROCARDIOGRAM COMPLETE: CPT

## 2023-11-03 PROCEDURE — 99214 OFFICE O/P EST MOD 30 MIN: CPT

## 2023-11-03 RX ORDER — CARVEDILOL 3.12 MG/1
3.12 TABLET, FILM COATED ORAL
Qty: 180 | Refills: 1 | Status: ACTIVE | COMMUNITY
Start: 2019-04-16 | End: 1900-01-01

## 2023-11-21 ENCOUNTER — APPOINTMENT (OUTPATIENT)
Dept: ENDOCRINOLOGY | Facility: CLINIC | Age: 88
End: 2023-11-21
Payer: MEDICARE

## 2023-11-21 VITALS
HEART RATE: 84 BPM | SYSTOLIC BLOOD PRESSURE: 130 MMHG | HEIGHT: 59 IN | DIASTOLIC BLOOD PRESSURE: 64 MMHG | OXYGEN SATURATION: 96 % | BODY MASS INDEX: 22.98 KG/M2 | WEIGHT: 114 LBS

## 2023-11-21 DIAGNOSIS — Z86.39 PERSONAL HISTORY OF OTHER ENDOCRINE, NUTRITIONAL AND METABOLIC DISEASE: ICD-10-CM

## 2023-11-21 PROCEDURE — 96372 THER/PROPH/DIAG INJ SC/IM: CPT

## 2023-11-21 PROCEDURE — 99214 OFFICE O/P EST MOD 30 MIN: CPT | Mod: 25

## 2023-11-21 RX ORDER — DENOSUMAB 60 MG/ML
60 INJECTION SUBCUTANEOUS
Qty: 1 | Refills: 0 | Status: COMPLETED | OUTPATIENT
Start: 2023-11-21

## 2023-11-21 RX ADMIN — DENOSUMAB 0 MG/ML: 60 INJECTION SUBCUTANEOUS at 00:00

## 2023-11-22 LAB
25(OH)D3 SERPL-MCNC: 67.5 NG/ML
ALBUMIN SERPL ELPH-MCNC: 4.4 G/DL
ALP BLD-CCNC: 67 U/L
ALT SERPL-CCNC: 15 U/L
ANION GAP SERPL CALC-SCNC: 13 MMOL/L
AST SERPL-CCNC: 17 U/L
BILIRUB SERPL-MCNC: 0.2 MG/DL
BUN SERPL-MCNC: 26 MG/DL
CALCIUM SERPL-MCNC: 10 MG/DL
CHLORIDE SERPL-SCNC: 102 MMOL/L
CO2 SERPL-SCNC: 23 MMOL/L
CREAT SERPL-MCNC: 1.36 MG/DL
EGFR: 37 ML/MIN/1.73M2
GLUCOSE SERPL-MCNC: 97 MG/DL
POTASSIUM SERPL-SCNC: 4.8 MMOL/L
PROT SERPL-MCNC: 7 G/DL
SODIUM SERPL-SCNC: 138 MMOL/L
T4 FREE SERPL-MCNC: 1.1 NG/DL
TSH SERPL-ACNC: 1.91 UIU/ML

## 2023-11-27 ENCOUNTER — OUTPATIENT (OUTPATIENT)
Dept: OUTPATIENT SERVICES | Facility: HOSPITAL | Age: 88
LOS: 1 days | End: 2023-11-27
Payer: MEDICARE

## 2023-11-27 ENCOUNTER — APPOINTMENT (OUTPATIENT)
Dept: ULTRASOUND IMAGING | Facility: CLINIC | Age: 88
End: 2023-11-27
Payer: MEDICARE

## 2023-11-27 DIAGNOSIS — Z86.39 PERSONAL HISTORY OF OTHER ENDOCRINE, NUTRITIONAL AND METABOLIC DISEASE: ICD-10-CM

## 2023-11-27 DIAGNOSIS — Z90.710 ACQUIRED ABSENCE OF BOTH CERVIX AND UTERUS: Chronic | ICD-10-CM

## 2023-11-27 DIAGNOSIS — T14.8XXA OTHER INJURY OF UNSPECIFIED BODY REGION, INITIAL ENCOUNTER: Chronic | ICD-10-CM

## 2023-11-27 DIAGNOSIS — M54.50 LOW BACK PAIN, UNSPECIFIED: ICD-10-CM

## 2023-11-27 DIAGNOSIS — Z98.890 OTHER SPECIFIED POSTPROCEDURAL STATES: Chronic | ICD-10-CM

## 2023-11-27 DIAGNOSIS — Z90.49 ACQUIRED ABSENCE OF OTHER SPECIFIED PARTS OF DIGESTIVE TRACT: Chronic | ICD-10-CM

## 2023-11-27 DIAGNOSIS — Z98.41 CATARACT EXTRACTION STATUS, RIGHT EYE: Chronic | ICD-10-CM

## 2023-11-27 PROCEDURE — 76536 US EXAM OF HEAD AND NECK: CPT

## 2023-11-27 PROCEDURE — 76536 US EXAM OF HEAD AND NECK: CPT | Mod: 26

## 2023-12-25 ENCOUNTER — RX RENEWAL (OUTPATIENT)
Age: 88
End: 2023-12-25

## 2023-12-25 RX ORDER — OMEPRAZOLE 20 MG/1
20 CAPSULE, DELAYED RELEASE ORAL DAILY
Qty: 90 | Refills: 3 | Status: ACTIVE | COMMUNITY
Start: 2018-10-05 | End: 1900-01-01

## 2023-12-28 ENCOUNTER — APPOINTMENT (OUTPATIENT)
Dept: PULMONOLOGY | Facility: CLINIC | Age: 88
End: 2023-12-28

## 2023-12-31 PROBLEM — Z23 NEED FOR 23-POLYVALENT PNEUMOCOCCAL POLYSACCHARIDE VACCINE: Status: RESOLVED | Noted: 2020-10-14 | Resolved: 2020-12-07

## 2024-01-05 ENCOUNTER — APPOINTMENT (OUTPATIENT)
Dept: CARDIOLOGY | Facility: CLINIC | Age: 89
End: 2024-01-05

## 2024-03-01 ENCOUNTER — APPOINTMENT (OUTPATIENT)
Dept: CARDIOLOGY | Facility: CLINIC | Age: 89
End: 2024-03-01
Payer: MEDICARE

## 2024-03-01 ENCOUNTER — LABORATORY RESULT (OUTPATIENT)
Age: 89
End: 2024-03-01

## 2024-03-01 VITALS
HEART RATE: 86 BPM | DIASTOLIC BLOOD PRESSURE: 67 MMHG | RESPIRATION RATE: 16 BRPM | TEMPERATURE: 97.8 F | SYSTOLIC BLOOD PRESSURE: 118 MMHG | HEIGHT: 59 IN | OXYGEN SATURATION: 99 % | WEIGHT: 114 LBS | BODY MASS INDEX: 22.98 KG/M2

## 2024-03-01 DIAGNOSIS — I10 ESSENTIAL (PRIMARY) HYPERTENSION: ICD-10-CM

## 2024-03-01 DIAGNOSIS — R01.1 CARDIAC MURMUR, UNSPECIFIED: ICD-10-CM

## 2024-03-01 DIAGNOSIS — E78.00 PURE HYPERCHOLESTEROLEMIA, UNSPECIFIED: ICD-10-CM

## 2024-03-01 DIAGNOSIS — I25.10 ATHEROSCLEROTIC HEART DISEASE OF NATIVE CORONARY ARTERY W/OUT ANGINA PECTORIS: ICD-10-CM

## 2024-03-01 PROCEDURE — 99214 OFFICE O/P EST MOD 30 MIN: CPT

## 2024-03-01 PROCEDURE — G2211 COMPLEX E/M VISIT ADD ON: CPT

## 2024-03-01 RX ORDER — LOSARTAN POTASSIUM 50 MG/1
50 TABLET, FILM COATED ORAL
Qty: 90 | Refills: 1 | Status: ACTIVE | COMMUNITY
Start: 2019-04-16 | End: 1900-01-01

## 2024-03-01 RX ORDER — ROSUVASTATIN CALCIUM 10 MG/1
10 TABLET, FILM COATED ORAL
Qty: 90 | Refills: 1 | Status: ACTIVE | COMMUNITY
Start: 2020-01-24 | End: 1900-01-01

## 2024-03-01 RX ORDER — GABAPENTIN 100 MG/1
100 CAPSULE ORAL 3 TIMES DAILY
Qty: 90 | Refills: 2 | Status: COMPLETED | COMMUNITY
Start: 2023-08-28 | End: 2024-03-01

## 2024-03-01 NOTE — PHYSICAL EXAM
[Well Developed] : well developed [Well Nourished] : well nourished [No Acute Distress] : no acute distress [Normal Conjunctiva] : normal conjunctiva [Normal Venous Pressure] : normal venous pressure [No Carotid Bruit] : no carotid bruit [Normal S1, S2] : normal S1, S2 [No Rub] : no rub [5th Left ICS - MCL] : palpated at the 5th LICS in the midclavicular line [Normal] : normal [No Precordial Heave] : no precordial heave was noted [Normal Rate] : normal [Rhythm Regular] : regular [Normal S1] : normal S1 [Normal S2] : normal S2 [No Gallop] : no gallop heard [II] : a grade 2 [No Pitting Edema] : no pitting edema present [2+] : left 2+ [Clear Lung Fields] : clear lung fields [No Abnormalities] : the abdominal aorta was not enlarged and no bruit was heard [No Respiratory Distress] : no respiratory distress  [Good Air Entry] : good air entry [Non Tender] : non-tender [Soft] : abdomen soft [No Masses/organomegaly] : no masses/organomegaly [Normal Bowel Sounds] : normal bowel sounds [Normal Gait] : normal gait [No Edema] : no edema [No Cyanosis] : no cyanosis [No Clubbing] : no clubbing [No Varicosities] : no varicosities [No Rash] : no rash [No Skin Lesions] : no skin lesions [Moves all extremities] : moves all extremities [No Focal Deficits] : no focal deficits [Normal Speech] : normal speech [Normal memory] : normal memory [Alert and Oriented] : alert and oriented [S4] : no S4 [S3] : no S3 [Right Carotid Bruit] : no bruit heard over the right carotid [Left Carotid Bruit] : no bruit heard over the left carotid [Right Femoral Bruit] : no bruit heard over the right femoral artery [Left Femoral Bruit] : no bruit heard over the left femoral artery

## 2024-03-01 NOTE — REASON FOR VISIT
[CV Risk Factors and Non-Cardiac Disease] : CV risk factors and non-cardiac disease [Hypertension] : hypertension [FreeTextEntry1] : This is an 88-year-old female with past medical history significant for hypertension, hyperlipidemia, asthma, murmur, status pos appendectomy, status post right inguinal hernia repair, status post right wrist surgery, right cataract surgery, hysterectomy, who comes in for cardiac follow-up.  11/3: Medication reconciliation: patient education given regarding carvedilol regimen; patient was taking it 1x/day in the morning but was re-educated that her prescription was to be taken 2x/day. She had elevated BP on exam, so will follow BP next month to see if it becomes controlled on correct dose.  Patient has active lifestyle with walking to the bank, grocery store, etc. weekly, but does not leave her house every day, and estimates she walks about 1 hour per week. Education was given regarding consciously increasing daily exercise amount to avoid deconditioning, as patient noted that she gets leg cramps/weakness as she walks.  [FreeTextEntry3] : Dr. Aguillon

## 2024-03-01 NOTE — DISCUSSION/SUMMARY
[Hypertension] : hypertension [Stable] : stable [Responding to Treatment] : responding to treatment [None] : There are no changes in medication management [de-identified] : Medication reconciliation: education given regarding medication regimen, Ms. Camarena was taking her carvedilol 1x/day, but was prescribed for 2x/day [FreeTextEntry1] : Dr. Gastelum-(PRIOR VISIT and PMH WITH Dr. Gastelum): This is an 88-year-old female with past medical history significant for hypertension, hyperlipidemia, asthma, murmur, status pos appendectomy, status post right inguinal hernia repair, status post right wrist surgery, right cataract surgery, hysterectomy, who comes in for cardiac follow-up evaluation. She denies chest pain, shortness of breath, dizziness or syncope.  She was born in [facility] and has no history of rheumatic fever.  She does not drink excessive caffeine or alcohol. Cardiac risk factors include hypertension. Electrocardiogram done November 3, 2023 demonstrated normal sinus rhythm rate 78 bpm is otherwise unremarkable. The patient's blood pressure is elevated today I have asked her to increase her carvedilol to 3.125 mg twice per day.  She will continue on her current dose of losartan 50 mg/day. Blood work done June 16, 2023 demonstrated potassium of 4.9, cholesterol 154, HDL 55, triglycerides 117, LDL calculated 76, non-HDL cholesterol 100 mg/dL and direct LDL of 81 mg/dL with a hemoglobin A1c of 5.7. She will have new blood work done for electrolytes and lipid panel prior to the next visit. I have asked her to return in 4 to 6 weeks to recheck her blood pressure. She is instructed follow-up with her primary care physician. Echo Doppler examination done June 2, 2021 demonstrated normal left ventricular ejection fraction of 63%, trace aortic valve regurgitation, mild mitral valve regurgitation, mild tricuspid valve regurgitation, trace pulmonic valve regurgitation. The patient will remain on her current dose of Coreg 3.125 twice per day.  Her blood pressure seems to be under adequate control. The patient had a normal pharmacologic nuclear stress test June 9, 2021. The patient understands that aerobic exercises must be increased to 40 minutes 4 times per week. A detailed discussion of lifestyle modification was done today. The patient has a good understanding of the diagnosis, and treatment plan. Lifestyle modification was also outlined.

## 2024-03-01 NOTE — ASSESSMENT
[FreeTextEntry1] : This is an 88-year-old female with past medical history significant for hypertension, hyperlipidemia, asthma, murmur, status pos appendectomy, status post right inguinal hernia repair, status post right wrist surgery, right cataract surgery, hysterectomy, who comes in for cardiac follow-up evaluation.  She was born in [facility] and has no history of rheumatic fever. She does not drink excessive caffeine or alcohol.  Cardiac risk factors include hypertension.  HPI: She is feeling generally well today and denies chest pain, dizziness, heart palpitations, recent episodes of syncope or falls, SOB, or dyspnea at this time.  Current Medications: Rosuvastatin 10 mg daily, Carvedilol to 3.125 mg BID, and Losartan 50 mg daily  BLOOD PRESSURE: Better controlled in today's visit.   BLOOD WORK: -New blood work was done 03/01/2024 to evaluate lipid profile, CBC, BMP, hepatic function, A1C and TSH. -Blood work done June 16, 2023 demonstrated potassium of 4.9, cholesterol 154, HDL 55, triglycerides 117, LDL calculated 76, non-HDL cholesterol 100 mg/dL and direct LDL of 81 mg/dL with a hemoglobin A1c of 5.7.   TESTING/REPORTS: -Electrocardiogram done November 3, 2023 demonstrated normal sinus rhythm rate 78 bpm is otherwise unremarkable.  -EKG done June 2023 demonstrated normal sinus rhythm rate 78 BPM otherwise unremarkable.   -Echo Doppler examination done June 2, 2021 demonstrated normal left ventricular ejection fraction of 63%, trace aortic valve regurgitation, mild mitral valve regurgitation, mild tricuspid valve regurgitation, trace pulmonic valve regurgitation.  -The patient had a normal pharmacologic nuclear stress test June 9, 2021.   PLAN: -She will continue her current medications and contact the office if problems arise before next follow-up appointment. -Patient will schedule echocardiogram doppler examination to evaluate murmur, left ventricular function, chamber size, and rule out hypertrophy.  I have discussed the plan of care with SAJANABRAHAM APARICIO and she will follow up in 3-4 months. They are compliant with all of their medications.   The patient understands that aerobic exercises must be increased to 40 minutes 4 times/week and a detailed discussion of lifestyle modification was done today.   The patient has a good understanding of the diagnosis, treatment plan and lifestyle modification.   They will contact me at the office for any questions with their care or any changes in their health status.   The patient was discussed with supervision physician Dr. Severo Gastelum at the time of the visit and the plan of care will be carried out as noted above.     ROYAL Barnes NP

## 2024-03-06 ENCOUNTER — APPOINTMENT (OUTPATIENT)
Dept: OPHTHALMOLOGY | Facility: CLINIC | Age: 89
End: 2024-03-06
Payer: MEDICARE

## 2024-03-06 ENCOUNTER — NON-APPOINTMENT (OUTPATIENT)
Age: 89
End: 2024-03-06

## 2024-03-06 PROCEDURE — 92014 COMPRE OPH EXAM EST PT 1/>: CPT

## 2024-03-06 PROCEDURE — 92134 CPTRZ OPH DX IMG PST SGM RTA: CPT

## 2024-04-03 ENCOUNTER — NON-APPOINTMENT (OUTPATIENT)
Age: 89
End: 2024-04-03

## 2024-04-11 ENCOUNTER — NON-APPOINTMENT (OUTPATIENT)
Age: 89
End: 2024-04-11

## 2024-04-12 ENCOUNTER — OUTPATIENT (OUTPATIENT)
Dept: OUTPATIENT SERVICES | Facility: HOSPITAL | Age: 89
LOS: 1 days | End: 2024-04-12
Payer: MEDICARE

## 2024-04-12 ENCOUNTER — APPOINTMENT (OUTPATIENT)
Dept: RADIOLOGY | Facility: CLINIC | Age: 89
End: 2024-04-12
Payer: MEDICARE

## 2024-04-12 DIAGNOSIS — J06.9 ACUTE UPPER RESPIRATORY INFECTION, UNSPECIFIED: ICD-10-CM

## 2024-04-12 DIAGNOSIS — T14.8XXA OTHER INJURY OF UNSPECIFIED BODY REGION, INITIAL ENCOUNTER: Chronic | ICD-10-CM

## 2024-04-12 DIAGNOSIS — Z98.41 CATARACT EXTRACTION STATUS, RIGHT EYE: Chronic | ICD-10-CM

## 2024-04-12 DIAGNOSIS — Z98.890 OTHER SPECIFIED POSTPROCEDURAL STATES: Chronic | ICD-10-CM

## 2024-04-12 DIAGNOSIS — Z90.710 ACQUIRED ABSENCE OF BOTH CERVIX AND UTERUS: Chronic | ICD-10-CM

## 2024-04-12 PROCEDURE — 71046 X-RAY EXAM CHEST 2 VIEWS: CPT

## 2024-04-12 PROCEDURE — 71046 X-RAY EXAM CHEST 2 VIEWS: CPT | Mod: 26

## 2024-05-22 ENCOUNTER — APPOINTMENT (OUTPATIENT)
Dept: ENDOCRINOLOGY | Facility: CLINIC | Age: 89
End: 2024-05-22
Payer: MEDICARE

## 2024-05-22 DIAGNOSIS — M81.0 AGE-RELATED OSTEOPOROSIS W/OUT CURRENT PATHOLOGICAL FRACTURE: ICD-10-CM

## 2024-05-22 PROCEDURE — 96372 THER/PROPH/DIAG INJ SC/IM: CPT

## 2024-05-22 RX ORDER — DENOSUMAB 60 MG/ML
60 INJECTION SUBCUTANEOUS
Qty: 1 | Refills: 0 | Status: COMPLETED | OUTPATIENT
Start: 2024-05-21

## 2024-07-09 ENCOUNTER — APPOINTMENT (OUTPATIENT)
Dept: PULMONOLOGY | Facility: CLINIC | Age: 89
End: 2024-07-09
Payer: MEDICARE

## 2024-07-09 VITALS
OXYGEN SATURATION: 94 % | HEART RATE: 87 BPM | HEIGHT: 59 IN | TEMPERATURE: 97.7 F | WEIGHT: 117.9 LBS | DIASTOLIC BLOOD PRESSURE: 64 MMHG | BODY MASS INDEX: 23.77 KG/M2 | RESPIRATION RATE: 16 BRPM | SYSTOLIC BLOOD PRESSURE: 166 MMHG

## 2024-07-09 DIAGNOSIS — J45.40 MODERATE PERSISTENT ASTHMA, UNCOMPLICATED: ICD-10-CM

## 2024-07-09 DIAGNOSIS — L50.1 IDIOPATHIC URTICARIA: ICD-10-CM

## 2024-07-09 DIAGNOSIS — J30.9 ALLERGIC RHINITIS, UNSPECIFIED: ICD-10-CM

## 2024-07-09 DIAGNOSIS — J45.909 UNSPECIFIED ASTHMA, UNCOMPLICATED: ICD-10-CM

## 2024-07-09 DIAGNOSIS — K21.9 GASTRO-ESOPHAGEAL REFLUX DISEASE W/OUT ESOPHAGITIS: ICD-10-CM

## 2024-07-09 DIAGNOSIS — R06.02 SHORTNESS OF BREATH: ICD-10-CM

## 2024-07-09 PROCEDURE — 94010 BREATHING CAPACITY TEST: CPT

## 2024-07-09 PROCEDURE — 99214 OFFICE O/P EST MOD 30 MIN: CPT | Mod: 25

## 2024-07-09 PROCEDURE — 95012 NITRIC OXIDE EXP GAS DETER: CPT

## 2024-07-09 PROCEDURE — 94727 GAS DIL/WSHOT DETER LNG VOL: CPT

## 2024-07-09 PROCEDURE — 94729 DIFFUSING CAPACITY: CPT

## 2024-07-09 RX ORDER — PREDNISONE 10 MG/1
10 TABLET ORAL
Qty: 50 | Refills: 0 | Status: ACTIVE | COMMUNITY
Start: 2024-07-09 | End: 1900-01-01

## 2024-07-09 RX ORDER — BENZONATATE 200 MG/1
200 CAPSULE ORAL 3 TIMES DAILY
Qty: 90 | Refills: 1 | Status: ACTIVE | COMMUNITY
Start: 2024-07-09 | End: 1900-01-01

## 2024-08-04 ENCOUNTER — NON-APPOINTMENT (OUTPATIENT)
Age: 89
End: 2024-08-04

## 2024-08-09 ENCOUNTER — APPOINTMENT (OUTPATIENT)
Dept: CARDIOLOGY | Facility: CLINIC | Age: 89
End: 2024-08-09

## 2024-08-09 ENCOUNTER — NON-APPOINTMENT (OUTPATIENT)
Age: 89
End: 2024-08-09

## 2024-08-09 PROBLEM — Z86.79 HISTORY OF CORONARY ARTERY DISEASE: Status: RESOLVED | Noted: 2020-02-05 | Resolved: 2024-08-09

## 2024-08-09 PROCEDURE — G2211 COMPLEX E/M VISIT ADD ON: CPT

## 2024-08-09 PROCEDURE — 93306 TTE W/DOPPLER COMPLETE: CPT

## 2024-08-09 PROCEDURE — 99214 OFFICE O/P EST MOD 30 MIN: CPT

## 2024-08-09 PROCEDURE — 93000 ELECTROCARDIOGRAM COMPLETE: CPT | Mod: 59

## 2024-08-09 PROCEDURE — G0444 DEPRESSION SCREEN ANNUAL: CPT

## 2024-08-09 NOTE — ASSESSMENT
[FreeTextEntry1] : This is an 89-year-old female with past medical history significant for hypertension, hyperlipidemia, asthma, murmur, status pos appendectomy, status post right inguinal hernia repair, status post right wrist surgery, right cataract surgery, hysterectomy, who comes in for cardiac follow-up evaluation.  She was born in [facility] and has no history of rheumatic fever. She does not drink excessive caffeine or alcohol.  Cardiac risk factors include hypertension.  HPI: She is feeling generally well today and denies chest pain, dizziness, heart palpitations, recent episodes of syncope or falls, SOB, or dyspnea at this time.  Current Medications: Rosuvastatin 10 mg daily, Carvedilol to 3.125 mg BID, and Losartan 50 mg daily    BLOOD PRESSURE: Elevated bilaterally.  *Will change Losartan to Telmisartan 40 mg daily for longer acting ARB control. Patient agreeable to plan.    BLOOD WORK: -BW done by her PCP 2-weeks ago will be faxed over.  -Lipid panel done March 2024 demonstrated triglyceride 170, cholesterol 169, HDL 55, LDL 85, non-, LDL direct 90. -Blood work done June 16, 2023 demonstrated potassium of 4.9, cholesterol 154, HDL 55, triglycerides 117, LDL calculated 76, non-HDL cholesterol 100 mg/dL and direct LDL of 81 mg/dL with a hemoglobin A1c of 5.7.   TESTING/REPORTS: -EKG done 08/09/2024 demonstrated regular sinus rhythm rate 74 bpm otherwise remarkable for nonspecific ST-T wave changes  -Electrocardiogram done November 3, 2023 demonstrated normal sinus rhythm rate 78 bpm is otherwise unremarkable.  -EKG done June 2023 demonstrated normal sinus rhythm rate 78 BPM otherwise unremarkable.   -Echo Doppler examination done June 2, 2021 demonstrated normal left ventricular ejection fraction of 63%, trace aortic valve regurgitation, mild mitral valve regurgitation, mild tricuspid valve regurgitation, trace pulmonic valve regurgitation.  -The patient had a normal pharmacologic nuclear stress test June 9, 2021.   PLAN: -Discontinue Losartan and begin Telmisartan 40 mg daily -She will continue her other current medications and contact the office if problems arise before next follow-up appointment.   I have discussed the plan of care with SAJAN APARICIO and she will follow up in 1 month. They are compliant with all of their medications.   The patient understands that aerobic exercises must be increased to 40 minutes 4 times/week and a detailed discussion of lifestyle modification was done today.   The patient has a good understanding of the diagnosis, treatment plan and lifestyle modification.   They will contact me at the office for any questions with their care or any changes in their health status.   The patient was discussed with supervision physician Dr. Severo Gastelum at the time of the visit and the plan of care will be carried out as noted above.     ROYAL Barnes NP

## 2024-08-09 NOTE — REASON FOR VISIT
[CV Risk Factors and Non-Cardiac Disease] : CV risk factors and non-cardiac disease [Hypertension] : hypertension [FreeTextEntry3] : Dr. Aguillon [FreeTextEntry1] : This is an 88-year-old female with past medical history significant for hypertension, hyperlipidemia, asthma, murmur, status pos appendectomy, status post right inguinal hernia repair, status post right wrist surgery, right cataract surgery, hysterectomy, who comes in for cardiac follow-up.  11/3: Medication reconciliation: patient education given regarding carvedilol regimen; patient was taking it 1x/day in the morning but was re-educated that her prescription was to be taken 2x/day. She had elevated BP on exam, so will follow BP next month to see if it becomes controlled on correct dose.  Patient has active lifestyle with walking to the bank, grocery store, etc. weekly, but does not leave her house every day, and estimates she walks about 1 hour per week. Education was given regarding consciously increasing daily exercise amount to avoid deconditioning, as patient noted that she gets leg cramps/weakness as she walks.

## 2024-08-09 NOTE — DISCUSSION/SUMMARY
[Hypertension] : hypertension [Stable] : stable [Responding to Treatment] : responding to treatment [None] : There are no changes in medication management [de-identified] : Medication reconciliation: education given regarding medication regimen, Ms. Camarena was taking her carvedilol 1x/day, but was prescribed for 2x/day [FreeTextEntry1] : Dr. Gastelum-(PRIOR VISIT and PMH WITH Dr. Gastelum): This is an 88-year-old female with past medical history significant for hypertension, hyperlipidemia, asthma, murmur, status pos appendectomy, status post right inguinal hernia repair, status post right wrist surgery, right cataract surgery, hysterectomy, who comes in for cardiac follow-up evaluation. She denies chest pain, shortness of breath, dizziness or syncope.  She was born in [facility] and has no history of rheumatic fever.  She does not drink excessive caffeine or alcohol. Cardiac risk factors include hypertension. Electrocardiogram done November 3, 2023 demonstrated normal sinus rhythm rate 78 bpm is otherwise unremarkable. The patient's blood pressure is elevated today I have asked her to increase her carvedilol to 3.125 mg twice per day.  She will continue on her current dose of losartan 50 mg/day. Blood work done June 16, 2023 demonstrated potassium of 4.9, cholesterol 154, HDL 55, triglycerides 117, LDL calculated 76, non-HDL cholesterol 100 mg/dL and direct LDL of 81 mg/dL with a hemoglobin A1c of 5.7. She will have new blood work done for electrolytes and lipid panel prior to the next visit. I have asked her to return in 4 to 6 weeks to recheck her blood pressure. She is instructed follow-up with her primary care physician. Echo Doppler examination done June 2, 2021 demonstrated normal left ventricular ejection fraction of 63%, trace aortic valve regurgitation, mild mitral valve regurgitation, mild tricuspid valve regurgitation, trace pulmonic valve regurgitation. The patient will remain on her current dose of Coreg 3.125 twice per day.  Her blood pressure seems to be under adequate control. The patient had a normal pharmacologic nuclear stress test June 9, 2021. The patient understands that aerobic exercises must be increased to 40 minutes 4 times per week. A detailed discussion of lifestyle modification was done today. The patient has a good understanding of the diagnosis, and treatment plan. Lifestyle modification was also outlined.

## 2024-09-09 ENCOUNTER — NON-APPOINTMENT (OUTPATIENT)
Age: 89
End: 2024-09-09

## 2024-09-13 NOTE — ED ADULT NURSE NOTE - NS TRANSFER PATIENT BELONGINGS
Physical Medicine and Rehabilitation MSK Consult  09/13/24       Chief Complaint:  Low back pain     HPI:  Idalia Ibarra is a  44 y.o. F who presents to the clinic today  for evaluation of low back pain.  Onset: years ago but worse in last two months  Location: across upper back and lower back  Radiation: none  Quality: sharp and achy  Duration: constant  Aggravating factors:  sitting and bending  Alleviating factors: ibuprofen wo relief 800 mg,isaak aspirin w mild relief    Severity: 8  Numbness/Tingling: No  Bowel or bladder incontinence: No  Pt's current medication regimen includes:      Treatment to date:  Physical therapy: No  Tried exercises on her own w mid relief  Medications taken to date for this complaint include the following: as above    Prior injections: no           Imaging  Reviewed 09/13/24     CT L and C spine 2021  ORDERING CLINICIAN:  SHARRON HUIZAR     TECHNIQUE:  CT scan of the head was performed from the skull base to the vertex,  without intravenous contrast.     Axial images of the cervical, thoracic and lumbar spine were  performed using thin axial sections, along with sagittal and coronal  reformats.     Axial images of the chest, abdomen and pelvis were obtained. Coronal  and sagittal reformats were obtained.     95 mL of  Omnipaque 350 was given intravenously.     FINDINGS:  HEAD:  CSF spaces: Unremarkable.     Parenchyma: Unremarkable.     Calvarium: Unremarkable.     Paranasal sinuses: Unremarkable.     Mastoids: Unremarkable.     Orbits: Unremarkable.        CERVICAL SPINE:  Alignment: Unremarkable.     Bones: Unremarkable.     Disc spaces: Unremarkable.     Other: Unremarkable.        CHEST  Base of the neck:  Unremarkable.     Lungs:  Minimal dependent atelectasis.     Mediastinum, hilum and lymph nodes:  Unremarkable.     Thoracic vasculature:  Unremarkable.     Chest wall, musculoskeletal and axilla:  Unremarkable.        ABDOMEN/PELVIS  Liver:  Unremarkable.     Gallbladder and  biliary system:  Unremarkable.     Pancreas:  Unremarkable.     Spleen:  Unremarkable.     Adrenal glands:  Unremarkable.     Kidneys/collecting systems/urinary bladder:  Unremarkable.     Pelvic structures:  Fibroid uterus.     Peritoneum/retroperitoneum/omentum:  Unremarkable.     Lymph nodes:  Unremarkable.     GI tract:  Unremarkable.     Abdominal wall:  Unremarkable.     Vascular:  Unremarkable.     Musculoskeletal:  Unremarkable.        THORACIC:  Alignment: Unremarkable.     Bones: Unremarkable.     Disc spaces: Unremarkable.        LUMBAR:  Alignment: Unremarkable.     Bones: Unremarkable.     Disc spaces: Unremarkable.     IMPRESSION:  1. No acute finding.  2. Fibroid uterus. Nonemergent pelvic ultrasound recommended.  Past Medical History:   Diagnosis Date    Acid reflux     Anemia     CBC from 10/18/2023 hemoglobin 8.2; hematocrit 29.5    History of seizures     Last seizure was 2 years ago in 2021.  Patient states they are stress-induced, and since that time she is employed lifestyle changes to reduce her stress load.  Patient was prescribed Dilantin at that time which she took for 2 months and discontinued on her own.  Patient states in  she was having weekly seizures.        Past Surgical History:   Procedure Laterality Date     SECTION, LOW TRANSVERSE       SECTION, LOW TRANSVERSE  2003     SECTION, LOW TRANSVERSE  2004    HYSTEROSCOPY      with polypectomy/myomectomy        Patient Active Problem List    Diagnosis Date Noted    History of general anesthesia 2023    Submucous uterine fibroid 2023    Iron deficiency anemia due to chronic blood loss 2023    Menorrhagia with irregular cycle 2023        Family History   Problem Relation Name Age of Onset    Diabetes Neg Hx      Hypertension Neg Hx      Heart disease Neg Hx      Anemia Neg Hx      Cancer Neg Hx          Current Outpatient Medications   Medication Sig Dispense Refill     acetaminophen (Tylenol) 325 mg tablet Take 2 tablets (650 mg) by mouth every 6 hours if needed.      ascorbic acid (Vitamin C) 500 mg tablet Take 1 tablet (500 mg) by mouth once daily. 30 tablet 11    Daily-Teodoro, with folic acid, 400 mcg tablet Take 1 tablet by mouth once daily.      docusate sodium (Colace) 100 mg capsule Take 1 capsule (100 mg) by mouth 2 times a day as needed for constipation. 60 capsule 0    ferrous sulfate 325 (65 Fe) MG tablet Take 1 tablet by mouth 3 times a day with meals.      ibuprofen 800 mg tablet Take 1 tablet (800 mg) by mouth every 8 hours if needed.      norethindrone ac-eth estradioL (Loestrin 1/20, 21,) 1-20 mg-mcg tablet Take 1 tablet by mouth once daily. 28 tablet 12     No current facility-administered medications for this visit.        Allergies   Allergen Reactions    Shellfish Containing Products Anaphylaxis and Swelling    Lobster Swelling    Pollen Extracts Itching        Social History     Socioeconomic History    Marital status: Single   Tobacco Use    Smoking status: Never    Smokeless tobacco: Never   Vaping Use    Vaping status: Never Used   Substance and Sexual Activity    Alcohol use: Not Currently    Drug use: Never    Sexual activity: Yes     Partners: Male     Birth control/protection: None     Social Determinants of Health     Financial Resource Strain: Medium Risk (9/26/2023)    Received from [facility]    Overall Financial Resource Strain (CARDIA)     Difficulty of Paying Living Expenses: Somewhat hard   Transportation Needs: No Transportation Needs (9/26/2023)    Received from [facility]    PRAPARE - Transportation     Lack of Transportation (Medical): No     Lack of Transportation (Non-Medical): No   Physical Activity: Not on File (9/5/2019)    [facility] from [facility]    Physical Activity     Physical Activity: 0   Stress: Not on File (9/5/2019)    [facility] from [facility]    Stress     Stress: 0   Social Connections: Not on  File (9/5/2019)    [facility] from [facility]    Social Connections     Social Connections and Isolation: 0   Intimate Partner Violence: Not At Risk (9/26/2023)    Received from [facility]    Humiliation, Afraid, Rape, and Kick questionnaire     Fear of Current or Ex-Partner: No     Emotionally Abused: No     Physically Abused: No     Sexually Abused: No   Housing Stability: Unknown (9/26/2023)    [facility] [employer] [employer] [facility] [employer] [facility]    Housing Stability Vital Sign     Unable to Pay for Housing in the Last Year: No     Unstable Housing in the Last Year: No   Lives w grand daughter  Works as a   No smoking alcohol or drug use     Review of Systems:  Constitutional:  Denies fever or chills, malaise, weight changes.   Eyes:  Denies change in visual acuity   HENT:  Denies nasal congestion or sore throat   Respiratory:  Denies cough or shortness of breath   Cardiovascular:  Denies chest pain or edema   GI:  Denies abdominal pain, nausea, vomiting, bloody stools or diarrhea   :  Denies dysuria   Integument:  Denies rash   Neurologic:  As per HPI  MSK: Per above HPI  Endocrine:  Denies polyuria or polydipsia   Lymphatic:  Denies swollen glands   Psychiatric:  Denies depression or anxiety            PHYSICAL EXAM:  /73 (BP Location: Left arm, Patient Position: Sitting)   Pulse 96   Resp 20   LMP  (LMP Unknown)       General:  NAD, well developed, f      Psychiatric: appropriate mood & affect.   Cardiovascular:  Normal pedal pulses; no LE edema.  Respiratory:  Normal rate; unlabored breathing.  Skin:  Intact; no erythema; no ecchymosis or rash.  Lymphatic:  No lymphadenopathy or lymphedema.  NEURO:  Alert and appropriate. Speech fluent, conversing appropriately.   Motor:    Rt: HF 5/5, KE 5/5, KF 5/5, DF 5/5, EHL 5/5, PF 5/5.    Lt: HF 5/5, KE 5/5, KF 5/5, DF 5/5, EHL 5/5, PF 5/5.  Sensation:     Light touch: intact in the b/l L3-S1 dermatomes.    PP: intact in the b/l L3-S1  dermatomes  Reflexes:     Right:  patellar 2+, achilles 2+,     Left: patellar 2+, achilles 2+,     Babinski's downgoing b/l; no clonus  Gait: Normal, narrow based gait.     MSK:  Inspection reveals no evidence of a pelvic obliqity   Spinal range of motion: Flexion to 40° degrees, Extension of 10 degrees.  There is tenderness over the lumbar and thoracic paraspinals.   Special tests:    Straight leg raise: -bl    Slump test: -bl    Facet loading: -bl          Impression: Idalia Ibarra is a 44 y.o. F w no significant pmh presenting with postural thoracic back pain and lower back pain likely due to posture,. Spondylosis.    Encounter Diagnoses   Name Primary?    Postural kyphosis of thoracic region Yes    Chronic midline low back pain without sciatica         Plan:  Orders Placed This Encounter   Procedures    XR lumbar spine complete 4+ views    XR thoracic spine 3 views    Referral to Physical Therapy    Referral to [facility]     Xr t and L spine  Pt for core rom hep  [facility] for chiropractic and massage  Mobic daily prn, discussed no otc nsaids      Fu 8 weeks    Thank you for the consultation.     Aysha Pearson MD  Physical Medicine and Rehabilitation    Clothing

## 2024-09-17 ENCOUNTER — LABORATORY RESULT (OUTPATIENT)
Age: 89
End: 2024-09-17

## 2024-09-17 ENCOUNTER — APPOINTMENT (OUTPATIENT)
Dept: CARDIOLOGY | Facility: CLINIC | Age: 89
End: 2024-09-17
Payer: MEDICARE

## 2024-09-17 VITALS
SYSTOLIC BLOOD PRESSURE: 145 MMHG | HEART RATE: 81 BPM | HEIGHT: 59 IN | RESPIRATION RATE: 16 BRPM | BODY MASS INDEX: 23.59 KG/M2 | WEIGHT: 117 LBS | TEMPERATURE: 98.4 F | DIASTOLIC BLOOD PRESSURE: 68 MMHG | OXYGEN SATURATION: 98 %

## 2024-09-17 DIAGNOSIS — I10 ESSENTIAL (PRIMARY) HYPERTENSION: ICD-10-CM

## 2024-09-17 DIAGNOSIS — E78.00 PURE HYPERCHOLESTEROLEMIA, UNSPECIFIED: ICD-10-CM

## 2024-09-17 DIAGNOSIS — Z86.79 PERSONAL HISTORY OF OTHER DISEASES OF THE CIRCULATORY SYSTEM: ICD-10-CM

## 2024-09-17 DIAGNOSIS — I07.1 RHEUMATIC TRICUSPID INSUFFICIENCY: ICD-10-CM

## 2024-09-17 DIAGNOSIS — I34.0 NONRHEUMATIC MITRAL (VALVE) INSUFFICIENCY: ICD-10-CM

## 2024-09-17 PROCEDURE — G2211 COMPLEX E/M VISIT ADD ON: CPT

## 2024-09-17 PROCEDURE — 99214 OFFICE O/P EST MOD 30 MIN: CPT

## 2024-09-17 RX ORDER — LOSARTAN POTASSIUM 50 MG/1
50 TABLET, FILM COATED ORAL
Qty: 180 | Refills: 1 | Status: ACTIVE | COMMUNITY
Start: 2024-09-17 | End: 1900-01-01

## 2024-09-17 NOTE — ASSESSMENT
[FreeTextEntry1] : This is an 89-year-old female with past medical history significant for hypertension, hyperlipidemia, asthma, murmur, status post appendectomy, status post right inguinal hernia repair, status post right wrist surgery, right cataract surgery, hysterectomy, who comes in for cardiac follow-up evaluation.  She was born in [facility] and has no history of rheumatic fever. She does not drink excessive caffeine or alcohol.  Cardiac risk factors include hypertension.  HPI: She is feeling generally well today and denies chest pain, dizziness, heart palpitations, recent episodes of syncope or falls, SOB, or dyspnea at this time. She is here for a BP check after starting Telmisartan 40 mg daily.   Current Medications: Rosuvastatin 10 mg daily, Carvedilol to 3.125 mg BID, and Telmisartan 40 mg daily  Patient reports not starting Telmisartan due to it being cost-prohibitive for her. She has remained on her Losartan 50 mg daily since last visit.    BLOOD PRESSURE: Elevated bilaterally.  *She would like to stay on Losartan due to her insurance copayment being low. I would recommend her increase to Losartan 100 mg daily at this time (2 50 mg tablets daily). She may need additional medication such as Amlodipine 5 mg daily in the evening if her pressure doesn't respond.    BLOOD WORK: -New blood work was done 09/17/2024 to evaluate lipid profile, CBC, BMP, hepatic function, A1C and TSH. -Lipid panel done March 2024 demonstrated triglyceride 170, cholesterol 169, HDL 55, LDL 85, non-, LDL direct 90. -Blood work done June 16, 2023 demonstrated potassium of 4.9, cholesterol 154, HDL 55, triglycerides 117, LDL calculated 76, non-HDL cholesterol 100 mg/dL and direct LDL of 81 mg/dL with a hemoglobin A1c of 5.7.   TESTING/REPORTS: -Echocardiogram done August 2024 demonstrated normal left ventricular systolic function EF 61%, moderate mitral regurgitation, mild tricuspid regurgitation, trace aortic regurgitation, trace pulmonic regurgitation.  -EKG done 08/09/2024 demonstrated regular sinus rhythm rate 74 bpm otherwise remarkable for nonspecific ST-T wave changes  -Electrocardiogram done November 3, 2023 demonstrated normal sinus rhythm rate 78 bpm is otherwise unremarkable.  -EKG done June 2023 demonstrated normal sinus rhythm rate 78 BPM otherwise unremarkable.   -Echo Doppler examination done June 2, 2021 demonstrated normal left ventricular ejection fraction of 63%, trace aortic valve regurgitation, mild mitral valve regurgitation, mild tricuspid valve regurgitation, trace pulmonic valve regurgitation.  -The patient had a normal pharmacologic nuclear stress test June 9, 2021.   PLAN: -She will increase her Losartan to 100 mg daily for better BP control.  -She will continue her other current medications and contact the office if problems arise before next follow-up appointment.   I have discussed the plan of care with SAJAN APARICIO and she will follow up in 3 months. They are compliant with all of their medications.   The patient understands that aerobic exercises must be increased to 40 minutes 4 times/week and a detailed discussion of lifestyle modification was done today.   The patient has a good understanding of the diagnosis, treatment plan and lifestyle modification.   They will contact me at the office for any questions with their care or any changes in their health status.   The patient was discussed with supervision physician Dr. Severo Gastelum at the time of the visit and the plan of care will be carried out as noted above.     ROYAL Barnes NP

## 2024-09-17 NOTE — DISCUSSION/SUMMARY
[Hypertension] : hypertension [Stable] : stable [Responding to Treatment] : responding to treatment [None] : There are no changes in medication management [de-identified] : Medication reconciliation: education given regarding medication regimen, Ms. Camarena was taking her carvedilol 1x/day, but was prescribed for 2x/day [FreeTextEntry1] : Dr. Gastelum-(PRIOR VISIT and PMH WITH Dr. Gastelum): This is an 88-year-old female with past medical history significant for hypertension, hyperlipidemia, asthma, murmur, status pos appendectomy, status post right inguinal hernia repair, status post right wrist surgery, right cataract surgery, hysterectomy, who comes in for cardiac follow-up evaluation. She denies chest pain, shortness of breath, dizziness or syncope.  She was born in [facility] and has no history of rheumatic fever.  She does not drink excessive caffeine or alcohol. Cardiac risk factors include hypertension. Electrocardiogram done November 3, 2023 demonstrated normal sinus rhythm rate 78 bpm is otherwise unremarkable. The patient's blood pressure is elevated today I have asked her to increase her carvedilol to 3.125 mg twice per day.  She will continue on her current dose of losartan 50 mg/day. Blood work done June 16, 2023 demonstrated potassium of 4.9, cholesterol 154, HDL 55, triglycerides 117, LDL calculated 76, non-HDL cholesterol 100 mg/dL and direct LDL of 81 mg/dL with a hemoglobin A1c of 5.7. She will have new blood work done for electrolytes and lipid panel prior to the next visit. I have asked her to return in 4 to 6 weeks to recheck her blood pressure. She is instructed follow-up with her primary care physician. Echo Doppler examination done June 2, 2021 demonstrated normal left ventricular ejection fraction of 63%, trace aortic valve regurgitation, mild mitral valve regurgitation, mild tricuspid valve regurgitation, trace pulmonic valve regurgitation. The patient will remain on her current dose of Coreg 3.125 twice per day.  Her blood pressure seems to be under adequate control. The patient had a normal pharmacologic nuclear stress test June 9, 2021. The patient understands that aerobic exercises must be increased to 40 minutes 4 times per week. A detailed discussion of lifestyle modification was done today. The patient has a good understanding of the diagnosis, and treatment plan. Lifestyle modification was also outlined.

## 2024-10-05 ENCOUNTER — NON-APPOINTMENT (OUTPATIENT)
Age: 89
End: 2024-10-05

## 2024-11-08 ENCOUNTER — RX RENEWAL (OUTPATIENT)
Age: 89
End: 2024-11-08

## 2024-11-11 ENCOUNTER — APPOINTMENT (OUTPATIENT)
Dept: PULMONOLOGY | Facility: CLINIC | Age: 89
End: 2024-11-11

## 2024-12-10 ENCOUNTER — APPOINTMENT (OUTPATIENT)
Dept: ENDOCRINOLOGY | Facility: CLINIC | Age: 88
End: 2024-12-10
Payer: MEDICARE

## 2024-12-10 VITALS
OXYGEN SATURATION: 98 % | WEIGHT: 117 LBS | HEART RATE: 68 BPM | SYSTOLIC BLOOD PRESSURE: 122 MMHG | DIASTOLIC BLOOD PRESSURE: 70 MMHG | HEIGHT: 59 IN | BODY MASS INDEX: 23.59 KG/M2

## 2024-12-10 DIAGNOSIS — E78.00 PURE HYPERCHOLESTEROLEMIA, UNSPECIFIED: ICD-10-CM

## 2024-12-10 DIAGNOSIS — I10 ESSENTIAL (PRIMARY) HYPERTENSION: ICD-10-CM

## 2024-12-10 DIAGNOSIS — M81.0 AGE-RELATED OSTEOPOROSIS W/OUT CURRENT PATHOLOGICAL FRACTURE: ICD-10-CM

## 2024-12-10 PROCEDURE — 99214 OFFICE O/P EST MOD 30 MIN: CPT | Mod: 25

## 2024-12-10 PROCEDURE — 77085 DXA BONE DENSITY AXL VRT FX: CPT | Mod: GA

## 2024-12-10 PROCEDURE — 96372 THER/PROPH/DIAG INJ SC/IM: CPT

## 2024-12-10 PROCEDURE — ZZZZZ: CPT

## 2024-12-10 RX ORDER — DENOSUMAB 60 MG/ML
60 INJECTION SUBCUTANEOUS
Qty: 1 | Refills: 0 | Status: COMPLETED | OUTPATIENT
Start: 2024-12-10

## 2024-12-10 RX ADMIN — DENOSUMAB 0 MG/ML: 60 INJECTION SUBCUTANEOUS at 00:00

## 2025-01-13 ENCOUNTER — RX RENEWAL (OUTPATIENT)
Age: 89
End: 2025-01-13

## 2025-01-29 ENCOUNTER — RX RENEWAL (OUTPATIENT)
Age: 89
End: 2025-01-29

## 2025-03-05 ENCOUNTER — APPOINTMENT (OUTPATIENT)
Dept: PULMONOLOGY | Facility: CLINIC | Age: 89
End: 2025-03-05
Payer: MEDICARE

## 2025-03-05 VITALS
HEART RATE: 80 BPM | SYSTOLIC BLOOD PRESSURE: 122 MMHG | BODY MASS INDEX: 24.56 KG/M2 | OXYGEN SATURATION: 96 % | RESPIRATION RATE: 16 BRPM | HEIGHT: 58 IN | DIASTOLIC BLOOD PRESSURE: 64 MMHG | WEIGHT: 117 LBS | TEMPERATURE: 96.7 F

## 2025-03-05 DIAGNOSIS — J30.9 ALLERGIC RHINITIS, UNSPECIFIED: ICD-10-CM

## 2025-03-05 DIAGNOSIS — Z72.820 SLEEP DEPRIVATION: ICD-10-CM

## 2025-03-05 DIAGNOSIS — R06.02 SHORTNESS OF BREATH: ICD-10-CM

## 2025-03-05 DIAGNOSIS — E55.9 VITAMIN D DEFICIENCY, UNSPECIFIED: ICD-10-CM

## 2025-03-05 DIAGNOSIS — L50.1 IDIOPATHIC URTICARIA: ICD-10-CM

## 2025-03-05 DIAGNOSIS — K21.9 GASTRO-ESOPHAGEAL REFLUX DISEASE W/OUT ESOPHAGITIS: ICD-10-CM

## 2025-03-05 DIAGNOSIS — J45.40 MODERATE PERSISTENT ASTHMA, UNCOMPLICATED: ICD-10-CM

## 2025-03-05 PROCEDURE — 99215 OFFICE O/P EST HI 40 MIN: CPT | Mod: 25

## 2025-03-05 PROCEDURE — 94010 BREATHING CAPACITY TEST: CPT

## 2025-03-05 PROCEDURE — 95012 NITRIC OXIDE EXP GAS DETER: CPT

## 2025-04-12 ENCOUNTER — NON-APPOINTMENT (OUTPATIENT)
Age: 89
End: 2025-04-12

## 2025-04-28 ENCOUNTER — APPOINTMENT (OUTPATIENT)
Dept: UROGYNECOLOGY | Facility: CLINIC | Age: 89
End: 2025-04-28
Payer: MEDICARE

## 2025-04-28 ENCOUNTER — LABORATORY RESULT (OUTPATIENT)
Age: 89
End: 2025-04-28

## 2025-04-28 ENCOUNTER — RESULT CHARGE (OUTPATIENT)
Age: 89
End: 2025-04-28

## 2025-04-28 DIAGNOSIS — N95.2 POSTMENOPAUSAL ATROPHIC VAGINITIS: ICD-10-CM

## 2025-04-28 DIAGNOSIS — R10.2 PELVIC AND PERINEAL PAIN: ICD-10-CM

## 2025-04-28 DIAGNOSIS — N30.00 ACUTE CYSTITIS W/OUT HEMATURIA: ICD-10-CM

## 2025-04-28 PROCEDURE — G2211 COMPLEX E/M VISIT ADD ON: CPT

## 2025-04-28 PROCEDURE — 99213 OFFICE O/P EST LOW 20 MIN: CPT

## 2025-04-28 PROCEDURE — 81003 URINALYSIS AUTO W/O SCOPE: CPT | Mod: QW

## 2025-04-29 ENCOUNTER — NON-APPOINTMENT (OUTPATIENT)
Age: 89
End: 2025-04-29

## 2025-04-29 LAB
BILIRUB UR QL STRIP: NORMAL
CANDIDA VAG CYTO: NOT DETECTED
CLARITY UR: CLEAR
G VAGINALIS+PREV SP MTYP VAG QL MICRO: NOT DETECTED
GLUCOSE UR-MCNC: NORMAL
HCG UR QL: 0.2 EU/DL
HGB UR QL STRIP.AUTO: NORMAL
KETONES UR-MCNC: NORMAL
LEUKOCYTE ESTERASE UR QL STRIP: NORMAL
NITRITE UR QL STRIP: NORMAL
PH UR STRIP: 5.5
PROT UR STRIP-MCNC: NORMAL
SP GR UR STRIP: 1.02
T VAGINALIS VAG QL WET PREP: NOT DETECTED

## 2025-04-30 DIAGNOSIS — R31.29 OTHER MICROSCOPIC HEMATURIA: ICD-10-CM

## 2025-04-30 LAB
APPEARANCE: ABNORMAL
BILIRUBIN URINE: NEGATIVE
BLOOD URINE: NEGATIVE
COLOR: NORMAL
GLUCOSE QUALITATIVE U: NEGATIVE MG/DL
KETONES URINE: ABNORMAL MG/DL
LEUKOCYTE ESTERASE URINE: ABNORMAL
NITRITE URINE: POSITIVE
PH URINE: 5.5
PROTEIN URINE: 30 MG/DL
SPECIFIC GRAVITY URINE: 1.02
UROBILINOGEN URINE: 0.2 MG/DL

## 2025-05-02 RX ORDER — NITROFURANTOIN (MONOHYDRATE/MACROCRYSTALS) 25; 75 MG/1; MG/1
100 CAPSULE ORAL
Qty: 10 | Refills: 0 | Status: ACTIVE | COMMUNITY
Start: 2025-05-02 | End: 1900-01-01

## 2025-05-28 ENCOUNTER — APPOINTMENT (OUTPATIENT)
Dept: CARDIOLOGY | Facility: CLINIC | Age: 89
End: 2025-05-28

## 2025-06-20 ENCOUNTER — APPOINTMENT (OUTPATIENT)
Dept: ENDOCRINOLOGY | Facility: CLINIC | Age: 89
End: 2025-06-20
Payer: MEDICARE

## 2025-06-20 ENCOUNTER — MED ADMIN CHARGE (OUTPATIENT)
Age: 89
End: 2025-06-20

## 2025-06-20 PROCEDURE — 96372 THER/PROPH/DIAG INJ SC/IM: CPT

## 2025-06-20 RX ORDER — DENOSUMAB 60 MG/ML
60 INJECTION SUBCUTANEOUS
Qty: 1 | Refills: 0 | Status: COMPLETED | OUTPATIENT
Start: 2025-06-19

## 2025-06-26 ENCOUNTER — APPOINTMENT (OUTPATIENT)
Dept: UROGYNECOLOGY | Facility: CLINIC | Age: 89
End: 2025-06-26
Payer: MEDICARE

## 2025-06-26 VITALS
HEIGHT: 57 IN | SYSTOLIC BLOOD PRESSURE: 120 MMHG | WEIGHT: 120 LBS | BODY MASS INDEX: 25.89 KG/M2 | DIASTOLIC BLOOD PRESSURE: 68 MMHG

## 2025-06-26 PROCEDURE — 99213 OFFICE O/P EST LOW 20 MIN: CPT

## 2025-06-30 ENCOUNTER — RX RENEWAL (OUTPATIENT)
Age: 89
End: 2025-06-30

## 2025-07-28 ENCOUNTER — OUTPATIENT (OUTPATIENT)
Dept: OUTPATIENT SERVICES | Facility: HOSPITAL | Age: 89
LOS: 1 days | End: 2025-07-28
Payer: MEDICARE

## 2025-07-28 ENCOUNTER — APPOINTMENT (OUTPATIENT)
Dept: ULTRASOUND IMAGING | Facility: CLINIC | Age: 89
End: 2025-07-28
Payer: MEDICARE

## 2025-07-28 DIAGNOSIS — Z90.49 ACQUIRED ABSENCE OF OTHER SPECIFIED PARTS OF DIGESTIVE TRACT: Chronic | ICD-10-CM

## 2025-07-28 DIAGNOSIS — Z98.41 CATARACT EXTRACTION STATUS, RIGHT EYE: Chronic | ICD-10-CM

## 2025-07-28 DIAGNOSIS — I82.403 ACUTE EMBOLISM AND THROMBOSIS OF UNSPECIFIED DEEP VEINS OF LOWER EXTREMITY, BILATERAL: ICD-10-CM

## 2025-07-28 DIAGNOSIS — T14.8XXA OTHER INJURY OF UNSPECIFIED BODY REGION, INITIAL ENCOUNTER: Chronic | ICD-10-CM

## 2025-07-28 DIAGNOSIS — Z98.890 OTHER SPECIFIED POSTPROCEDURAL STATES: Chronic | ICD-10-CM

## 2025-07-28 DIAGNOSIS — Z90.710 ACQUIRED ABSENCE OF BOTH CERVIX AND UTERUS: Chronic | ICD-10-CM

## 2025-07-28 PROCEDURE — 93970 EXTREMITY STUDY: CPT | Mod: 26

## 2025-07-28 PROCEDURE — 93970 EXTREMITY STUDY: CPT

## 2025-08-08 ENCOUNTER — APPOINTMENT (OUTPATIENT)
Dept: CARDIOLOGY | Facility: CLINIC | Age: 89
End: 2025-08-08

## 2025-08-13 ENCOUNTER — APPOINTMENT (OUTPATIENT)
Dept: UROGYNECOLOGY | Facility: CLINIC | Age: 89
End: 2025-08-13
Payer: MEDICARE

## 2025-08-13 DIAGNOSIS — N32.81 OVERACTIVE BLADDER: ICD-10-CM

## 2025-08-13 PROCEDURE — 64566 NEUROELTRD STIM POST TIBIAL: CPT

## 2025-08-20 ENCOUNTER — APPOINTMENT (OUTPATIENT)
Dept: UROGYNECOLOGY | Facility: CLINIC | Age: 89
End: 2025-08-20
Payer: MEDICARE

## 2025-08-20 DIAGNOSIS — N32.81 OVERACTIVE BLADDER: ICD-10-CM

## 2025-08-20 PROCEDURE — 64566 NEUROELTRD STIM POST TIBIAL: CPT

## 2025-09-02 ENCOUNTER — RX RENEWAL (OUTPATIENT)
Age: 89
End: 2025-09-02

## 2025-09-03 ENCOUNTER — APPOINTMENT (OUTPATIENT)
Dept: UROGYNECOLOGY | Facility: CLINIC | Age: 89
End: 2025-09-03

## 2025-09-15 ENCOUNTER — APPOINTMENT (OUTPATIENT)
Dept: UROGYNECOLOGY | Facility: CLINIC | Age: 89
End: 2025-09-15

## 2025-09-26 PROBLEM — J45.909 ACTIVE ASTHMA: Status: RESOLVED | Noted: 2023-08-28 | Resolved: 2025-09-26

## 2025-09-26 PROBLEM — R26.89 POOR BALANCE: Status: ACTIVE | Noted: 2025-09-26
